# Patient Record
Sex: MALE | Race: BLACK OR AFRICAN AMERICAN | Employment: FULL TIME | URBAN - METROPOLITAN AREA
[De-identification: names, ages, dates, MRNs, and addresses within clinical notes are randomized per-mention and may not be internally consistent; named-entity substitution may affect disease eponyms.]

---

## 2022-01-21 PROBLEM — E66.9 OBESITY (BMI 35.0-39.9 WITHOUT COMORBIDITY): Status: ACTIVE | Noted: 2022-01-21

## 2022-01-24 PROBLEM — E78.5 BORDERLINE HYPERLIPIDEMIA: Status: ACTIVE | Noted: 2022-01-24

## 2022-01-24 PROBLEM — R73.01 ELEVATED FASTING GLUCOSE: Status: ACTIVE | Noted: 2022-01-24

## 2022-03-18 PROBLEM — E66.9 OBESITY (BMI 35.0-39.9 WITHOUT COMORBIDITY): Status: ACTIVE | Noted: 2022-01-21

## 2022-03-19 PROBLEM — R73.01 ELEVATED FASTING GLUCOSE: Status: ACTIVE | Noted: 2022-01-24

## 2022-03-19 PROBLEM — E78.5 BORDERLINE HYPERLIPIDEMIA: Status: ACTIVE | Noted: 2022-01-24

## 2022-04-07 ENCOUNTER — HOSPITAL ENCOUNTER (INPATIENT)
Age: 57
LOS: 3 days | Discharge: HOME | DRG: 638 | End: 2022-04-10
Attending: STUDENT IN AN ORGANIZED HEALTH CARE EDUCATION/TRAINING PROGRAM | Admitting: FAMILY MEDICINE
Payer: COMMERCIAL

## 2022-04-07 DIAGNOSIS — E11.9 NEW ONSET TYPE 2 DIABETES MELLITUS (HCC): Primary | ICD-10-CM

## 2022-04-07 PROBLEM — N17.9 AKI (ACUTE KIDNEY INJURY) (HCC): Status: ACTIVE | Noted: 2022-04-07

## 2022-04-07 PROBLEM — R03.0 ELEVATED BLOOD PRESSURE READING: Status: ACTIVE | Noted: 2022-04-07

## 2022-04-07 LAB
ALBUMIN SERPL-MCNC: 4.1 G/DL (ref 3.5–5)
ALBUMIN/GLOB SERPL: 0.7 (ref 1.2–3.5)
ALP SERPL-CCNC: 99 U/L (ref 50–136)
ALT SERPL-CCNC: 25 U/L (ref 12–65)
ANION GAP SERPL CALC-SCNC: 16 MMOL/L (ref 7–16)
ARTERIAL PATENCY WRIST A: ABNORMAL
AST SERPL-CCNC: 21 U/L (ref 15–37)
BASE DEFICIT BLDV-SCNC: 9.6 MMOL/L
BASOPHILS # BLD: 0.1 K/UL (ref 0–0.2)
BASOPHILS NFR BLD: 0 % (ref 0–2)
BILIRUB SERPL-MCNC: 0.6 MG/DL (ref 0.2–1.1)
BUN SERPL-MCNC: 26 MG/DL (ref 6–23)
CALCIUM SERPL-MCNC: 9.7 MG/DL (ref 8.3–10.4)
CHLORIDE SERPL-SCNC: 102 MMOL/L (ref 98–107)
CO2 SERPL-SCNC: 18 MMOL/L (ref 21–32)
CREAT SERPL-MCNC: 1.7 MG/DL (ref 0.8–1.5)
DIFFERENTIAL METHOD BLD: ABNORMAL
EOSINOPHIL # BLD: 0 K/UL (ref 0–0.8)
EOSINOPHIL NFR BLD: 0 % (ref 0.5–7.8)
ERYTHROCYTE [DISTWIDTH] IN BLOOD BY AUTOMATED COUNT: 14.5 % (ref 11.9–14.6)
EST. AVERAGE GLUCOSE BLD GHB EST-MCNC: 252 MG/DL
GAS FLOW.O2 O2 DELIVERY SYS: ABNORMAL
GLOBULIN SER CALC-MCNC: 5.5 G/DL (ref 2.3–3.5)
GLUCOSE BLD STRIP.AUTO-MCNC: 289 MG/DL (ref 65–100)
GLUCOSE BLD STRIP.AUTO-MCNC: 292 MG/DL (ref 65–100)
GLUCOSE BLD STRIP.AUTO-MCNC: 300 MG/DL (ref 65–100)
GLUCOSE BLD STRIP.AUTO-MCNC: 325 MG/DL (ref 65–100)
GLUCOSE SERPL-MCNC: 375 MG/DL (ref 65–100)
HBA1C MFR BLD: 10.4 % (ref 4.2–6.3)
HCO3 BLDV-SCNC: 16.3 MMOL/L (ref 23–28)
HCT VFR BLD AUTO: 47.3 % (ref 41.1–50.3)
HGB BLD-MCNC: 15 G/DL (ref 13.6–17.2)
IMM GRANULOCYTES # BLD AUTO: 0 K/UL (ref 0–0.5)
IMM GRANULOCYTES NFR BLD AUTO: 0 % (ref 0–5)
LYMPHOCYTES # BLD: 4.1 K/UL (ref 0.5–4.6)
LYMPHOCYTES NFR BLD: 34 % (ref 13–44)
MCH RBC QN AUTO: 25.8 PG (ref 26.1–32.9)
MCHC RBC AUTO-ENTMCNC: 31.7 G/DL (ref 31.4–35)
MCV RBC AUTO: 81.4 FL (ref 79.6–97.8)
MONOCYTES # BLD: 0.7 K/UL (ref 0.1–1.3)
MONOCYTES NFR BLD: 5 % (ref 4–12)
NEUTS SEG # BLD: 7.2 K/UL (ref 1.7–8.2)
NEUTS SEG NFR BLD: 60 % (ref 43–78)
NRBC # BLD: 0 K/UL (ref 0–0.2)
O2/TOTAL GAS SETTING VFR VENT: 21 %
PCO2 BLDV: 35.3 MMHG (ref 41–51)
PH BLDV: 7.27 (ref 7.32–7.42)
PLATELET # BLD AUTO: 279 K/UL (ref 150–450)
PMV BLD AUTO: 11.8 FL (ref 9.4–12.3)
PO2 BLDV: 28 MMHG
POTASSIUM SERPL-SCNC: 5.1 MMOL/L (ref 3.5–5.1)
PROT SERPL-MCNC: 9.6 G/DL (ref 6.3–8.2)
RBC # BLD AUTO: 5.81 M/UL (ref 4.23–5.6)
SAO2 % BLDV: 44 % (ref 65–88)
SERVICE CMNT-IMP: ABNORMAL
SODIUM SERPL-SCNC: 136 MMOL/L (ref 136–145)
SPECIMEN TYPE: ABNORMAL
WBC # BLD AUTO: 12.1 K/UL (ref 4.3–11.1)

## 2022-04-07 PROCEDURE — 80053 COMPREHEN METABOLIC PANEL: CPT

## 2022-04-07 PROCEDURE — 99285 EMERGENCY DEPT VISIT HI MDM: CPT

## 2022-04-07 PROCEDURE — 74011636637 HC RX REV CODE- 636/637: Performed by: FAMILY MEDICINE

## 2022-04-07 PROCEDURE — 85025 COMPLETE CBC W/AUTO DIFF WBC: CPT

## 2022-04-07 PROCEDURE — 82803 BLOOD GASES ANY COMBINATION: CPT

## 2022-04-07 PROCEDURE — 82962 GLUCOSE BLOOD TEST: CPT

## 2022-04-07 PROCEDURE — 83036 HEMOGLOBIN GLYCOSYLATED A1C: CPT

## 2022-04-07 PROCEDURE — 74011000250 HC RX REV CODE- 250: Performed by: STUDENT IN AN ORGANIZED HEALTH CARE EDUCATION/TRAINING PROGRAM

## 2022-04-07 PROCEDURE — 74011250636 HC RX REV CODE- 250/636: Performed by: FAMILY MEDICINE

## 2022-04-07 PROCEDURE — 81003 URINALYSIS AUTO W/O SCOPE: CPT

## 2022-04-07 PROCEDURE — 74011250636 HC RX REV CODE- 250/636: Performed by: PHYSICIAN ASSISTANT

## 2022-04-07 PROCEDURE — 74011250636 HC RX REV CODE- 250/636: Performed by: EMERGENCY MEDICINE

## 2022-04-07 PROCEDURE — 65270000029 HC RM PRIVATE

## 2022-04-07 RX ORDER — ACETAMINOPHEN 325 MG/1
650 TABLET ORAL
Status: DISCONTINUED | OUTPATIENT
Start: 2022-04-07 | End: 2022-04-10 | Stop reason: HOSPADM

## 2022-04-07 RX ORDER — SODIUM CHLORIDE 0.9 % (FLUSH) 0.9 %
5-10 SYRINGE (ML) INJECTION AS NEEDED
Status: DISCONTINUED | OUTPATIENT
Start: 2022-04-07 | End: 2022-04-10 | Stop reason: HOSPADM

## 2022-04-07 RX ORDER — SODIUM CHLORIDE 0.9 % (FLUSH) 0.9 %
5-40 SYRINGE (ML) INJECTION EVERY 8 HOURS
Status: DISCONTINUED | OUTPATIENT
Start: 2022-04-07 | End: 2022-04-10 | Stop reason: HOSPADM

## 2022-04-07 RX ORDER — INSULIN GLARGINE 100 [IU]/ML
7 INJECTION, SOLUTION SUBCUTANEOUS
Status: DISCONTINUED | OUTPATIENT
Start: 2022-04-07 | End: 2022-04-08

## 2022-04-07 RX ORDER — POLYETHYLENE GLYCOL 3350 17 G/17G
17 POWDER, FOR SOLUTION ORAL DAILY PRN
Status: DISCONTINUED | OUTPATIENT
Start: 2022-04-07 | End: 2022-04-10 | Stop reason: HOSPADM

## 2022-04-07 RX ORDER — METFORMIN HYDROCHLORIDE 500 MG/1
500 TABLET ORAL
Status: DISCONTINUED | OUTPATIENT
Start: 2022-04-07 | End: 2022-04-07

## 2022-04-07 RX ORDER — ONDANSETRON 4 MG/1
4 TABLET, ORALLY DISINTEGRATING ORAL
Status: DISCONTINUED | OUTPATIENT
Start: 2022-04-07 | End: 2022-04-10 | Stop reason: HOSPADM

## 2022-04-07 RX ORDER — ONDANSETRON 2 MG/ML
4 INJECTION INTRAMUSCULAR; INTRAVENOUS
Status: DISCONTINUED | OUTPATIENT
Start: 2022-04-07 | End: 2022-04-10 | Stop reason: HOSPADM

## 2022-04-07 RX ORDER — ENOXAPARIN SODIUM 100 MG/ML
40 INJECTION SUBCUTANEOUS EVERY 24 HOURS
Status: DISCONTINUED | OUTPATIENT
Start: 2022-04-07 | End: 2022-04-10 | Stop reason: HOSPADM

## 2022-04-07 RX ORDER — ACETAMINOPHEN 650 MG/1
650 SUPPOSITORY RECTAL
Status: DISCONTINUED | OUTPATIENT
Start: 2022-04-07 | End: 2022-04-10 | Stop reason: HOSPADM

## 2022-04-07 RX ORDER — INSULIN LISPRO 100 [IU]/ML
INJECTION, SOLUTION INTRAVENOUS; SUBCUTANEOUS
Status: DISCONTINUED | OUTPATIENT
Start: 2022-04-07 | End: 2022-04-10 | Stop reason: HOSPADM

## 2022-04-07 RX ORDER — SODIUM CHLORIDE 0.9 % (FLUSH) 0.9 %
5-10 SYRINGE (ML) INJECTION EVERY 8 HOURS
Status: DISCONTINUED | OUTPATIENT
Start: 2022-04-07 | End: 2022-04-10 | Stop reason: HOSPADM

## 2022-04-07 RX ORDER — SODIUM CHLORIDE 0.9 % (FLUSH) 0.9 %
5-40 SYRINGE (ML) INJECTION AS NEEDED
Status: DISCONTINUED | OUTPATIENT
Start: 2022-04-07 | End: 2022-04-10 | Stop reason: HOSPADM

## 2022-04-07 RX ORDER — SODIUM CHLORIDE 9 MG/ML
100 INJECTION, SOLUTION INTRAVENOUS CONTINUOUS
Status: DISCONTINUED | OUTPATIENT
Start: 2022-04-07 | End: 2022-04-09

## 2022-04-07 RX ADMIN — INSULIN LISPRO 6 UNITS: 100 INJECTION, SOLUTION INTRAVENOUS; SUBCUTANEOUS at 22:40

## 2022-04-07 RX ADMIN — SODIUM CHLORIDE 1000 ML: 900 INJECTION, SOLUTION INTRAVENOUS at 15:50

## 2022-04-07 RX ADMIN — ENOXAPARIN SODIUM 40 MG: 40 INJECTION SUBCUTANEOUS at 22:39

## 2022-04-07 RX ADMIN — INSULIN GLARGINE 7 UNITS: 100 INJECTION, SOLUTION SUBCUTANEOUS at 22:40

## 2022-04-07 RX ADMIN — SODIUM CHLORIDE 1000 ML: 900 INJECTION, SOLUTION INTRAVENOUS at 16:59

## 2022-04-07 RX ADMIN — SODIUM CHLORIDE 100 ML/HR: 9 INJECTION, SOLUTION INTRAVENOUS at 22:42

## 2022-04-07 RX ADMIN — SODIUM CHLORIDE, PRESERVATIVE FREE 10 ML: 5 INJECTION INTRAVENOUS at 22:40

## 2022-04-07 RX ADMIN — INSULIN HUMAN 10 UNITS: 100 INJECTION, SOLUTION PARENTERAL at 18:43

## 2022-04-07 NOTE — H&P
Hospitalist Admission History and Physical     NAME:  Garrett Castro   Age:  56 y.o.  :   1965   MRN:   [id number]  PCP: JED Guzman  Consulting MD:  Treatment Team: Attending Provider: Rosmery Ventura DO; Primary Nurse: Elena Tian RN; Physician Assistant: GRACE Parker    No chief complaint on file. HPI:   Patient is a 56 y.o. male who presented to the ED for cc increased urination and fatigue for the past two weeks. Nothing seems to make better or worse. No past medical hx but does have a family hx of DM type II and HTN. Vitals - mild tachycardia, BP elevated    Labs- Glucose 426. WBC 12.1. Createine 1.7 from baseline 1.1. Family History   Problem Relation Age of Onset    Hypertension Mother     Diabetes Mother     Diabetes Father     Hypertension Father     Diabetes Maternal Grandmother     Hypertension Maternal Grandmother     Diabetes Maternal Grandfather     Hypertension Maternal Grandfather     Diabetes Paternal Grandmother     Hypertension Paternal Grandmother     Diabetes Paternal Grandfather     Hypertension Paternal Grandfather      Family history reviewed and negative except as noted above.     Social History     Social History Narrative    Not on file        Social History     Tobacco Use    Smoking status: Never Smoker    Smokeless tobacco: Never Used   Substance Use Topics    Alcohol use: Never        Social History     Substance and Sexual Activity   Drug Use Never         No Known Allergies    Prior to Admission medications    Not on File           Review of Systems    Constitutional:  NAD  Eyes:  no change in visual acuity, no photophobia  Ears, nose, mouth, throat, and face: no  Odynphagia, dysphagia, no thrush or exudate, negative for chronic sinus congestion, recurrent headaches  Respiratory: negative for SOB, hemoptysis or cough  Cardiovascular: negative for CP, palpitations, or PND  Gastrointestinal:inicreased thirst  Genitourinary: increased urinary frequency  Integument/breast: negative for skin rash or skin lesions  Hematologic/lymphatic: negative for known bleeding disorder  Musculoskeletal:negative for joint pain or joint tenderness  Neurological: fatigue  Behavioral/Psych: negative for depression or chronic anxiety,   Endocrine: negative for polydyspia, polyuria or intolerance to heat or cold  Allergic/Immunologic: negative for chronic allergic rhinitis, or known connective tissue disorder      Objective:     Patient Vitals for the past 24 hrs:   Temp Pulse Resp BP SpO2   04/07/22 1534 98.4 °F (36.9 °C) (!) 105 16 (!) 152/98 98 %        04/07 0701 - 04/07 1900  In: 2000 [I.V.:2000]  Out: -   No intake/output data recorded.     Data Review:   Recent Results (from the past 24 hour(s))   AMB POC URINALYSIS DIP STICK AUTO W/ MICRO    Collection Time: 04/07/22 11:31 AM   Result Value Ref Range    Color (UA POC) Yellow     Clarity (UA POC) Clear     Glucose (UA POC) 3+ Negative    Bilirubin (UA POC) 1+ Negative    Ketones (UA POC) 4+ Negative    Specific gravity (UA POC) 1.030 1.001 - 1.035    Blood (UA POC) Trace Negative    pH (UA POC) 5.5 4.6 - 8.0    Protein (UA POC) 1+ Negative    Urobilinogen (UA POC) normal 0.2 - 1    Nitrites (UA POC) Negative Negative    Leukocyte esterase (UA POC) Negative Negative   AMB POC GLUCOSE, SEMI QUANTITATIVE, BLOOD    Collection Time: 04/07/22 11:36 AM   Result Value Ref Range    Glucose  MG/DL   GLUCOSE, POC    Collection Time: 04/07/22  3:46 PM   Result Value Ref Range    Glucose (POC) 325 (H) 65 - 100 mg/dL    Performed by Kaba (Chamber)    CBC WITH AUTOMATED DIFF    Collection Time: 04/07/22  3:47 PM   Result Value Ref Range    WBC 12.1 (H) 4.3 - 11.1 K/uL    RBC 5.81 (H) 4.23 - 5.6 M/uL    HGB 15.0 13.6 - 17.2 g/dL    HCT 47.3 41.1 - 50.3 %    MCV 81.4 79.6 - 97.8 FL    MCH 25.8 (L) 26.1 - 32.9 PG    MCHC 31.7 31.4 - 35.0 g/dL    RDW 14.5 11.9 - 14.6 % PLATELET 509 908 - 669 K/uL    MPV 11.8 9.4 - 12.3 FL    ABSOLUTE NRBC 0.00 0.0 - 0.2 K/uL    DF AUTOMATED      NEUTROPHILS 60 43 - 78 %    LYMPHOCYTES 34 13 - 44 %    MONOCYTES 5 4.0 - 12.0 %    EOSINOPHILS 0 (L) 0.5 - 7.8 %    BASOPHILS 0 0.0 - 2.0 %    IMMATURE GRANULOCYTES 0 0.0 - 5.0 %    ABS. NEUTROPHILS 7.2 1.7 - 8.2 K/UL    ABS. LYMPHOCYTES 4.1 0.5 - 4.6 K/UL    ABS. MONOCYTES 0.7 0.1 - 1.3 K/UL    ABS. EOSINOPHILS 0.0 0.0 - 0.8 K/UL    ABS. BASOPHILS 0.1 0.0 - 0.2 K/UL    ABS. IMM. GRANS. 0.0 0.0 - 0.5 K/UL   METABOLIC PANEL, COMPREHENSIVE    Collection Time: 04/07/22  3:47 PM   Result Value Ref Range    Sodium 136 136 - 145 mmol/L    Potassium 5.1 3.5 - 5.1 mmol/L    Chloride 102 98 - 107 mmol/L    CO2 18 (L) 21 - 32 mmol/L    Anion gap 16 7 - 16 mmol/L    Glucose 375 (H) 65 - 100 mg/dL    BUN 26 (H) 6 - 23 MG/DL    Creatinine 1.70 (H) 0.8 - 1.5 MG/DL    GFR est AA 54 (L) >60 ml/min/1.73m2    GFR est non-AA 45 (L) >60 ml/min/1.73m2    Calcium 9.7 8.3 - 10.4 MG/DL    Bilirubin, total 0.6 0.2 - 1.1 MG/DL    ALT (SGPT) 25 12 - 65 U/L    AST (SGOT) 21 15 - 37 U/L    Alk.  phosphatase 99 50 - 136 U/L    Protein, total 9.6 (H) 6.3 - 8.2 g/dL    Albumin 4.1 3.5 - 5.0 g/dL    Globulin 5.5 (H) 2.3 - 3.5 g/dL    A-G Ratio 0.7 (L) 1.2 - 3.5     POC VENOUS BLOOD GAS    Collection Time: 04/07/22  5:35 PM   Result Value Ref Range    Device: ROOM AIR      FIO2 (POC) 21 %    pH, venous (POC) 7.27 (L) 7.32 - 7.42      pCO2, venous (POC) 35.3 (L) 41 - 51 MMHG    pO2, venous (POC) 28 (LL) mmHg    HCO3, venous (POC) 16.3 (L) 23 - 28 MMOL/L    sO2, venous (POC) 44.0 (L) 65 - 88 %    Base deficit, venous (POC) 9.6 mmol/L    Allens test (POC) NOT APPLICABLE      Specimen type (POC) VENOUS BLOOD      Performed by Krystle     Critical value read back DRSHEORN    GLUCOSE, POC    Collection Time: 04/07/22  6:27 PM   Result Value Ref Range    Glucose (POC) 300 (H) 65 - 100 mg/dL    Performed by Lore Physical Exam:     General:  Alert, cooperative, tired appearing. Eyes:  Conjunctivae/corneas clear. PERRL   Ears:  Normal TMs and external ear canals both ears. Nose: Wearing mask    Mouth/Throat: Wearing mask   Neck:  no JVD. Back:   deferred   Lungs:   Clear to auscultation bilaterally. Heart:  Regular rate and rhythm, S1, S2 normal   Abdomen:   Soft, non-tender. Bowel sounds normal. Umbilical hernia. Obese   Extremities: Extremities normal, atraumatic, no cyanosis or edema. Pulses: 2+ and symmetric all extremities. Skin: Skin color, texture, turgor normal. No rashes or lesions   Lymph nodes:  normal.   Neurologic: CNII-XII intact. Assessment and Plan     Principal Problem:    New onset type 2 diabetes mellitus ([facility] [address] (4/7/2022)    Active Problems:    Obesity (BMI 35.0-39.9 without comorbidity) (1/21/2022)      Elevated blood pressure reading (4/7/2022)      LINDA (acute kidney injury) ([facility] [address] (4/7/2022)    New diagnosis of DM type II - Start lantus 7 units qHS, SS. Give humulin R 10 units now. Glucose starting to trend down. Consult DM education. Check A1C. LINDA - IV fluids. Once kidney function normal, can consider adding metformin and ACE    Elevated BP reading without diagnosis of HTN - Recheck BP reading at home or at PCP office. May need to be on BP medications.      No indication of infection, leukocytosis likely from stress demargination    DVT prophylaxis - Lovenox    Signed By: Mateo Santana DO   April 7, 2022

## 2022-04-07 NOTE — ACP (ADVANCE CARE PLANNING)
[email or web address] Hospitalist Service  At the heart of better care     Advance Care Planning   Admit Date:  2022  4:21 PM   Name:  Devi Pardo   Age:  56 y.o. Sex:  male  :  1965   MRN:  [id number]   Room:  [place] [address]    Devi Pardo is able to make his own decisions: Yes    If pt unable to make decisions, POA/surrogate decision maker:  Wife    Other members present in the meeting:   Wife    Patient / surrogate decision-maker directed:  Code Status: FULL CODE -full aggressive medical and surgical interventions, including intubations, resuscitations, pressors, artificial tube feeding    Patient or surrogate consented to discussion of the current conditions, workup, management plans, prognosis, and understand the risk for further deterioration. Time spent: 17 minutes in direct discussion (face to face and/or over phone).     Signed:  Lena Olivas DO

## 2022-04-07 NOTE — H&P
Hospitalist Admission History and Physical     NAME:  Alexander Glasgow   Age:  56 y.o.  :   1965   MRN:   [id number]  PCP: JED Kay  Consulting MD:  Treatment Team: Attending Provider: Francisco Pardo DO; Primary Nurse: Yenni Morgan, RN; Physician Assistant: GRACE Saravia    No chief complaint on file. HPI:   Patient is a 56 y.o. male who presented to the ED for cc increased urination and fatigue for the past two weeks. Nothing seems to make better or worse. No past medical hx but does have a family hx of DM type II and HTN. Vitals - mild tachycardia, BP elevated    Labs- Glucose 426. WBC 12.1. Createine 1.7 from baseline 1.1. Family History   Problem Relation Age of Onset    Hypertension Mother     Diabetes Mother     Diabetes Father     Hypertension Father     Diabetes Maternal Grandmother     Hypertension Maternal Grandmother     Diabetes Maternal Grandfather     Hypertension Maternal Grandfather     Diabetes Paternal Grandmother     Hypertension Paternal Grandmother     Diabetes Paternal Grandfather     Hypertension Paternal Grandfather      Family history reviewed and negative except as noted above.     Social History     Social History Narrative    Not on file        Social History     Tobacco Use    Smoking status: Never Smoker    Smokeless tobacco: Never Used   Substance Use Topics    Alcohol use: Never        Social History     Substance and Sexual Activity   Drug Use Never         No Known Allergies    Prior to Admission medications    Not on File           Review of Systems    Constitutional:  NAD  Eyes:  no change in visual acuity, no photophobia  Ears, nose, mouth, throat, and face: no  Odynphagia, dysphagia, no thrush or exudate, negative for chronic sinus congestion, recurrent headaches  Respiratory: negative for SOB, hemoptysis or cough  Cardiovascular: negative for CP, palpitations, or PND  Gastrointestinal:inicreased thirst  Genitourinary: increased urinary frequency  Integument/breast: negative for skin rash or skin lesions  Hematologic/lymphatic: negative for known bleeding disorder  Musculoskeletal:negative for joint pain or joint tenderness  Neurological: fatigue  Behavioral/Psych: negative for depression or chronic anxiety,   Endocrine: negative for polydyspia, polyuria or intolerance to heat or cold  Allergic/Immunologic: negative for chronic allergic rhinitis, or known connective tissue disorder      Objective:     Patient Vitals for the past 24 hrs:   Temp Pulse Resp BP SpO2   04/07/22 1534 98.4 °F (36.9 °C) (!) 105 16 (!) 152/98 98 %        04/07 0701 - 04/07 1900  In: 2000 [I.V.:2000]  Out: -   No intake/output data recorded.     Data Review:   Recent Results (from the past 24 hour(s))   AMB POC URINALYSIS DIP STICK AUTO W/ MICRO    Collection Time: 04/07/22 11:31 AM   Result Value Ref Range    Color (UA POC) Yellow     Clarity (UA POC) Clear     Glucose (UA POC) 3+ Negative    Bilirubin (UA POC) 1+ Negative    Ketones (UA POC) 4+ Negative    Specific gravity (UA POC) 1.030 1.001 - 1.035    Blood (UA POC) Trace Negative    pH (UA POC) 5.5 4.6 - 8.0    Protein (UA POC) 1+ Negative    Urobilinogen (UA POC) normal 0.2 - 1    Nitrites (UA POC) Negative Negative    Leukocyte esterase (UA POC) Negative Negative   AMB POC GLUCOSE, SEMI QUANTITATIVE, BLOOD    Collection Time: 04/07/22 11:36 AM   Result Value Ref Range    Glucose  MG/DL   GLUCOSE, POC    Collection Time: 04/07/22  3:46 PM   Result Value Ref Range    Glucose (POC) 325 (H) 65 - 100 mg/dL    Performed by Kaba (Chamber)    CBC WITH AUTOMATED DIFF    Collection Time: 04/07/22  3:47 PM   Result Value Ref Range    WBC 12.1 (H) 4.3 - 11.1 K/uL    RBC 5.81 (H) 4.23 - 5.6 M/uL    HGB 15.0 13.6 - 17.2 g/dL    HCT 47.3 41.1 - 50.3 %    MCV 81.4 79.6 - 97.8 FL    MCH 25.8 (L) 26.1 - 32.9 PG    MCHC 31.7 31.4 - 35.0 g/dL    RDW 14.5 11.9 - 14.6 % PLATELET 075 722 - 938 K/uL    MPV 11.8 9.4 - 12.3 FL    ABSOLUTE NRBC 0.00 0.0 - 0.2 K/uL    DF AUTOMATED      NEUTROPHILS 60 43 - 78 %    LYMPHOCYTES 34 13 - 44 %    MONOCYTES 5 4.0 - 12.0 %    EOSINOPHILS 0 (L) 0.5 - 7.8 %    BASOPHILS 0 0.0 - 2.0 %    IMMATURE GRANULOCYTES 0 0.0 - 5.0 %    ABS. NEUTROPHILS 7.2 1.7 - 8.2 K/UL    ABS. LYMPHOCYTES 4.1 0.5 - 4.6 K/UL    ABS. MONOCYTES 0.7 0.1 - 1.3 K/UL    ABS. EOSINOPHILS 0.0 0.0 - 0.8 K/UL    ABS. BASOPHILS 0.1 0.0 - 0.2 K/UL    ABS. IMM. GRANS. 0.0 0.0 - 0.5 K/UL   METABOLIC PANEL, COMPREHENSIVE    Collection Time: 04/07/22  3:47 PM   Result Value Ref Range    Sodium 136 136 - 145 mmol/L    Potassium 5.1 3.5 - 5.1 mmol/L    Chloride 102 98 - 107 mmol/L    CO2 18 (L) 21 - 32 mmol/L    Anion gap 16 7 - 16 mmol/L    Glucose 375 (H) 65 - 100 mg/dL    BUN 26 (H) 6 - 23 MG/DL    Creatinine 1.70 (H) 0.8 - 1.5 MG/DL    GFR est AA 54 (L) >60 ml/min/1.73m2    GFR est non-AA 45 (L) >60 ml/min/1.73m2    Calcium 9.7 8.3 - 10.4 MG/DL    Bilirubin, total 0.6 0.2 - 1.1 MG/DL    ALT (SGPT) 25 12 - 65 U/L    AST (SGOT) 21 15 - 37 U/L    Alk.  phosphatase 99 50 - 136 U/L    Protein, total 9.6 (H) 6.3 - 8.2 g/dL    Albumin 4.1 3.5 - 5.0 g/dL    Globulin 5.5 (H) 2.3 - 3.5 g/dL    A-G Ratio 0.7 (L) 1.2 - 3.5     POC VENOUS BLOOD GAS    Collection Time: 04/07/22  5:35 PM   Result Value Ref Range    Device: ROOM AIR      FIO2 (POC) 21 %    pH, venous (POC) 7.27 (L) 7.32 - 7.42      pCO2, venous (POC) 35.3 (L) 41 - 51 MMHG    pO2, venous (POC) 28 (LL) mmHg    HCO3, venous (POC) 16.3 (L) 23 - 28 MMOL/L    sO2, venous (POC) 44.0 (L) 65 - 88 %    Base deficit, venous (POC) 9.6 mmol/L    Allens test (POC) NOT APPLICABLE      Specimen type (POC) VENOUS BLOOD      Performed by Krystle     Critical value read back DRSHEORN    GLUCOSE, POC    Collection Time: 04/07/22  6:27 PM   Result Value Ref Range    Glucose (POC) 300 (H) 65 - 100 mg/dL    Performed by Lore Physical Exam:     General:  Alert, cooperative, tired appearing. Eyes:  Conjunctivae/corneas clear. PERRL   Ears:  Normal TMs and external ear canals both ears. Nose: Wearing mask    Mouth/Throat: Wearing mask   Neck:  no JVD. Back:   deferred   Lungs:   Clear to auscultation bilaterally. Heart:  Regular rate and rhythm, S1, S2 normal   Abdomen:   Soft, non-tender. Bowel sounds normal. Umbilical hernia. Obese   Extremities: Extremities normal, atraumatic, no cyanosis or edema. Pulses: 2+ and symmetric all extremities. Skin: Skin color, texture, turgor normal. No rashes or lesions   Lymph nodes:  normal.   Neurologic: CNII-XII intact. Assessment and Plan     Principal Problem:    New onset type 2 diabetes mellitus ([facility] [address] (4/7/2022)    Active Problems:    Obesity (BMI 35.0-39.9 without comorbidity) (1/21/2022)      Elevated blood pressure reading (4/7/2022)      LINDA (acute kidney injury) ([facility] [address] (4/7/2022)    New diagnosis of DM type II - Start lantus 7 units qHS, SS. Give humulin R 10 units now. Glucose starting to trend down. Consult DM education. Check A1C. LINDA - IV fluids. Once kidney function normal, can consider adding metformin and ACE    Elevated BP reading without diagnosis of HTN - Recheck BP reading at home or at PCP office. May need to be on BP medications.      No indication of infection, leukocytosis likely from stress demargination    DVT prophylaxis - Lovenox    Signed By: Fortunato Bourne DO   April 7, 2022

## 2022-04-07 NOTE — ED PROVIDER NOTES
To ER with 2-week history of frequent urination and fatigue. Seen at primary care and sent here for evaluation for hyperglycemia patient has no history of diabetes patient complains of polyuria polydipsia    The history is provided by the patient. Fatigue  This is a new problem. Episode onset: 2 weeks. The problem occurs constantly. The problem has been gradually worsening. Pertinent negatives include no chest pain and no abdominal pain. Nothing aggravates the symptoms. Nothing relieves the symptoms. He has tried nothing for the symptoms. The treatment provided no relief. No past medical history on file. No past surgical history on file.       Family History:   Problem Relation Age of Onset    Hypertension Mother     Diabetes Mother     Diabetes Father     Hypertension Father     Diabetes Maternal Grandmother     Hypertension Maternal Grandmother     Diabetes Maternal Grandfather     Hypertension Maternal Grandfather     Diabetes Paternal Grandmother     Hypertension Paternal Grandmother     Diabetes Paternal Grandfather     Hypertension Paternal Grandfather        Social History     Socioeconomic History    Marital status:      Spouse name: Not on file    Number of children: Not on file    Years of education: Not on file    Highest education level: Not on file   Occupational History    Not on file   Tobacco Use    Smoking status: Never Smoker    Smokeless tobacco: Never Used   Vaping Use    Vaping Use: Never used   Substance and Sexual Activity    Alcohol use: Never    Drug use: Never    Sexual activity: Yes   Other Topics Concern    Not on file   Social History Narrative    Not on file     Social Determinants of Health     Financial Resource Strain:     Difficulty of Paying Living Expenses: Not on file   Food Insecurity:     Worried About [address] in the Last Year: Not on file    Sis of Food in the Last Year: Not on file   Transportation Needs:     Lack of Transportation (Medical): Not on file    Lack of Transportation (Non-Medical): Not on file   Physical Activity:     Days of Exercise per Week: Not on file    Minutes of Exercise per Session: Not on file   Stress:     Feeling of Stress : Not on file   Social Connections:     Frequency of Communication with Friends and Family: Not on file    Frequency of Social Gatherings with Friends and Family: Not on file    Attends Synagogue Services: Not on file    Active Member of [address] [place] [address] or Organizations: Not on file    Attends Club or Organization Meetings: Not on file    Marital Status: Not on file   Intimate Partner Violence:     Fear of Current or Ex-Partner: Not on file    Emotionally Abused: Not on file    Physically Abused: Not on file    Sexually Abused: Not on file   Housing Stability:     Unable to Pay for Housing in the Last Year: Not on file    Number of [place] in the Last Year: Not on file    Unstable Housing in the Last Year: Not on file         ALLERGIES: Patient has no known allergies. Review of Systems   Constitutional: Positive for fatigue. Cardiovascular: Negative for chest pain. Gastrointestinal: Negative for abdominal pain. All other systems reviewed and are negative. Vitals:    04/07/22 1534   BP: (!) 152/98   Pulse: (!) 105   Resp: 16   Temp: 98.4 °F (36.9 °C)   SpO2: 98%   Weight: 117.5 kg (259 lb)   Height: 6' 1\" (1.854 m)            Physical Exam  Vitals and nursing note reviewed. Constitutional:       General: He is not in acute distress. Appearance: Normal appearance. He is well-developed. He is obese. He is not ill-appearing or diaphoretic. HENT:      Head: Normocephalic and atraumatic. Right Ear: External ear normal.      Left Ear: External ear normal.      Nose: Nose normal.      Mouth/Throat:      Mouth: Mucous membranes are moist.   Eyes:      Pupils: Pupils are equal, round, and reactive to light.    Cardiovascular:      Rate and Rhythm: Regular rhythm. Tachycardia present. Pulmonary:      Effort: Pulmonary effort is normal.      Breath sounds: Normal breath sounds. Abdominal:      General: Bowel sounds are normal.      Palpations: Abdomen is soft. Musculoskeletal:         General: Normal range of motion. Cervical back: Normal range of motion and neck supple. Right lower leg: No edema. Left lower leg: No edema. Skin:     General: Skin is warm. Neurological:      General: No focal deficit present. Mental Status: He is alert and oriented to person, place, and time.    Psychiatric:         Mood and Affect: Mood normal.         Behavior: Behavior normal.          MDM  Number of Diagnoses or Management Options  New onset type 2 diabetes mellitus ([facility] [address]  Diagnosis management comments: Labs Reviewed  CBC WITH AUTOMATED DIFF - Abnormal; Notable for the following components:     WBC                           12.1 (*)               RBC                           5.81 (*)               MCH                           25.8 (*)               EOSINOPHILS                   0 (*)               All other components within normal limits  METABOLIC PANEL, COMPREHENSIVE - Abnormal; Notable for the following components:     CO2                           18 (*)                 Glucose                       375 (*)                BUN                           26 (*)                 Creatinine                    1.70 (*)               GFR est AA                    54 (*)                 GFR est non-AA                45 (*)                 Protein, total                9.6 (*)                Globulin                      5.5 (*)                A-G Ratio                     0.7 (*)             All other components within normal limits  GLUCOSE, POC - Abnormal; Notable for the following components:     Glucose (POC)                 325 (*)             All other components within normal limits  POC VENOUS BLOOD GAS - Abnormal; Notable for the following components:     pH, venous (POC)              7.27 (*)               pCO2, venous (POC)            35.3 (*)               pO2, venous (POC)             28 (*)                 HCO3, venous (POC)            16.3 (*)               sO2, venous (POC)             44.0 (*)            All other components within normal limits  VENOUS BLOOD GAS  POC URINE MACROSCOPIC    EKG interpretation normal sinus rhythm at 99 bpm no ectopy no signs of MI no previous for comparison    New onset diabetes, patient seen by Dr. Samantha Angel will admit   Patient given 2 L normal saline in the ER remained stable insulin drip ordered specialist consulted       Amount and/or Complexity of Data Reviewed  Clinical lab tests: ordered and reviewed  Review and summarize past medical records: yes  Discuss the patient with other providers: yes    Risk of Complications, Morbidity, and/or Mortality  Presenting problems: moderate  Diagnostic procedures: moderate  Management options: moderate    Patient Progress  Patient progress: improved         Procedures

## 2022-04-07 NOTE — ED PROVIDER NOTES
To ER with 2-week history of frequent urination and fatigue. Seen at primary care and sent here for evaluation for hyperglycemia patient has no history of diabetes patient complains of polyuria polydipsia    The history is provided by the patient. Fatigue  This is a new problem. Episode onset: 2 weeks. The problem occurs constantly. The problem has been gradually worsening. Pertinent negatives include no chest pain and no abdominal pain. Nothing aggravates the symptoms. Nothing relieves the symptoms. He has tried nothing for the symptoms. The treatment provided no relief. No past medical history on file. No past surgical history on file.       Family History:   Problem Relation Age of Onset    Hypertension Mother     Diabetes Mother     Diabetes Father     Hypertension Father     Diabetes Maternal Grandmother     Hypertension Maternal Grandmother     Diabetes Maternal Grandfather     Hypertension Maternal Grandfather     Diabetes Paternal Grandmother     Hypertension Paternal Grandmother     Diabetes Paternal Grandfather     Hypertension Paternal Grandfather        Social History     Socioeconomic History    Marital status:      Spouse name: Not on file    Number of children: Not on file    Years of education: Not on file    Highest education level: Not on file   Occupational History    Not on file   Tobacco Use    Smoking status: Never Smoker    Smokeless tobacco: Never Used   Vaping Use    Vaping Use: Never used   Substance and Sexual Activity    Alcohol use: Never    Drug use: Never    Sexual activity: Yes   Other Topics Concern    Not on file   Social History Narrative    Not on file     Social Determinants of Health     Financial Resource Strain:     Difficulty of Paying Living Expenses: Not on file   Food Insecurity:     Worried About [address] in the Last Year: Not on file    Sis of Food in the Last Year: Not on file   Transportation Needs:     Lack of Transportation (Medical): Not on file    Lack of Transportation (Non-Medical): Not on file   Physical Activity:     Days of Exercise per Week: Not on file    Minutes of Exercise per Session: Not on file   Stress:     Feeling of Stress : Not on file   Social Connections:     Frequency of Communication with Friends and Family: Not on file    Frequency of Social Gatherings with Friends and Family: Not on file    Attends [employer]oration[employer] Services: Not on file    Active Member of [address] [place] [address] or Organizations: Not on file    Attends Club or Organization Meetings: Not on file    Marital Status: Not on file   Intimate Partner Violence:     Fear of Current or Ex-Partner: Not on file    Emotionally Abused: Not on file    Physically Abused: Not on file    Sexually Abused: Not on file   Housing Stability:     Unable to Pay for Housing in the Last Year: Not on file    Number of [place] in the Last Year: Not on file    Unstable Housing in the Last Year: Not on file         ALLERGIES: Patient has no known allergies. Review of Systems   Constitutional: Positive for fatigue. Cardiovascular: Negative for chest pain. Gastrointestinal: Negative for abdominal pain. All other systems reviewed and are negative. Vitals:    04/07/22 1534   BP: (!) 152/98   Pulse: (!) 105   Resp: 16   Temp: 98.4 °F (36.9 °C)   SpO2: 98%   Weight: 117.5 kg (259 lb)   Height: 6' 1\" (1.854 m)            Physical Exam  Vitals and nursing note reviewed. Constitutional:       General: He is not in acute distress. Appearance: Normal appearance. He is well-developed. He is obese. He is not ill-appearing or diaphoretic. HENT:      Head: Normocephalic and atraumatic. Right Ear: External ear normal.      Left Ear: External ear normal.      Nose: Nose normal.      Mouth/Throat:      Mouth: Mucous membranes are moist.   Eyes:      Pupils: Pupils are equal, round, and reactive to light.    Cardiovascular:      Rate and Rhythm: Regular rhythm. Tachycardia present. Pulmonary:      Effort: Pulmonary effort is normal.      Breath sounds: Normal breath sounds. Abdominal:      General: Bowel sounds are normal.      Palpations: Abdomen is soft. Musculoskeletal:         General: Normal range of motion. Cervical back: Normal range of motion and neck supple. Right lower leg: No edema. Left lower leg: No edema. Skin:     General: Skin is warm. Neurological:      General: No focal deficit present. Mental Status: He is alert and oriented to person, place, and time.    Psychiatric:         Mood and Affect: Mood normal.         Behavior: Behavior normal.          MDM  Number of Diagnoses or Management Options  New onset type 2 diabetes mellitus ([facility] [address]  Diagnosis management comments: Labs Reviewed  CBC WITH AUTOMATED DIFF - Abnormal; Notable for the following components:     WBC                           12.1 (*)               RBC                           5.81 (*)               MCH                           25.8 (*)               EOSINOPHILS                   0 (*)               All other components within normal limits  METABOLIC PANEL, COMPREHENSIVE - Abnormal; Notable for the following components:     CO2                           18 (*)                 Glucose                       375 (*)                BUN                           26 (*)                 Creatinine                    1.70 (*)               GFR est AA                    54 (*)                 GFR est non-AA                45 (*)                 Protein, total                9.6 (*)                Globulin                      5.5 (*)                A-G Ratio                     0.7 (*)             All other components within normal limits  GLUCOSE, POC - Abnormal; Notable for the following components:     Glucose (POC)                 325 (*)             All other components within normal limits  POC VENOUS BLOOD GAS - Abnormal; Notable for the following components:     pH, venous (POC)              7.27 (*)               pCO2, venous (POC)            35.3 (*)               pO2, venous (POC)             28 (*)                 HCO3, venous (POC)            16.3 (*)               sO2, venous (POC)             44.0 (*)            All other components within normal limits  VENOUS BLOOD GAS  POC URINE MACROSCOPIC    EKG interpretation normal sinus rhythm at 99 bpm no ectopy no signs of MI no previous for comparison    New onset diabetes, patient seen by Dr. Je Cox will admit   Patient given 2 L normal saline in the ER remained stable insulin drip ordered specialist consulted       Amount and/or Complexity of Data Reviewed  Clinical lab tests: ordered and reviewed  Review and summarize past medical records: yes  Discuss the patient with other providers: yes    Risk of Complications, Morbidity, and/or Mortality  Presenting problems: moderate  Diagnostic procedures: moderate  Management options: moderate    Patient Progress  Patient progress: improved         Procedures

## 2022-04-07 NOTE — ACP (ADVANCE CARE PLANNING)
[facility] Hospitalist Service  At the heart of better care     Advance Care Planning   Admit Date:  2022  4:21 PM   Name:  Danielle Chamberlain   Age:  56 y.o. Sex:  male  :  1965   MRN:  [id number]   Room:  [place] [address]    Danielle Chamberlain is able to make his own decisions: Yes    If pt unable to make decisions, POA/surrogate decision maker:  Wife    Other members present in the meeting:   Wife    Patient / surrogate decision-maker directed:  Code Status: FULL CODE -full aggressive medical and surgical interventions, including intubations, resuscitations, pressors, artificial tube feeding    Patient or surrogate consented to discussion of the current conditions, workup, management plans, prognosis, and understand the risk for further deterioration. Time spent: 17 minutes in direct discussion (face to face and/or over phone).     Signed:  Abdelrahman Aguilera DO

## 2022-04-08 LAB
ANION GAP SERPL CALC-SCNC: 12 MMOL/L (ref 7–16)
APPEARANCE UR: CLEAR
ATRIAL RATE: 99 BPM
BILIRUB UR QL: NEGATIVE
BUN SERPL-MCNC: 20 MG/DL (ref 6–23)
CALCIUM SERPL-MCNC: 9 MG/DL (ref 8.3–10.4)
CALCULATED P AXIS, ECG09: 77 DEGREES
CALCULATED R AXIS, ECG10: 53 DEGREES
CALCULATED T AXIS, ECG11: 41 DEGREES
CHLORIDE SERPL-SCNC: 109 MMOL/L (ref 98–107)
CO2 SERPL-SCNC: 21 MMOL/L (ref 21–32)
COLOR UR: YELLOW
CREAT SERPL-MCNC: 1.2 MG/DL (ref 0.8–1.5)
DIAGNOSIS, 93000: NORMAL
GLUCOSE BLD STRIP.AUTO-MCNC: 260 MG/DL (ref 65–100)
GLUCOSE BLD STRIP.AUTO-MCNC: 301 MG/DL (ref 65–100)
GLUCOSE BLD STRIP.AUTO-MCNC: 307 MG/DL (ref 65–100)
GLUCOSE BLD STRIP.AUTO-MCNC: 336 MG/DL (ref 65–100)
GLUCOSE SERPL-MCNC: 271 MG/DL (ref 65–100)
GLUCOSE UR QL STRIP.AUTO: 500 MG/DL
KETONES UR-MCNC: >160 MG/DL
LEUKOCYTE ESTERASE UR QL STRIP: NEGATIVE
NITRITE UR QL: NEGATIVE
P-R INTERVAL, ECG05: 150 MS
PH UR: 5.5 (ref 5–9)
POTASSIUM SERPL-SCNC: 4 MMOL/L (ref 3.5–5.1)
PROT UR QL: 30 MG/DL
Q-T INTERVAL, ECG07: 330 MS
QRS DURATION, ECG06: 74 MS
QTC CALCULATION (BEZET), ECG08: 423 MS
RBC # UR STRIP: ABNORMAL
SERVICE CMNT-IMP: ABNORMAL
SODIUM SERPL-SCNC: 142 MMOL/L (ref 136–145)
SP GR UR: >1.03 (ref 1–1.02)
UROBILINOGEN UR QL: 0.2 EU/DL (ref 0.2–1)
VENTRICULAR RATE, ECG03: 99 BPM

## 2022-04-08 PROCEDURE — 82962 GLUCOSE BLOOD TEST: CPT

## 2022-04-08 PROCEDURE — 74011000250 HC RX REV CODE- 250: Performed by: STUDENT IN AN ORGANIZED HEALTH CARE EDUCATION/TRAINING PROGRAM

## 2022-04-08 PROCEDURE — 36415 COLL VENOUS BLD VENIPUNCTURE: CPT

## 2022-04-08 PROCEDURE — 74011250637 HC RX REV CODE- 250/637: Performed by: FAMILY MEDICINE

## 2022-04-08 PROCEDURE — 65270000029 HC RM PRIVATE

## 2022-04-08 PROCEDURE — 74011636637 HC RX REV CODE- 636/637: Performed by: FAMILY MEDICINE

## 2022-04-08 PROCEDURE — 74011000250 HC RX REV CODE- 250: Performed by: FAMILY MEDICINE

## 2022-04-08 PROCEDURE — 74011250636 HC RX REV CODE- 250/636: Performed by: FAMILY MEDICINE

## 2022-04-08 PROCEDURE — 93005 ELECTROCARDIOGRAM TRACING: CPT

## 2022-04-08 PROCEDURE — 80048 BASIC METABOLIC PNL TOTAL CA: CPT

## 2022-04-08 PROCEDURE — 2709999900 HC NON-CHARGEABLE SUPPLY

## 2022-04-08 RX ORDER — LISINOPRIL 5 MG/1
10 TABLET ORAL DAILY
Status: DISCONTINUED | OUTPATIENT
Start: 2022-04-09 | End: 2022-04-10 | Stop reason: HOSPADM

## 2022-04-08 RX ORDER — GLIPIZIDE 5 MG/1
5 TABLET ORAL
Status: DISCONTINUED | OUTPATIENT
Start: 2022-04-08 | End: 2022-04-10 | Stop reason: HOSPADM

## 2022-04-08 RX ORDER — INSULIN GLARGINE 100 [IU]/ML
22 INJECTION, SOLUTION SUBCUTANEOUS
Status: DISCONTINUED | OUTPATIENT
Start: 2022-04-08 | End: 2022-04-08

## 2022-04-08 RX ORDER — METFORMIN HYDROCHLORIDE 500 MG/1
1000 TABLET ORAL 2 TIMES DAILY WITH MEALS
Status: DISCONTINUED | OUTPATIENT
Start: 2022-04-08 | End: 2022-04-10 | Stop reason: HOSPADM

## 2022-04-08 RX ORDER — LISINOPRIL 5 MG/1
5 TABLET ORAL DAILY
Status: DISCONTINUED | OUTPATIENT
Start: 2022-04-08 | End: 2022-04-08

## 2022-04-08 RX ADMIN — SODIUM CHLORIDE, PRESERVATIVE FREE 10 ML: 5 INJECTION INTRAVENOUS at 14:28

## 2022-04-08 RX ADMIN — LISINOPRIL 5 MG: 5 TABLET ORAL at 10:07

## 2022-04-08 RX ADMIN — GLIPIZIDE 5 MG: 5 TABLET ORAL at 16:42

## 2022-04-08 RX ADMIN — INSULIN LISPRO 6 UNITS: 100 INJECTION, SOLUTION INTRAVENOUS; SUBCUTANEOUS at 08:20

## 2022-04-08 RX ADMIN — SODIUM CHLORIDE, PRESERVATIVE FREE 10 ML: 5 INJECTION INTRAVENOUS at 22:50

## 2022-04-08 RX ADMIN — SODIUM CHLORIDE 100 ML/HR: 9 INJECTION, SOLUTION INTRAVENOUS at 22:44

## 2022-04-08 RX ADMIN — METFORMIN HYDROCHLORIDE 1000 MG: 500 TABLET ORAL at 16:42

## 2022-04-08 RX ADMIN — INSULIN LISPRO 8 UNITS: 100 INJECTION, SOLUTION INTRAVENOUS; SUBCUTANEOUS at 16:36

## 2022-04-08 RX ADMIN — INSULIN LISPRO 8 UNITS: 100 INJECTION, SOLUTION INTRAVENOUS; SUBCUTANEOUS at 11:27

## 2022-04-08 RX ADMIN — SODIUM CHLORIDE 100 ML/HR: 9 INJECTION, SOLUTION INTRAVENOUS at 11:40

## 2022-04-08 RX ADMIN — INSULIN LISPRO 8 UNITS: 100 INJECTION, SOLUTION INTRAVENOUS; SUBCUTANEOUS at 22:37

## 2022-04-08 RX ADMIN — ENOXAPARIN SODIUM 40 MG: 40 INJECTION SUBCUTANEOUS at 22:42

## 2022-04-08 NOTE — PROGRESS NOTES
Hourly rounds completed. All needs met. No complaints stated. Gave report to the oncoming day shift nurse.

## 2022-04-08 NOTE — DIABETES MGMT
Patient sitting on side of bed, wife at bedside. Patient denies previous history of diabetes but voices a positive family history of diabetes. Patient states his PCP (Dr. Carlos Eduardo Rey) stated he was borderline in February he was supposed to follow back up but did not. Educated regarding admitting blood glucose and HbA1c. Per provider note patient newly diagnosed with type 2 diabetes. Diabetes educational folder left at bedside for patient and wife to review at this time. Plan to follow up later today for further diabetes education.

## 2022-04-08 NOTE — PROGRESS NOTES
With clean hands,patient participated with giving self insulin injection with no hesitation. He voiced no questtions stating he understood how to perform injection. Shukri Smith

## 2022-04-08 NOTE — PROGRESS NOTES
.. TRANSFER - IN REPORT:    Verbal report received from Naomi Rocha RN on Janee Flores  being received from ED for routine progression of care      Report consisted of patients Situation, Background, Assessment and   Recommendations(SBAR). Information from the following report(s) SBAR, ED Summary, [facility] and Recent Results was reviewed with the receiving nurse. Opportunity for questions and clarification was provided. Assessment completed upon patients arrival to unit and care assumed.

## 2022-04-08 NOTE — PROGRESS NOTES
04/07/22 2113   Family/Physician Communication    Do you want a physician to be notified of this admission?  (Other than treatment team) Yes   Physician Name & Phone Number Bryce Keith, [address] [place] [phone]   Physician Notification Completed No

## 2022-04-08 NOTE — DIABETES MGMT
Patient admitted with new onset type 2 diabetes. Admitting blood glucose 426. Blood glucose ranged 289-426 yesterday with patient receiving Lantus 7 units, Humalog 6 units and Regular 10 units. Blood glucose this morning was 260. Creatinine 1.20. GFR >60. Reviewed patient current regimen: Humalog correctional insulin and Lantus 7 units nightly. Patient would likely benefit from an increase in basal insulin dosing to 0.2 units/kg weight based as fasting blood glucose is not at goal. Provider updated via Teamleader regarding recommendations and patient glycemic control.

## 2022-04-08 NOTE — PROGRESS NOTES
Hospitalist Progress Note   Admit Date:  2022  4:21 PM   Name:  Byron Robin   Age:  56 y.o. Sex:  male  :  1965   MRN:  [id number]   [place]:  [facility] [place]    Presenting Complaint: No chief complaint on file. Reason(s) for Admission: New onset type 2 diabetes mellitus ([facility] [address] [E11.9]  LINDA (acute kidney injury) ([facility] [N17.9]  Elevated blood pressure reading [R03.0]     Hospital Course & Interval History:   Patient is a 56 y.o. male who presented to the ED for cc increased urination and fatigue for the past two weeks. Nothing seems to make better or worse. No past medical hx but does have a family hx of DM type II and HTN.    Vitals - mild tachycardia, BP elevated     Labs- Glucose 426. WBC 12.1. Createine 1.7 from baseline 1.1. Subjective/24hr Events (22): Says feeling good, mild hypoglycemic  Assessment & Plan:     Principal Problem:    New onset type 2 diabetes mellitus ([facility] (2022)  22-increase Lantus to 22, continue sliding scale insulin. Later on today DC'd Lantus, started on metformin 1 g twice daily and glipizide 5 mg twice daily    LINDA  On IV fluids, resolved    Hypertension  Started on lisinopril 5 mg daily        Diet:  ADULT DIET Regular; 4 carb choices (60 gm/meal);  Low Fat/Low Chol/High Fiber/2 gm Na  ADULT ORAL NUTRITION SUPPLEMENT Lunch, Breakfast, Dinner; Standard 4 oz  DVT PPx: Lovenox  Code status: Full Code    Hospital Problems as of 2022 Date Reviewed: 2022          Codes Class Noted - Resolved POA    Elevated blood pressure reading ICD-10-CM: R03.0  ICD-9-CM: 796.2  2022 - Present Unknown        LINDA (acute kidney injury) ([facility] ICD-10-CM: N17.9  ICD-9-CM: 584.9  2022 - Present Unknown        * (Principal) New onset type 2 diabetes mellitus ([facility] ICD-10-CM: E11.9  ICD-9-CM: 250.00  2022 - Present Unknown        Obesity (BMI 35.0-39.9 without comorbidity) ICD-10-CM: E66.9  ICD-9-CM: 278.00  2022 - Present Yes              Objective: Patient Vitals for the past 24 hrs:   Temp Pulse Resp BP SpO2   04/08/22 0742 97.6 °F (36.4 °C) 89 18 137/83 96 %   04/08/22 0352 97.9 °F (36.6 °C) 88 17 129/79 97 %   04/07/22 2240 98.9 °F (37.2 °C) 98 18 (!) 162/103 92 %   04/07/22 2042 98.7 °F (37.1 °C) 98 18 (!) 154/95 97 %   04/07/22 2000 -- -- -- (!) 148/94 97 %   04/07/22 1945 -- -- -- (!) 147/96 97 %   04/07/22 1930 -- -- -- (!) 148/94 97 %   04/07/22 1915 -- -- -- (!) 154/99 98 %   04/07/22 1900 -- -- -- (!) 149/95 97 %   04/07/22 1844 -- -- -- (!) 154/98 97 %   04/07/22 1814 -- -- -- (!) 149/96 99 %   04/07/22 1804 -- -- -- (!) 146/89 99 %   04/07/22 1534 98.4 °F (36.9 °C) (!) 105 16 (!) 152/98 98 %     Oxygen Therapy  O2 Sat (%): 96 % (04/08/22 0742)  Pulse via Oximetry: 92 beats per minute (04/07/22 1844)    Estimated body mass index is 34.17 kg/m² as calculated from the following:    Height as of this encounter: 6' 1\" (1.854 m). Weight as of this encounter: 117.5 kg (259 lb). Intake/Output Summary (Last 24 hours) at 4/8/2022 1011  Last data filed at 4/7/2022 1758  Gross per 24 hour   Intake 2000 ml   Output --   Net 2000 ml         Physical Exam:     Blood pressure 137/83, pulse 89, temperature 97.6 °F (36.4 °C), resp. rate 18, height 6' 1\" (1.854 m), weight 117.5 kg (259 lb), SpO2 96 %. General:    Well nourished. No overt distress  Head:  Normocephalic, atraumatic  Eyes:  Sclerae appear normal.  Pupils equally round. ENT:  Nares appear normal, no drainage. Moist oral mucosa  Neck:  No restricted ROM. Trachea midline   CV:   RRR. No m/r/g. No jugular venous distension. Lungs:   CTAB. No wheezing, rhonchi, or rales. Respirations even, unlabored  Abdomen: Bowel sounds present. Soft, nontender, nondistended. Extremities: No cyanosis or clubbing. No edema  Skin:     No rashes and normal coloration. Warm and dry. Neuro:  CN II-XII grossly intact. Sensation intact. A&Ox3  Psych:  Normal mood and affect.       I have reviewed ordered lab tests and independently visualized imaging below:    Recent Labs:  Recent Results (from the past 48 hour(s))   AMB POC URINALYSIS DIP STICK AUTO W/ MICRO    Collection Time: 04/07/22 11:31 AM   Result Value Ref Range    Color (UA POC) Yellow     Clarity (UA POC) Clear     Glucose (UA POC) 3+ Negative    Bilirubin (UA POC) 1+ Negative    Ketones (UA POC) 4+ Negative    Specific gravity (UA POC) 1.030 1.001 - 1.035    Blood (UA POC) Trace Negative    pH (UA POC) 5.5 4.6 - 8.0    Protein (UA POC) 1+ Negative    Urobilinogen (UA POC) normal 0.2 - 1    Nitrites (UA POC) Negative Negative    Leukocyte esterase (UA POC) Negative Negative   AMB POC GLUCOSE, SEMI QUANTITATIVE, BLOOD    Collection Time: 04/07/22 11:36 AM   Result Value Ref Range    Glucose  MG/DL   GLUCOSE, POC    Collection Time: 04/07/22  3:46 PM   Result Value Ref Range    Glucose (POC) 325 (H) 65 - 100 mg/dL    Performed by Kaba (Chamber)    CBC WITH AUTOMATED DIFF    Collection Time: 04/07/22  3:47 PM   Result Value Ref Range    WBC 12.1 (H) 4.3 - 11.1 K/uL    RBC 5.81 (H) 4.23 - 5.6 M/uL    HGB 15.0 13.6 - 17.2 g/dL    HCT 47.3 41.1 - 50.3 %    MCV 81.4 79.6 - 97.8 FL    MCH 25.8 (L) 26.1 - 32.9 PG    MCHC 31.7 31.4 - 35.0 g/dL    RDW 14.5 11.9 - 14.6 %    PLATELET 012 299 - 155 K/uL    MPV 11.8 9.4 - 12.3 FL    ABSOLUTE NRBC 0.00 0.0 - 0.2 K/uL    DF AUTOMATED      NEUTROPHILS 60 43 - 78 %    LYMPHOCYTES 34 13 - 44 %    MONOCYTES 5 4.0 - 12.0 %    EOSINOPHILS 0 (L) 0.5 - 7.8 %    BASOPHILS 0 0.0 - 2.0 %    IMMATURE GRANULOCYTES 0 0.0 - 5.0 %    ABS. NEUTROPHILS 7.2 1.7 - 8.2 K/UL    ABS. LYMPHOCYTES 4.1 0.5 - 4.6 K/UL    ABS. MONOCYTES 0.7 0.1 - 1.3 K/UL    ABS. EOSINOPHILS 0.0 0.0 - 0.8 K/UL    ABS. BASOPHILS 0.1 0.0 - 0.2 K/UL    ABS. IMM.  GRANS. 0.0 0.0 - 0.5 K/UL   METABOLIC PANEL, COMPREHENSIVE    Collection Time: 04/07/22  3:47 PM   Result Value Ref Range    Sodium 136 136 - 145 mmol/L    Potassium 5.1 3.5 - 5.1 mmol/L    Chloride 102 98 - 107 mmol/L    CO2 18 (L) 21 - 32 mmol/L    Anion gap 16 7 - 16 mmol/L    Glucose 375 (H) 65 - 100 mg/dL    BUN 26 (H) 6 - 23 MG/DL    Creatinine 1.70 (H) 0.8 - 1.5 MG/DL    GFR est AA 54 (L) >60 ml/min/1.73m2    GFR est non-AA 45 (L) >60 ml/min/1.73m2    Calcium 9.7 8.3 - 10.4 MG/DL    Bilirubin, total 0.6 0.2 - 1.1 MG/DL    ALT (SGPT) 25 12 - 65 U/L    AST (SGOT) 21 15 - 37 U/L    Alk.  phosphatase 99 50 - 136 U/L    Protein, total 9.6 (H) 6.3 - 8.2 g/dL    Albumin 4.1 3.5 - 5.0 g/dL    Globulin 5.5 (H) 2.3 - 3.5 g/dL    A-G Ratio 0.7 (L) 1.2 - 3.5     HEMOGLOBIN A1C WITH EAG    Collection Time: 04/07/22  3:47 PM   Result Value Ref Range    Hemoglobin A1c 10.4 (H) 4.20 - 6.30 %    Est. average glucose 252 mg/dL   EKG, 12 LEAD, INITIAL    Collection Time: 04/07/22  3:50 PM   Result Value Ref Range    Ventricular Rate 99 BPM    Atrial Rate 99 BPM    P-R Interval 150 ms    QRS Duration 74 ms    Q-T Interval 330 ms    QTC Calculation (Bezet) 423 ms    Calculated P Axis 77 degrees    Calculated R Axis 53 degrees    Calculated T Axis 41 degrees    Diagnosis       Normal sinus rhythm  Possible Left atrial enlargement  Borderline ECG  No previous ECGs available     POC URINE MACROSCOPIC    Collection Time: 04/07/22  5:05 PM   Result Value Ref Range    Color YELLOW      Appearance CLEAR      Spec. gravity (POC) >1.030 (H) 1.001 - 1.023    pH, urine  (POC) 5.5 5.0 - 9.0      Protein (POC) 30 (A) NEG mg/dL    Glucose, urine (POC) 500 (A) NEG mg/dL    Ketones (POC) >160 (A) NEG mg/dL    Bilirubin (POC) Negative NEG      Blood (POC) Trace Hgb (A) NEG      Urobilinogen (POC) 0.2 0.2 - 1.0 EU/dL    Nitrite (POC) Negative NEG      Leukocyte esterase (POC) Negative NEG     POC VENOUS BLOOD GAS    Collection Time: 04/07/22  5:35 PM   Result Value Ref Range    Device: ROOM AIR      FIO2 (POC) 21 %    pH, venous (POC) 7.27 (L) 7.32 - 7.42      pCO2, venous (POC) 35.3 (L) 41 - 51 MMHG    pO2, venous (POC) 28 (LL) mmHg    HCO3, venous (POC) 16.3 (L) 23 - 28 MMOL/L    sO2, venous (POC) 44.0 (L) 65 - 88 %    Base deficit, venous (POC) 9.6 mmol/L    Allens test (POC) NOT APPLICABLE      Specimen type (POC) VENOUS BLOOD      Performed by Krystle     Critical value read back DRSHEORN    GLUCOSE, POC    Collection Time: 04/07/22  6:27 PM   Result Value Ref Range    Glucose (POC) 300 (H) 65 - 100 mg/dL    Performed by Lore    GLUCOSE, POC    Collection Time: 04/07/22  8:01 PM   Result Value Ref Range    Glucose (POC) 289 (H) 65 - 100 mg/dL    Performed by Ruby    GLUCOSE, POC    Collection Time: 04/07/22  8:38 PM   Result Value Ref Range    Glucose (POC) 292 (H) 65 - 100 mg/dL    Performed by [facility]    METABOLIC PANEL, BASIC    Collection Time: 04/08/22  6:45 AM   Result Value Ref Range    Sodium 142 136 - 145 mmol/L    Potassium 4.0 3.5 - 5.1 mmol/L    Chloride 109 (H) 98 - 107 mmol/L    CO2 21 21 - 32 mmol/L    Anion gap 12 7 - 16 mmol/L    Glucose 271 (H) 65 - 100 mg/dL    BUN 20 6 - 23 MG/DL    Creatinine 1.20 0.8 - 1.5 MG/DL    GFR est AA >60 >60 ml/min/1.73m2    GFR est non-AA >60 >60 ml/min/1.73m2    Calcium 9.0 8.3 - 10.4 MG/DL   GLUCOSE, POC    Collection Time: 04/08/22  7:39 AM   Result Value Ref Range    Glucose (POC) 260 (H) 65 - 100 mg/dL    Performed by Watson        All Micro Results     None          Other Studies:  No results found.     Current Meds:  Current Facility-Administered Medications   Medication Dose Route Frequency    insulin glargine (LANTUS) injection 22 Units  22 Units SubCUTAneous QHS    lisinopriL (PRINIVIL, ZESTRIL) tablet 5 mg  5 mg Oral DAILY    sodium chloride (NS) flush 5-10 mL  5-10 mL IntraVENous Q8H    sodium chloride (NS) flush 5-10 mL  5-10 mL IntraVENous PRN    insulin lispro (HUMALOG) injection   SubCUTAneous AC&HS    0.9% sodium chloride infusion  100 mL/hr IntraVENous CONTINUOUS    sodium chloride (NS) flush 5-40 mL  5-40 mL IntraVENous Q8H    sodium chloride (NS) flush 5-40 mL  5-40 mL IntraVENous PRN    acetaminophen (TYLENOL) tablet 650 mg  650 mg Oral Q6H PRN    Or    acetaminophen (TYLENOL) suppository 650 mg  650 mg Rectal Q6H PRN    polyethylene glycol (MIRALAX) packet 17 g  17 g Oral DAILY PRN    ondansetron (ZOFRAN ODT) tablet 4 mg  4 mg Oral Q8H PRN    Or    ondansetron (ZOFRAN) injection 4 mg  4 mg IntraVENous Q6H PRN    enoxaparin (LOVENOX) injection 40 mg  40 mg SubCUTAneous Q24H       Signed:  Georgie Eric MD

## 2022-04-08 NOTE — ED NOTES
TRANSFER - OUT REPORT:    Verbal report given to [facility] on Derrel Crimes  being transferred to [facility](unit) for routine progression of care       Report consisted of patients Situation, Background, Assessment and   Recommendations(SBAR). Information from the following report(s) SBAR, ED Summary, [facility] and Recent Results was reviewed with the receiving nurse. Lines:   Peripheral IV 04/07/22 Right Antecubital (Active)       Peripheral IV 04/07/22 Left Hand (Active)   Site Assessment Clean, dry, & intact 04/07/22 1551   Phlebitis Assessment 0 04/07/22 1551   Infiltration Assessment 0 04/07/22 1551   Dressing Status Clean, dry, & intact 04/07/22 1551        Opportunity for questions and clarification was provided.       Patient transported with:   [employer]

## 2022-04-08 NOTE — PROGRESS NOTES
CM met with patient at bedside to discuss discharge planning. Patient lying in bed. Demographic information verified by spouse Clay Olivo [phone]. Patient lives with his spouse in a apartment on the 3rd floor. At baseline, the patient is independent with completing ADL's and drives. Patient does not require an assistive device. Patient receives his prescription medications from [facility] in [place] No concerns voiced with obtaining medications in the community. Discharge planning: PT/OT has not been consulted. Patient has no history of [facility] services or REHAB. Patient with anticipated discharge tomorrow 4/9. Diabetes Management consulted. Patient to return home with family at discharged. CM following plan of care. Care Management Interventions  PCP Verified by CM: Yes  Mode of Transport at Discharge: Other (see comment) (Spouse Clay Olivo [phone].)  Transition of Care Consult (CM Consult): Discharge Planning  Discharge Durable Medical Equipment: No  Physical Therapy Consult: No  Occupational Therapy Consult: No  Support Systems: Spouse/Significant Other  Confirm Follow Up Transport: Self  The Plan for Transition of Care is Related to the Following Treatment Goals : Return to Baseline. The Patient and/or Patient Representative was Provided with a Choice of Provider and Agrees with the Discharge Plan?: Yes  Name of the Patient Representative Who was Provided with a Choice of Provider and Agrees with the Discharge Plan: Patient.   [place] Resource Information Provided?: No  Discharge Location  Patient Expects to be Discharged to[de-identified] Home

## 2022-04-08 NOTE — PROGRESS NOTES
04/07/22 2109   Dual Skin Pressure Injury Assessment   Dual Skin Pressure Injury Assessment WDL   Second Care Provider (Based on [address] [place] [address]) Manny Moya RN    Skin Integumentary   Skin Integumentary (WDL) WDL    Pressure  Injury Documentation No Pressure Injury Noted-Pressure Ulcer Prevention Initiated

## 2022-04-08 NOTE — PROGRESS NOTES
Comprehensive Nutrition Assessment    Type and Reason for Visit: Initial,Positive nutrition screen  Best Practice Alert for Malnutrition Screening Tool: Recently Lost Weight Without Trying: Yes, If Yes, How Much Weight Loss: 24 - 33 lbs, Eating Poorly Due to Decreased Appetite: No    Nutrition Recommendations/Plan:   Meals and Snacks:  Continue current diet. Nutrition Supplement Therapy:   Medical food supplement therapy:  Change Glucerna Shake three times per day (this provides 220 kcal and 10 grams protein per bottle)  Supplement if needed with meals. If pt eats well he does not need. Nutrition Education:  Verbally reviewed information with Patient  Educated on basics of consistent cho diet. CHO sources, meal timing, portion sizes. Written educational materials provided. Discharge Nutrition Plan: Outpatient Diabetes Education Counseling recommended to patient- more information at [phone]. Malnutrition Assessment:  Malnutrition Status: At risk for malnutrition (specify) (wt loss associated w uncontrolled glucose)    Nutrition Assessment:   Nutrition History:      Pt reports usual intake one meal daily in the evening. He drinks sweet tea with meals. Generally drink water during the day. Nutrition Background:  Presented for increased urination and fatigue. Admitted with new onset diabetes. Nutrition Interval:  Visit with pt at bedside. CDE following. Pt with appropriate questions about usual food intake, meal patterns at RD visit. Nutrition Related Findings:   No muscle or fat loss noted. Current Nutrition Therapies:  ADULT DIET Regular; 4 carb choices (60 gm/meal);  Low Fat/Low Chol/High Fiber/2 gm Na  ADULT ORAL NUTRITION SUPPLEMENT Lunch, Breakfast, Dinner; Standard 4 oz    Current Intake:   Average Meal Intake: % Average Supplement Intake:  (not using )      Anthropometric Measures:  Height: 6' 1\" (185.4 cm)  Current Body Wt: 117.5 kg (259 lb 0.7 oz), Weight source: Not specified  BMI: 34.2, Obese class 1 (BMI 30.0-34. 9)  Admission Body Weight: 259 lb 0.7 oz (no wt source)  Ideal Body Weight (lbs) (Calculated): 184 lbs (84 kg), 140.8 %  Usual Body Wt: 125.2 kg (276 lb) (January EMR reivew), Percent weight change: -6.1        Edema: No data recorded  Estimated Daily Nutrient Needs:  Energy (kcal/day): 0855-6746 (Kcal/kg (15-20), Weight Used: Admission (117.5 kg))  Protein (g/day):  (20% calories) Weight Used: (Admission)  Fluid (ml/day):   (1 ml/kcal)    Nutrition Diagnosis:   · Food & nutrition-related knowledge deficit related to endocrine dysfunction as evidenced by  (new DM, limited exposure to information)    Nutrition Interventions:   Food and/or Nutrient Delivery: Continue current diet  Nutrition Education/Counseling: Education completed  Coordination of Nutrition Care: Continue to monitor while inpatient,Interdisciplinary rounds    Goals:       Active Goal: Verbalize 3 sources of cho    Nutrition Monitoring and Evaluation:   Behavioral-Environmental Outcomes: Knowledge or skill  Food/Nutrient Intake Outcomes: Food and nutrient intake  Physical Signs/Symptoms Outcomes: Biochemical data,Meal time behavior    Discharge Planning:    Recommend pursue outpatient diabetes education    [place], [place], [place], [place]   Contact: [phone]

## 2022-04-08 NOTE — DIABETES MGMT
Patient admitted with new onset type 2 diabetes. Admitting blood glucose 426. Blood glucose ranged 289-426 yesterday with patient receiving Lantus 7 units, Humalog 6 units and Regular 10 units. Blood glucose this morning was 260. Creatinine 1.20. GFR >60. Reviewed patient current regimen: Humalog correctional insulin and Lantus 7 units nightly. Patient would likely benefit from an increase in basal insulin dosing to 0.2 units/kg weight based as fasting blood glucose is not at goal. Provider updated via [employer] regarding recommendations and patient glycemic control.

## 2022-04-08 NOTE — ED NOTES
TRANSFER - OUT REPORT:    Verbal report given to [facility] on Olga Vogel  being transferred to [facility](unit) for routine progression of care       Report consisted of patients Situation, Background, Assessment and   Recommendations(SBAR). Information from the following report(s) SBAR, ED Summary, [facility] and Recent Results was reviewed with the receiving nurse. Lines:   Peripheral IV 04/07/22 Right Antecubital (Active)       Peripheral IV 04/07/22 Left Hand (Active)   Site Assessment Clean, dry, & intact 04/07/22 1551   Phlebitis Assessment 0 04/07/22 1551   Infiltration Assessment 0 04/07/22 1551   Dressing Status Clean, dry, & intact 04/07/22 1551        Opportunity for questions and clarification was provided.       Patient transported with:   [employer]

## 2022-04-08 NOTE — PROGRESS NOTES
CM met with patient at bedside to discuss discharge planning. Patient lying in bed. Demographic information verified by spouse Rafiq Franco [phone]. Patient lives with his spouse in a apartment on the 3rd floor. At baseline, the patient is independent with completing ADL's and drives. Patient does not require an assistive device. Patient receives his prescription medications from [facility] in [place] No concerns voiced with obtaining medications in the community. Discharge planning: PT/OT has not been consulted. Patient has no history of [facility] services or REHAB. Patient with anticipated discharge tomorrow 4/9. Diabetes Management consulted. Patient to return home with family at discharged. CM following plan of care. Care Management Interventions  PCP Verified by CM: Yes  Mode of Transport at Discharge: Other (see comment) (Spouse Rafiq Franco [phone].)  Transition of Care Consult (CM Consult): Discharge Planning  Discharge Durable Medical Equipment: No  Physical Therapy Consult: No  Occupational Therapy Consult: No  Support Systems: Spouse/Significant Other  Confirm Follow Up Transport: Self  The Plan for Transition of Care is Related to the Following Treatment Goals : Return to Baseline. The Patient and/or Patient Representative was Provided with a Choice of Provider and Agrees with the Discharge Plan?: Yes  Name of the Patient Representative Who was Provided with a Choice of Provider and Agrees with the Discharge Plan: Patient.   [place] Resource Information Provided?: No  Discharge Location  Patient Expects to be Discharged to[de-identified] Home

## 2022-04-08 NOTE — DISCHARGE INSTR - DIET
Discharge Nutrition Plan: Outpatient Diabetes Education Counseling recommended to patient- more information at [phone].

## 2022-04-08 NOTE — DIABETES MGMT
Patient given educational material, \"Diabetes Self-Management: A Patient Teaching Guide\", which was reviewed with patient. Explained basic physiology of diabetes, as well as causes, signs and symptoms, and treatments for hypoglycemia and hyperglycemia. Described the effects of poor glycemic control and the development of long-term complications such as renal, eye, nerve, and cardiovascular disease. Patient denies smoking. Patient denies numbness and tingling in feet. Described proper diabetic foot care and the importance of checking feet daily. Per patient they typically drink sweet tea, artificial sweeteners except for stevia cause headaches, crystal lite. Reviewed effects of sweetened beverages on glycemic control and discussed alternative beverages to help improve glycemic control. Patient voices that they do not drink alcoholic beverages. Per patient they typically eat 1 main meal a day goes until 5pm without eating. Educated re: effects of carbohydrates on blood glucose, the \"plate method\" of healthy meal planning, basics of healthy meal plan, Consistent Carbohydrate Diet, discussed the basics of carb counting and how to read a nutrition label. Educated patient regarding the benefits of physical activity (as cleared by provider) on glycemic control. Also explained the relationship between hyperglycemia and infection and delayed healing. Discussed target goals for blood glucose and A1C. Educated patient regarding diabetic medications including mechanism of action, timing, and possible side effects. Patient verbalizes understanding of teaching. Explained the importance of blood glucose monitoring. Recommended frequency of TID blood glucose checks and to record in log book to take to PCP appointment. Demonstrated how to use a blood glucose meter, care of strips, and sharps disposal. Educated patient that all glucometers are similar but differ in small ways.  Educated patient that they have to get the glucometer covered by their insurance formulary to keep their costs down. Educated patient to seek out  affordable glucometer options that exist at some stores or drug stores if they are ever uninsured. Pt was able to return demonstrate correct use of meter. Pt will need prescription for glucometer and glucometer supplies at discharge so that the patient may obtain the meter covered by their insurance. Patient instructed on the preparation and injection of insulin dose via vial and syringe method. Patient returned demonstrated proper insulin dose preparation and injection technique using injection model, syringe, and vial of saline. Nursing will continue to have patient practice insulin self-injection when insulin dose is due and document progress or refusals of insulin practice in progress notes. Patient educated regarding insulin administration sites. Patient also educated regarding the importance of site rotation, proper storage of insulin, and proper sharps disposal. Patient was also instructed in proper use of insulin pens. Patient was able to return demonstrate proper use of insulin pen using injection model and saline demo pen. 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 Patient also to contact company he works for to determine policy for if he can take insulin. Educated patient regarding current basal/bolus regimen of Lantus and Humalog including type of insulin, timing with meals, onset, duration of effect, and peak of insulin dose. Discussed with provider patient is a  new orders received to start Glipizide 5mg BID and Metformin 1000mg BID. Patient to follow up with PCP within a week as he still may need insulin for glycemic control but is also going to discuss GLP-1 therapy with PCP.  Patient provided educational material from [employer] regarding insulin and CDLs. Patient would likely benefit from continued diabetes outpatient education. Patient provided with contact information to [facility] program to pursue at their convenience after discharge with their primary care provider. Patient did want referral to outpatient program. Referral sent. Encouraged compliance with discharge regimen. Encouraged patient to continue to work on lifestyle modifications and to follow up with PCP for further titration of regimen. Patient and wife verbalized understanding and voice no further questions regarding diabetes management at this time.

## 2022-04-08 NOTE — PROGRESS NOTES
With clean hands,patient participated with giving self insulin injection with no hesitation. He voiced no questtions stating he understood how to perform injection. Olegario Bence

## 2022-04-08 NOTE — PROGRESS NOTES
.. TRANSFER - IN REPORT:    Verbal report received from Stef Tolentino RN on Ana Bullard  being received from ED for routine progression of care      Report consisted of patients Situation, Background, Assessment and   Recommendations(SBAR). Information from the following report(s) SBAR, ED Summary, [facility] and Recent Results was reviewed with the receiving nurse. Opportunity for questions and clarification was provided. Assessment completed upon patients arrival to unit and care assumed.

## 2022-04-08 NOTE — PROGRESS NOTES
04/07/22 2109   Dual Skin Pressure Injury Assessment   Dual Skin Pressure Injury Assessment WDL   Second Care Provider (Based on [address] [place] [address]) Ashley Varghese RN    Skin Integumentary   Skin Integumentary (WDL) WDL    Pressure  Injury Documentation No Pressure Injury Noted-Pressure Ulcer Prevention Initiated

## 2022-04-08 NOTE — PROGRESS NOTES
Hospitalist Progress Note   Admit Date:  2022  4:21 PM   Name:  Alvin Hagen   Age:  56 y.o. Sex:  male  :  1965   MRN:  [id number]   Room:  [facility]    Presenting Complaint: No chief complaint on file. Reason(s) for Admission: New onset type 2 diabetes mellitus ([facility] [E11.9]  LINDA (acute kidney injury) ([facility] [N17.9]  Elevated blood pressure reading [R03.0]     Hospital Course & Interval History:   Patient is a 56 y.o. male who presented to the ED for cc increased urination and fatigue for the past two weeks. Nothing seems to make better or worse. No past medical hx but does have a family hx of DM type II and HTN.    Vitals - mild tachycardia, BP elevated     Labs- Glucose 426. WBC 12.1. Createine 1.7 from baseline 1.1. Subjective/24hr Events (22): Says feeling good, mild hypoglycemic  Assessment & Plan:     Principal Problem:    New onset type 2 diabetes mellitus ([facility] (2022)  22-increase Lantus to 22, continue sliding scale insulin. Later on today DC'd Lantus, started on metformin 1 g twice daily and glipizide 5 mg twice daily    LINDA  On IV fluids, resolved    Hypertension  Started on lisinopril 5 mg daily        Diet:  ADULT DIET Regular; 4 carb choices (60 gm/meal);  Low Fat/Low Chol/High Fiber/2 gm Na  ADULT ORAL NUTRITION SUPPLEMENT Lunch, Breakfast, Dinner; Standard 4 oz  DVT PPx: Lovenox  Code status: Full Code    Hospital Problems as of 2022 Date Reviewed: 2022          Codes Class Noted - Resolved POA    Elevated blood pressure reading ICD-10-CM: R03.0  ICD-9-CM: 796.2  2022 - Present Unknown        LINDA (acute kidney injury) ([facility] ICD-10-CM: N17.9  ICD-9-CM: 584.9  2022 - Present Unknown        * (Principal) New onset type 2 diabetes mellitus ([facility] ICD-10-CM: E11.9  ICD-9-CM: 250.00  2022 - Present Unknown        Obesity (BMI 35.0-39.9 without comorbidity) ICD-10-CM: E66.9  ICD-9-CM: 278.00  2022 - Present Yes              Objective: Patient Vitals for the past 24 hrs:   Temp Pulse Resp BP SpO2   04/08/22 0742 97.6 °F (36.4 °C) 89 18 137/83 96 %   04/08/22 0352 97.9 °F (36.6 °C) 88 17 129/79 97 %   04/07/22 2240 98.9 °F (37.2 °C) 98 18 (!) 162/103 92 %   04/07/22 2042 98.7 °F (37.1 °C) 98 18 (!) 154/95 97 %   04/07/22 2000 -- -- -- (!) 148/94 97 %   04/07/22 1945 -- -- -- (!) 147/96 97 %   04/07/22 1930 -- -- -- (!) 148/94 97 %   04/07/22 1915 -- -- -- (!) 154/99 98 %   04/07/22 1900 -- -- -- (!) 149/95 97 %   04/07/22 1844 -- -- -- (!) 154/98 97 %   04/07/22 1814 -- -- -- (!) 149/96 99 %   04/07/22 1804 -- -- -- (!) 146/89 99 %   04/07/22 1534 98.4 °F (36.9 °C) (!) 105 16 (!) 152/98 98 %     Oxygen Therapy  O2 Sat (%): 96 % (04/08/22 0742)  Pulse via Oximetry: 92 beats per minute (04/07/22 1844)    Estimated body mass index is 34.17 kg/m² as calculated from the following:    Height as of this encounter: 6' 1\" (1.854 m). Weight as of this encounter: 117.5 kg (259 lb). Intake/Output Summary (Last 24 hours) at 4/8/2022 1011  Last data filed at 4/7/2022 1758  Gross per 24 hour   Intake 2000 ml   Output --   Net 2000 ml         Physical Exam:     Blood pressure 137/83, pulse 89, temperature 97.6 °F (36.4 °C), resp. rate 18, height 6' 1\" (1.854 m), weight 117.5 kg (259 lb), SpO2 96 %. General:    Well nourished. No overt distress  Head:  Normocephalic, atraumatic  Eyes:  Sclerae appear normal.  Pupils equally round. ENT:  Nares appear normal, no drainage. Moist oral mucosa  Neck:  No restricted ROM. Trachea midline   CV:   RRR. No m/r/g. No jugular venous distension. Lungs:   CTAB. No wheezing, rhonchi, or rales. Respirations even, unlabored  Abdomen: Bowel sounds present. Soft, nontender, nondistended. Extremities: No cyanosis or clubbing. No edema  Skin:     No rashes and normal coloration. Warm and dry. Neuro:  CN II-XII grossly intact. Sensation intact. A&Ox3  Psych:  Normal mood and affect.       I have reviewed ordered lab tests and independently visualized imaging below:    Recent Labs:  Recent Results (from the past 48 hour(s))   AMB POC URINALYSIS DIP STICK AUTO W/ MICRO    Collection Time: 04/07/22 11:31 AM   Result Value Ref Range    Color (UA POC) Yellow     Clarity (UA POC) Clear     Glucose (UA POC) 3+ Negative    Bilirubin (UA POC) 1+ Negative    Ketones (UA POC) 4+ Negative    Specific gravity (UA POC) 1.030 1.001 - 1.035    Blood (UA POC) Trace Negative    pH (UA POC) 5.5 4.6 - 8.0    Protein (UA POC) 1+ Negative    Urobilinogen (UA POC) normal 0.2 - 1    Nitrites (UA POC) Negative Negative    Leukocyte esterase (UA POC) Negative Negative   AMB POC GLUCOSE, SEMI QUANTITATIVE, BLOOD    Collection Time: 04/07/22 11:36 AM   Result Value Ref Range    Glucose  MG/DL   GLUCOSE, POC    Collection Time: 04/07/22  3:46 PM   Result Value Ref Range    Glucose (POC) 325 (H) 65 - 100 mg/dL    Performed by Kaba (Chamber)    CBC WITH AUTOMATED DIFF    Collection Time: 04/07/22  3:47 PM   Result Value Ref Range    WBC 12.1 (H) 4.3 - 11.1 K/uL    RBC 5.81 (H) 4.23 - 5.6 M/uL    HGB 15.0 13.6 - 17.2 g/dL    HCT 47.3 41.1 - 50.3 %    MCV 81.4 79.6 - 97.8 FL    MCH 25.8 (L) 26.1 - 32.9 PG    MCHC 31.7 31.4 - 35.0 g/dL    RDW 14.5 11.9 - 14.6 %    PLATELET 051 890 - 466 K/uL    MPV 11.8 9.4 - 12.3 FL    ABSOLUTE NRBC 0.00 0.0 - 0.2 K/uL    DF AUTOMATED      NEUTROPHILS 60 43 - 78 %    LYMPHOCYTES 34 13 - 44 %    MONOCYTES 5 4.0 - 12.0 %    EOSINOPHILS 0 (L) 0.5 - 7.8 %    BASOPHILS 0 0.0 - 2.0 %    IMMATURE GRANULOCYTES 0 0.0 - 5.0 %    ABS. NEUTROPHILS 7.2 1.7 - 8.2 K/UL    ABS. LYMPHOCYTES 4.1 0.5 - 4.6 K/UL    ABS. MONOCYTES 0.7 0.1 - 1.3 K/UL    ABS. EOSINOPHILS 0.0 0.0 - 0.8 K/UL    ABS. BASOPHILS 0.1 0.0 - 0.2 K/UL    ABS. IMM.  GRANS. 0.0 0.0 - 0.5 K/UL   METABOLIC PANEL, COMPREHENSIVE    Collection Time: 04/07/22  3:47 PM   Result Value Ref Range    Sodium 136 136 - 145 mmol/L    Potassium 5.1 3.5 - 5.1 mmol/L    Chloride 102 98 - 107 mmol/L    CO2 18 (L) 21 - 32 mmol/L    Anion gap 16 7 - 16 mmol/L    Glucose 375 (H) 65 - 100 mg/dL    BUN 26 (H) 6 - 23 MG/DL    Creatinine 1.70 (H) 0.8 - 1.5 MG/DL    GFR est AA 54 (L) >60 ml/min/1.73m2    GFR est non-AA 45 (L) >60 ml/min/1.73m2    Calcium 9.7 8.3 - 10.4 MG/DL    Bilirubin, total 0.6 0.2 - 1.1 MG/DL    ALT (SGPT) 25 12 - 65 U/L    AST (SGOT) 21 15 - 37 U/L    Alk.  phosphatase 99 50 - 136 U/L    Protein, total 9.6 (H) 6.3 - 8.2 g/dL    Albumin 4.1 3.5 - 5.0 g/dL    Globulin 5.5 (H) 2.3 - 3.5 g/dL    A-G Ratio 0.7 (L) 1.2 - 3.5     HEMOGLOBIN A1C WITH EAG    Collection Time: 04/07/22  3:47 PM   Result Value Ref Range    Hemoglobin A1c 10.4 (H) 4.20 - 6.30 %    Est. average glucose 252 mg/dL   EKG, 12 LEAD, INITIAL    Collection Time: 04/07/22  3:50 PM   Result Value Ref Range    Ventricular Rate 99 BPM    Atrial Rate 99 BPM    P-R Interval 150 ms    QRS Duration 74 ms    Q-T Interval 330 ms    QTC Calculation (Bezet) 423 ms    Calculated P Axis 77 degrees    Calculated R Axis 53 degrees    Calculated T Axis 41 degrees    Diagnosis       Normal sinus rhythm  Possible Left atrial enlargement  Borderline ECG  No previous ECGs available     POC URINE MACROSCOPIC    Collection Time: 04/07/22  5:05 PM   Result Value Ref Range    Color YELLOW      Appearance CLEAR      Spec. gravity (POC) >1.030 (H) 1.001 - 1.023    pH, urine  (POC) 5.5 5.0 - 9.0      Protein (POC) 30 (A) NEG mg/dL    Glucose, urine (POC) 500 (A) NEG mg/dL    Ketones (POC) >160 (A) NEG mg/dL    Bilirubin (POC) Negative NEG      Blood (POC) Trace Hgb (A) NEG      Urobilinogen (POC) 0.2 0.2 - 1.0 EU/dL    Nitrite (POC) Negative NEG      Leukocyte esterase (POC) Negative NEG     POC VENOUS BLOOD GAS    Collection Time: 04/07/22  5:35 PM   Result Value Ref Range    Device: ROOM AIR      FIO2 (POC) 21 %    pH, venous (POC) 7.27 (L) 7.32 - 7.42      pCO2, venous (POC) 35.3 (L) 41 - 51 MMHG    pO2, venous (POC) 28 (LL) mmHg    HCO3, venous (POC) 16.3 (L) 23 - 28 MMOL/L    sO2, venous (POC) 44.0 (L) 65 - 88 %    Base deficit, venous (POC) 9.6 mmol/L    Allens test (POC) NOT APPLICABLE      Specimen type (POC) VENOUS BLOOD      Performed by Krystle     Critical value read back DRSHEORN    GLUCOSE, POC    Collection Time: 04/07/22  6:27 PM   Result Value Ref Range    Glucose (POC) 300 (H) 65 - 100 mg/dL    Performed by Lore    GLUCOSE, POC    Collection Time: 04/07/22  8:01 PM   Result Value Ref Range    Glucose (POC) 289 (H) 65 - 100 mg/dL    Performed by Ruby    GLUCOSE, POC    Collection Time: 04/07/22  8:38 PM   Result Value Ref Range    Glucose (POC) 292 (H) 65 - 100 mg/dL    Performed by [facility]    METABOLIC PANEL, BASIC    Collection Time: 04/08/22  6:45 AM   Result Value Ref Range    Sodium 142 136 - 145 mmol/L    Potassium 4.0 3.5 - 5.1 mmol/L    Chloride 109 (H) 98 - 107 mmol/L    CO2 21 21 - 32 mmol/L    Anion gap 12 7 - 16 mmol/L    Glucose 271 (H) 65 - 100 mg/dL    BUN 20 6 - 23 MG/DL    Creatinine 1.20 0.8 - 1.5 MG/DL    GFR est AA >60 >60 ml/min/1.73m2    GFR est non-AA >60 >60 ml/min/1.73m2    Calcium 9.0 8.3 - 10.4 MG/DL   GLUCOSE, POC    Collection Time: 04/08/22  7:39 AM   Result Value Ref Range    Glucose (POC) 260 (H) 65 - 100 mg/dL    Performed by Watson        All Micro Results     None          Other Studies:  No results found.     Current Meds:  Current Facility-Administered Medications   Medication Dose Route Frequency    insulin glargine (LANTUS) injection 22 Units  22 Units SubCUTAneous QHS    lisinopriL (PRINIVIL, ZESTRIL) tablet 5 mg  5 mg Oral DAILY    sodium chloride (NS) flush 5-10 mL  5-10 mL IntraVENous Q8H    sodium chloride (NS) flush 5-10 mL  5-10 mL IntraVENous PRN    insulin lispro (HUMALOG) injection   SubCUTAneous AC&HS    0.9% sodium chloride infusion  100 mL/hr IntraVENous CONTINUOUS    sodium chloride (NS) flush 5-40 mL  5-40 mL IntraVENous Q8H    sodium chloride (NS) flush 5-40 mL  5-40 mL IntraVENous PRN    acetaminophen (TYLENOL) tablet 650 mg  650 mg Oral Q6H PRN    Or    acetaminophen (TYLENOL) suppository 650 mg  650 mg Rectal Q6H PRN    polyethylene glycol (MIRALAX) packet 17 g  17 g Oral DAILY PRN    ondansetron (ZOFRAN ODT) tablet 4 mg  4 mg Oral Q8H PRN    Or    ondansetron (ZOFRAN) injection 4 mg  4 mg IntraVENous Q6H PRN    enoxaparin (LOVENOX) injection 40 mg  40 mg SubCUTAneous Q24H       Signed:  Geronimo Webb MD

## 2022-04-08 NOTE — PROGRESS NOTES
Comprehensive Nutrition Assessment    Type and Reason for Visit: Initial,Positive nutrition screen  Best Practice Alert for Malnutrition Screening Tool: Recently Lost Weight Without Trying: Yes, If Yes, How Much Weight Loss: 24 - 33 lbs, Eating Poorly Due to Decreased Appetite: No    Nutrition Recommendations/Plan:   Meals and Snacks:  Continue current diet. Nutrition Supplement Therapy:   Medical food supplement therapy:  Change Glucerna Shake three times per day (this provides 220 kcal and 10 grams protein per bottle)  Supplement if needed with meals. If pt eats well he does not need. Nutrition Education:  Verbally reviewed information with Patient  Educated on basics of consistent cho diet. CHO sources, meal timing, portion sizes. Written educational materials provided. Discharge Nutrition Plan: Outpatient Diabetes Education Counseling recommended to patient- more information at [phone]. Malnutrition Assessment:  Malnutrition Status: At risk for malnutrition (specify) (wt loss associated w uncontrolled glucose)    Nutrition Assessment:   Nutrition History:      Pt reports usual intake one meal daily in the evening. He drinks sweet tea with meals. Generally drink water during the day. Nutrition Background:  Presented for increased urination and fatigue. Admitted with new onset diabetes. Nutrition Interval:  Visit with pt at bedside. CDE following. Pt with appropriate questions about usual food intake, meal patterns at RD visit. Nutrition Related Findings:   No muscle or fat loss noted. Current Nutrition Therapies:  ADULT DIET Regular; 4 carb choices (60 gm/meal);  Low Fat/Low Chol/High Fiber/2 gm Na  ADULT ORAL NUTRITION SUPPLEMENT Lunch, Breakfast, Dinner; Standard 4 oz    Current Intake:   Average Meal Intake: % Average Supplement Intake:  (not using )      Anthropometric Measures:  Height: 6' 1\" (185.4 cm)  Current Body Wt: 117.5 kg (259 lb 0.7 oz), Weight source: Not specified  BMI: 34.2, Obese class 1 (BMI 30.0-34. 9)  Admission Body Weight: 259 lb 0.7 oz (no wt source)  Ideal Body Weight (lbs) (Calculated): 184 lbs (84 kg), 140.8 %  Usual Body Wt: 125.2 kg (276 lb) (January EMR reivew), Percent weight change: -6.1        Edema: No data recorded  Estimated Daily Nutrient Needs:  Energy (kcal/day): 2070-9639 (Kcal/kg (15-20), Weight Used: Admission (117.5 kg))  Protein (g/day):  (20% calories) Weight Used: (Admission)  Fluid (ml/day):   (1 ml/kcal)    Nutrition Diagnosis:   · Food & nutrition-related knowledge deficit related to endocrine dysfunction as evidenced by  (new DM, limited exposure to information)    Nutrition Interventions:   Food and/or Nutrient Delivery: Continue current diet  Nutrition Education/Counseling: Education completed  Coordination of Nutrition Care: Continue to monitor while inpatient,Interdisciplinary rounds    Goals:       Active Goal: Verbalize 3 sources of cho    Nutrition Monitoring and Evaluation:   Behavioral-Environmental Outcomes: Knowledge or skill  Food/Nutrient Intake Outcomes: Food and nutrient intake  Physical Signs/Symptoms Outcomes: Biochemical data,Meal time behavior    Discharge Planning:    Recommend pursue outpatient diabetes education    [place], [place], [place], [place]   Contact: [phone]

## 2022-04-08 NOTE — DIABETES MGMT
Patient sitting on side of bed, wife at bedside. Patient denies previous history of diabetes but voices a positive family history of diabetes. Patient states his PCP (Dr. Evelina Pena) stated he was borderline in February he was supposed to follow back up but did not. Educated regarding admitting blood glucose and HbA1c. Per provider note patient newly diagnosed with type 2 diabetes. Diabetes educational folder left at bedside for patient and wife to review at this time. Plan to follow up later today for further diabetes education.

## 2022-04-08 NOTE — DISCHARGE INSTR - DIET
Discharge Nutrition Plan: Outpatient Diabetes Education Counseling recommended to patient- more information at [phone].

## 2022-04-08 NOTE — PROGRESS NOTES
04/07/22 2113   Family/Physician Communication    Do you want a physician to be notified of this admission?  (Other than treatment team) Yes   Physician Name & Phone Number Alistair Mar, [address] [phone]   Physician Notification Completed No

## 2022-04-09 LAB
ANION GAP SERPL CALC-SCNC: 9 MMOL/L (ref 7–16)
BUN SERPL-MCNC: 19 MG/DL (ref 6–23)
CALCIUM SERPL-MCNC: 9.1 MG/DL (ref 8.3–10.4)
CHLORIDE SERPL-SCNC: 109 MMOL/L (ref 98–107)
CO2 SERPL-SCNC: 23 MMOL/L (ref 21–32)
CREAT SERPL-MCNC: 1.1 MG/DL (ref 0.8–1.5)
GLUCOSE BLD STRIP.AUTO-MCNC: 212 MG/DL (ref 65–100)
GLUCOSE BLD STRIP.AUTO-MCNC: 229 MG/DL (ref 65–100)
GLUCOSE BLD STRIP.AUTO-MCNC: 243 MG/DL (ref 65–100)
GLUCOSE BLD STRIP.AUTO-MCNC: 290 MG/DL (ref 65–100)
GLUCOSE SERPL-MCNC: 212 MG/DL (ref 65–100)
POTASSIUM SERPL-SCNC: 3.6 MMOL/L (ref 3.5–5.1)
SERVICE CMNT-IMP: ABNORMAL
SODIUM SERPL-SCNC: 141 MMOL/L (ref 138–145)

## 2022-04-09 PROCEDURE — 82962 GLUCOSE BLOOD TEST: CPT

## 2022-04-09 PROCEDURE — 65270000029 HC RM PRIVATE

## 2022-04-09 PROCEDURE — 74011250637 HC RX REV CODE- 250/637: Performed by: FAMILY MEDICINE

## 2022-04-09 PROCEDURE — 74011000250 HC RX REV CODE- 250: Performed by: FAMILY MEDICINE

## 2022-04-09 PROCEDURE — 80048 BASIC METABOLIC PNL TOTAL CA: CPT

## 2022-04-09 PROCEDURE — 74011636637 HC RX REV CODE- 636/637: Performed by: FAMILY MEDICINE

## 2022-04-09 PROCEDURE — 2709999900 HC NON-CHARGEABLE SUPPLY

## 2022-04-09 PROCEDURE — 74011250636 HC RX REV CODE- 250/636: Performed by: FAMILY MEDICINE

## 2022-04-09 PROCEDURE — 74011000250 HC RX REV CODE- 250: Performed by: STUDENT IN AN ORGANIZED HEALTH CARE EDUCATION/TRAINING PROGRAM

## 2022-04-09 PROCEDURE — 36415 COLL VENOUS BLD VENIPUNCTURE: CPT

## 2022-04-09 RX ORDER — POTASSIUM CHLORIDE 20 MEQ/1
40 TABLET, EXTENDED RELEASE ORAL ONCE
Status: COMPLETED | OUTPATIENT
Start: 2022-04-09 | End: 2022-04-09

## 2022-04-09 RX ADMIN — INSULIN LISPRO 4 UNITS: 100 INJECTION, SOLUTION INTRAVENOUS; SUBCUTANEOUS at 16:01

## 2022-04-09 RX ADMIN — METFORMIN HYDROCHLORIDE 1000 MG: 500 TABLET ORAL at 09:11

## 2022-04-09 RX ADMIN — LISINOPRIL 10 MG: 5 TABLET ORAL at 09:13

## 2022-04-09 RX ADMIN — SODIUM CHLORIDE, PRESERVATIVE FREE 10 ML: 5 INJECTION INTRAVENOUS at 16:01

## 2022-04-09 RX ADMIN — ENOXAPARIN SODIUM 40 MG: 40 INJECTION SUBCUTANEOUS at 22:00

## 2022-04-09 RX ADMIN — SODIUM CHLORIDE, PRESERVATIVE FREE 10 ML: 5 INJECTION INTRAVENOUS at 06:24

## 2022-04-09 RX ADMIN — SODIUM CHLORIDE, PRESERVATIVE FREE 10 ML: 5 INJECTION INTRAVENOUS at 16:00

## 2022-04-09 RX ADMIN — GLIPIZIDE 5 MG: 5 TABLET ORAL at 09:14

## 2022-04-09 RX ADMIN — INSULIN LISPRO 4 UNITS: 100 INJECTION, SOLUTION INTRAVENOUS; SUBCUTANEOUS at 09:14

## 2022-04-09 RX ADMIN — GLIPIZIDE 5 MG: 5 TABLET ORAL at 16:14

## 2022-04-09 RX ADMIN — INSULIN LISPRO 6 UNITS: 100 INJECTION, SOLUTION INTRAVENOUS; SUBCUTANEOUS at 12:40

## 2022-04-09 RX ADMIN — POTASSIUM CHLORIDE 40 MEQ: 20 TABLET, EXTENDED RELEASE ORAL at 09:14

## 2022-04-09 RX ADMIN — METFORMIN HYDROCHLORIDE 1000 MG: 500 TABLET ORAL at 16:13

## 2022-04-09 RX ADMIN — INSULIN LISPRO 4 UNITS: 100 INJECTION, SOLUTION INTRAVENOUS; SUBCUTANEOUS at 21:55

## 2022-04-09 NOTE — PROGRESS NOTES
Hourly round completed. Pt kenyetta up and self administered all insulin dosage. He demonstrates understanding. Pt educated on Lisinopril SE and what to report. Some diabetic education perform. Pt has ambulated halls multiple times, no immediate issues noted.

## 2022-04-09 NOTE — PROGRESS NOTES
Hospitalist Progress Note   Admit Date:  2022  4:21 PM   Name:  Jasmyn Mooney   Age:  56 y.o. Sex:  male  :  1965   MRN:  [id number]   Room:  [facility]    Presenting Complaint: No chief complaint on file. Reason(s) for Admission: New onset type 2 diabetes mellitus ([facility] [E11.9]  LINDA (acute kidney injury) ([facility] [N17.9]  Elevated blood pressure reading [R03.0]     Hospital Course & Interval History:   Patient is a 56 y.o. male who presented to the ED for cc increased urination and fatigue for the past two weeks. Nothing seems to make better or worse. No past medical hx but does have a family hx of DM type II and HTN.    Vitals - mild tachycardia, BP elevated     Labs- Glucose 426. WBC 12.1. Createine 1.7 from baseline 1.1. Subjective/24hr Events (22):  22  Says feeling good, mild hyperglycemic    22  Mild hyperglycemia  Started on Metformin and glipizide yesterday  On sliding scale insulin  Assessment & Plan:     Principal Problem:    New onset type 2 diabetes mellitus ([facility] (2022)  22-increase Lantus to 22, continue sliding scale insulin. Later on today DC'd Lantus, started on metformin 1 g twice daily and glipizide 5 mg twice daily  2022-on Metformin and glipizide and sliding scale    LINDA  On IV fluids, resolved  2022-resolved, DC IV fluids    Hypertension  Started on lisinopril 5 mg daily  22-increase lisinopril to 10 mg daily        Diet:  ADULT DIET Regular; 4 carb choices (60 gm/meal);  Low Fat/Low Chol/High Fiber/2 gm Na  DVT PPx: Lovenox  Code status: Full Code    Hospital Problems as of 2022 Date Reviewed: 2022          Codes Class Noted - Resolved POA    Elevated blood pressure reading ICD-10-CM: R03.0  ICD-9-CM: 796.2  2022 - Present Unknown        LINDA (acute kidney injury) ([facility] ICD-10-CM: N17.9  ICD-9-CM: 584.9  2022 - Present Unknown        * (Principal) New onset type 2 diabetes mellitus ([facility] ICD-10-CM: E11.9  ICD-9-CM: 250.00 4/7/2022 - Present Unknown        Obesity (BMI 35.0-39.9 without comorbidity) ICD-10-CM: E66.9  ICD-9-CM: 278.00  1/21/2022 - Present Yes              Objective:     Patient Vitals for the past 24 hrs:   Temp Pulse Resp BP SpO2   04/09/22 1051 97.7 °F (36.5 °C) 100 20 (!) 143/91 97 %   04/09/22 0710 97.5 °F (36.4 °C) 86 16 126/75 96 %   04/09/22 0419 98.2 °F (36.8 °C) 85 18 126/83 99 %   04/08/22 2256 99 °F (37.2 °C) 97 17 (!) 148/86 97 %   04/08/22 1910 98.9 °F (37.2 °C) 87 16 (!) 119/47 96 %     Oxygen Therapy  O2 Sat (%): 97 % (04/09/22 1051)  Pulse via Oximetry: 92 beats per minute (04/07/22 1844)    Estimated body mass index is 34.17 kg/m² as calculated from the following:    Height as of this encounter: 6' 1\" (1.854 m). Weight as of this encounter: 117.5 kg (259 lb). No intake or output data in the 24 hours ending 04/09/22 1248      Physical Exam:     Blood pressure (!) 143/91, pulse 100, temperature 97.7 °F (36.5 °C), resp. rate 20, height 6' 1\" (1.854 m), weight 117.5 kg (259 lb), SpO2 97 %. General:    Well nourished. No overt distress  Head:  Normocephalic, atraumatic  Eyes:  Sclerae appear normal.  Pupils equally round. ENT:  Nares appear normal, no drainage. Moist oral mucosa  Neck:  No restricted ROM. Trachea midline   CV:   RRR. No m/r/g. No jugular venous distension. Lungs:   CTAB. No wheezing, rhonchi, or rales. Respirations even, unlabored  Abdomen: Bowel sounds present. Soft, nontender, nondistended. Extremities: No cyanosis or clubbing. No edema  Skin:     No rashes and normal coloration. Warm and dry. Neuro:  CN II-XII grossly intact. Sensation intact. A&Ox3  Psych:  Normal mood and affect.       I have reviewed ordered lab tests and independently visualized imaging below:    Recent Labs:  Recent Results (from the past 48 hour(s))   GLUCOSE, POC    Collection Time: 04/07/22  3:46 PM   Result Value Ref Range    Glucose (POC) 325 (H) 65 - 100 mg/dL    Performed by Yeh(Chamber)[email or web address]    CBC WITH AUTOMATED DIFF    Collection Time: 04/07/22  3:47 PM   Result Value Ref Range    WBC 12.1 (H) 4.3 - 11.1 K/uL    RBC 5.81 (H) 4.23 - 5.6 M/uL    HGB 15.0 13.6 - 17.2 g/dL    HCT 47.3 41.1 - 50.3 %    MCV 81.4 79.6 - 97.8 FL    MCH 25.8 (L) 26.1 - 32.9 PG    MCHC 31.7 31.4 - 35.0 g/dL    RDW 14.5 11.9 - 14.6 %    PLATELET 661 522 - 861 K/uL    MPV 11.8 9.4 - 12.3 FL    ABSOLUTE NRBC 0.00 0.0 - 0.2 K/uL    DF AUTOMATED      NEUTROPHILS 60 43 - 78 %    LYMPHOCYTES 34 13 - 44 %    MONOCYTES 5 4.0 - 12.0 %    EOSINOPHILS 0 (L) 0.5 - 7.8 %    BASOPHILS 0 0.0 - 2.0 %    IMMATURE GRANULOCYTES 0 0.0 - 5.0 %    ABS. NEUTROPHILS 7.2 1.7 - 8.2 K/UL    ABS. LYMPHOCYTES 4.1 0.5 - 4.6 K/UL    ABS. MONOCYTES 0.7 0.1 - 1.3 K/UL    ABS. EOSINOPHILS 0.0 0.0 - 0.8 K/UL    ABS. BASOPHILS 0.1 0.0 - 0.2 K/UL    ABS. IMM. GRANS. 0.0 0.0 - 0.5 K/UL   METABOLIC PANEL, COMPREHENSIVE    Collection Time: 04/07/22  3:47 PM   Result Value Ref Range    Sodium 136 136 - 145 mmol/L    Potassium 5.1 3.5 - 5.1 mmol/L    Chloride 102 98 - 107 mmol/L    CO2 18 (L) 21 - 32 mmol/L    Anion gap 16 7 - 16 mmol/L    Glucose 375 (H) 65 - 100 mg/dL    BUN 26 (H) 6 - 23 MG/DL    Creatinine 1.70 (H) 0.8 - 1.5 MG/DL    GFR est AA 54 (L) >60 ml/min/1.73m2    GFR est non-AA 45 (L) >60 ml/min/1.73m2    Calcium 9.7 8.3 - 10.4 MG/DL    Bilirubin, total 0.6 0.2 - 1.1 MG/DL    ALT (SGPT) 25 12 - 65 U/L    AST (SGOT) 21 15 - 37 U/L    Alk.  phosphatase 99 50 - 136 U/L    Protein, total 9.6 (H) 6.3 - 8.2 g/dL    Albumin 4.1 3.5 - 5.0 g/dL    Globulin 5.5 (H) 2.3 - 3.5 g/dL    A-G Ratio 0.7 (L) 1.2 - 3.5     HEMOGLOBIN A1C WITH EAG    Collection Time: 04/07/22  3:47 PM   Result Value Ref Range    Hemoglobin A1c 10.4 (H) 4.20 - 6.30 %    Est. average glucose 252 mg/dL   EKG, 12 LEAD, INITIAL    Collection Time: 04/07/22  3:50 PM   Result Value Ref Range    Ventricular Rate 99 BPM    Atrial Rate 99 BPM    P-R Interval 150 ms    QRS Duration 74 ms    Q-T Interval 330 ms    QTC Calculation (Bezet) 423 ms    Calculated P Axis 77 degrees    Calculated R Axis 53 degrees    Calculated T Axis 41 degrees    Diagnosis       Normal sinus rhythm  Left atrial enlargement  Borderline ECG  No previous ECGs available  Confirmed by Seferino Sears MD (), OTIS REYNOLDS ([id number]) on 4/8/2022 2:04:23 PM     POC URINE MACROSCOPIC    Collection Time: 04/07/22  5:05 PM   Result Value Ref Range    Color YELLOW      Appearance CLEAR      Spec. gravity (POC) >1.030 (H) 1.001 - 1.023    pH, urine  (POC) 5.5 5.0 - 9.0      Protein (POC) 30 (A) NEG mg/dL    Glucose, urine (POC) 500 (A) NEG mg/dL    Ketones (POC) >160 (A) NEG mg/dL    Bilirubin (POC) Negative NEG      Blood (POC) Trace Hgb (A) NEG      Urobilinogen (POC) 0.2 0.2 - 1.0 EU/dL    Nitrite (POC) Negative NEG      Leukocyte esterase (POC) Negative NEG     POC VENOUS BLOOD GAS    Collection Time: 04/07/22  5:35 PM   Result Value Ref Range    Device: ROOM AIR      FIO2 (POC) 21 %    pH, venous (POC) 7.27 (L) 7.32 - 7.42      pCO2, venous (POC) 35.3 (L) 41 - 51 MMHG    pO2, venous (POC) 28 (LL) mmHg    HCO3, venous (POC) 16.3 (L) 23 - 28 MMOL/L    sO2, venous (POC) 44.0 (L) 65 - 88 %    Base deficit, venous (POC) 9.6 mmol/L    Allens test (POC) NOT APPLICABLE      Specimen type (POC) VENOUS BLOOD      Performed by Krystle     Critical value read back DRSHEORN    GLUCOSE, POC    Collection Time: 04/07/22  6:27 PM   Result Value Ref Range    Glucose (POC) 300 (H) 65 - 100 mg/dL    Performed by Lore    GLUCOSE, POC    Collection Time: 04/07/22  8:01 PM   Result Value Ref Range    Glucose (POC) 289 (H) 65 - 100 mg/dL    Performed by Ruby    GLUCOSE, POC    Collection Time: 04/07/22  8:38 PM   Result Value Ref Range    Glucose (POC) 292 (H) 65 - 100 mg/dL    Performed by Ana María    METABOLIC PANEL, BASIC    Collection Time: 04/08/22  6:45 AM   Result Value Ref Range    Sodium 142 136 - 145 mmol/L    Potassium 4.0 3.5 - 5.1 mmol/L    Chloride 109 (H) 98 - 107 mmol/L    CO2 21 21 - 32 mmol/L    Anion gap 12 7 - 16 mmol/L    Glucose 271 (H) 65 - 100 mg/dL    BUN 20 6 - 23 MG/DL    Creatinine 1.20 0.8 - 1.5 MG/DL    GFR est AA >60 >60 ml/min/1.73m2    GFR est non-AA >60 >60 ml/min/1.73m2    Calcium 9.0 8.3 - 10.4 MG/DL   GLUCOSE, POC    Collection Time: 04/08/22  7:39 AM   Result Value Ref Range    Glucose (POC) 260 (H) 65 - 100 mg/dL    [facility]    GLUCOSE, POC    Collection Time: 04/08/22 11:09 AM   Result Value Ref Range    Glucose (POC) 336 (H) 65 - 100 mg/dL    Performed by [facility]    GLUCOSE, POC    Collection Time: 04/08/22  4:26 PM   Result Value Ref Range    Glucose (POC) 307 (H) 65 - 100 mg/dL    Performed by [facility]    GLUCOSE, POC    Collection Time: 04/08/22  8:17 PM   Result Value Ref Range    Glucose (POC) 301 (H) 65 - 100 mg/dL    Performed by [facility]    METABOLIC PANEL, BASIC    Collection Time: 04/09/22  5:40 AM   Result Value Ref Range    Sodium 141 138 - 145 mmol/L    Potassium 3.6 3.5 - 5.1 mmol/L    Chloride 109 (H) 98 - 107 mmol/L    CO2 23 21 - 32 mmol/L    Anion gap 9 7 - 16 mmol/L    Glucose 212 (H) 65 - 100 mg/dL    BUN 19 6 - 23 MG/DL    Creatinine 1.10 0.8 - 1.5 MG/DL    GFR est AA >60 >60 ml/min/1.73m2    GFR est non-AA >60 >60 ml/min/1.73m2    Calcium 9.1 8.3 - 10.4 MG/DL   GLUCOSE, POC    Collection Time: 04/09/22  7:07 AM   Result Value Ref Range    Glucose (POC) 212 (H) 65 - 100 mg/dL    Performed by Zeke    GLUCOSE, POC    Collection Time: 04/09/22 10:48 AM   Result Value Ref Range    Glucose (POC) 290 (H) 65 - 100 mg/dL    Performed by Zeke        All Micro Results     None          Other Studies:  No results found.     Current Meds:  Current Facility-Administered Medications   Medication Dose Route Frequency    glipiZIDE (GLUCOTROL) tablet 5 mg  5 mg Oral ACB&D    metFORMIN (GLUCOPHAGE) tablet 1,000 mg  1,000 mg Oral BID WITH MEALS    lisinopriL (PRINIVIL, ZESTRIL) tablet 10 mg  10 mg Oral DAILY    sodium chloride (NS) flush 5-10 mL  5-10 mL IntraVENous Q8H    sodium chloride (NS) flush 5-10 mL  5-10 mL IntraVENous PRN    insulin lispro (HUMALOG) injection   SubCUTAneous AC&HS    sodium chloride (NS) flush 5-40 mL  5-40 mL IntraVENous Q8H    sodium chloride (NS) flush 5-40 mL  5-40 mL IntraVENous PRN    acetaminophen (TYLENOL) tablet 650 mg  650 mg Oral Q6H PRN    Or    acetaminophen (TYLENOL) suppository 650 mg  650 mg Rectal Q6H PRN    polyethylene glycol (MIRALAX) packet 17 g  17 g Oral DAILY PRN    ondansetron (ZOFRAN ODT) tablet 4 mg  4 mg Oral Q8H PRN    Or    ondansetron (ZOFRAN) injection 4 mg  4 mg IntraVENous Q6H PRN    enoxaparin (LOVENOX) injection 40 mg  40 mg SubCUTAneous Q24H       Signed:  Naima Mccann MD

## 2022-04-09 NOTE — PROGRESS NOTES
Hourly rounds completed. All needs met. Pt successfully administered subq insulin injections with Primary RN present. Gave report to the oncoming day shift nurse.

## 2022-04-09 NOTE — PROGRESS NOTES
Hospitalist Progress Note   Admit Date:  2022  4:21 PM   Name:  Lauren Hayden   Age:  56 y.o. Sex:  male  :  1965   MRN:  [id number]   Room:  [facility]/    Presenting Complaint: No chief complaint on file. Reason(s) for Admission: New onset type 2 diabetes mellitus ([facility] [E11.9]  LINDA (acute kidney injury) ([facility] [N17.9]  Elevated blood pressure reading [R03.0]     Hospital Course & Interval History:   Patient is a 56 y.o. male who presented to the ED for cc increased urination and fatigue for the past two weeks. Nothing seems to make better or worse. No past medical hx but does have a family hx of DM type II and HTN.    Vitals - mild tachycardia, BP elevated     Labs- Glucose 426. WBC 12.1. Createine 1.7 from baseline 1.1. Subjective/24hr Events (22):  22  Says feeling good, mild hyperglycemic    22  Mild hyperglycemia  Started on Metformin and glipizide yesterday  On sliding scale insulin  Assessment & Plan:     Principal Problem:    New onset type 2 diabetes mellitus ([facility] (2022)  22-increase Lantus to 22, continue sliding scale insulin. Later on today DC'd Lantus, started on metformin 1 g twice daily and glipizide 5 mg twice daily  2022-on Metformin and glipizide and sliding scale    LINDA  On IV fluids, resolved  2022-resolved, DC IV fluids    Hypertension  Started on lisinopril 5 mg daily  22-increase lisinopril to 10 mg daily        Diet:  ADULT DIET Regular; 4 carb choices (60 gm/meal);  Low Fat/Low Chol/High Fiber/2 gm Na  DVT PPx: Lovenox  Code status: Full Code    Hospital Problems as of 2022 Date Reviewed: 2022          Codes Class Noted - Resolved POA    Elevated blood pressure reading ICD-10-CM: R03.0  ICD-9-CM: 796.2  2022 - Present Unknown        LINDA (acute kidney injury) ([facility] ICD-10-CM: N17.9  ICD-9-CM: 584.9  2022 - Present Unknown        * (Principal) New onset type 2 diabetes mellitus ([facility] ICD-10-CM: E11.9  ICD-9-CM: 250.00 4/7/2022 - Present Unknown        Obesity (BMI 35.0-39.9 without comorbidity) ICD-10-CM: E66.9  ICD-9-CM: 278.00  1/21/2022 - Present Yes              Objective:     Patient Vitals for the past 24 hrs:   Temp Pulse Resp BP SpO2   04/09/22 1051 97.7 °F (36.5 °C) 100 20 (!) 143/91 97 %   04/09/22 0710 97.5 °F (36.4 °C) 86 16 126/75 96 %   04/09/22 0419 98.2 °F (36.8 °C) 85 18 126/83 99 %   04/08/22 2256 99 °F (37.2 °C) 97 17 (!) 148/86 97 %   04/08/22 1910 98.9 °F (37.2 °C) 87 16 (!) 119/47 96 %     Oxygen Therapy  O2 Sat (%): 97 % (04/09/22 1051)  Pulse via Oximetry: 92 beats per minute (04/07/22 1844)    Estimated body mass index is 34.17 kg/m² as calculated from the following:    Height as of this encounter: 6' 1\" (1.854 m). Weight as of this encounter: 117.5 kg (259 lb). No intake or output data in the 24 hours ending 04/09/22 1248      Physical Exam:     Blood pressure (!) 143/91, pulse 100, temperature 97.7 °F (36.5 °C), resp. rate 20, height 6' 1\" (1.854 m), weight 117.5 kg (259 lb), SpO2 97 %. General:    Well nourished. No overt distress  Head:  Normocephalic, atraumatic  Eyes:  Sclerae appear normal.  Pupils equally round. ENT:  Nares appear normal, no drainage. Moist oral mucosa  Neck:  No restricted ROM. Trachea midline   CV:   RRR. No m/r/g. No jugular venous distension. Lungs:   CTAB. No wheezing, rhonchi, or rales. Respirations even, unlabored  Abdomen: Bowel sounds present. Soft, nontender, nondistended. Extremities: No cyanosis or clubbing. No edema  Skin:     No rashes and normal coloration. Warm and dry. Neuro:  CN II-XII grossly intact. Sensation intact. A&Ox3  Psych:  Normal mood and affect.       I have reviewed ordered lab tests and independently visualized imaging below:    Recent Labs:  Recent Results (from the past 48 hour(s))   GLUCOSE, POC    Collection Time: 04/07/22  3:46 PM   Result Value Ref Range    Glucose (POC) 325 (H) 65 - 100 mg/dL    Performed by Yeh(Chamber)Manuelito    CBC WITH AUTOMATED DIFF    Collection Time: 04/07/22  3:47 PM   Result Value Ref Range    WBC 12.1 (H) 4.3 - 11.1 K/uL    RBC 5.81 (H) 4.23 - 5.6 M/uL    HGB 15.0 13.6 - 17.2 g/dL    HCT 47.3 41.1 - 50.3 %    MCV 81.4 79.6 - 97.8 FL    MCH 25.8 (L) 26.1 - 32.9 PG    MCHC 31.7 31.4 - 35.0 g/dL    RDW 14.5 11.9 - 14.6 %    PLATELET 697 473 - 532 K/uL    MPV 11.8 9.4 - 12.3 FL    ABSOLUTE NRBC 0.00 0.0 - 0.2 K/uL    DF AUTOMATED      NEUTROPHILS 60 43 - 78 %    LYMPHOCYTES 34 13 - 44 %    MONOCYTES 5 4.0 - 12.0 %    EOSINOPHILS 0 (L) 0.5 - 7.8 %    BASOPHILS 0 0.0 - 2.0 %    IMMATURE GRANULOCYTES 0 0.0 - 5.0 %    ABS. NEUTROPHILS 7.2 1.7 - 8.2 K/UL    ABS. LYMPHOCYTES 4.1 0.5 - 4.6 K/UL    ABS. MONOCYTES 0.7 0.1 - 1.3 K/UL    ABS. EOSINOPHILS 0.0 0.0 - 0.8 K/UL    ABS. BASOPHILS 0.1 0.0 - 0.2 K/UL    ABS. IMM. GRANS. 0.0 0.0 - 0.5 K/UL   METABOLIC PANEL, COMPREHENSIVE    Collection Time: 04/07/22  3:47 PM   Result Value Ref Range    Sodium 136 136 - 145 mmol/L    Potassium 5.1 3.5 - 5.1 mmol/L    Chloride 102 98 - 107 mmol/L    CO2 18 (L) 21 - 32 mmol/L    Anion gap 16 7 - 16 mmol/L    Glucose 375 (H) 65 - 100 mg/dL    BUN 26 (H) 6 - 23 MG/DL    Creatinine 1.70 (H) 0.8 - 1.5 MG/DL    GFR est AA 54 (L) >60 ml/min/1.73m2    GFR est non-AA 45 (L) >60 ml/min/1.73m2    Calcium 9.7 8.3 - 10.4 MG/DL    Bilirubin, total 0.6 0.2 - 1.1 MG/DL    ALT (SGPT) 25 12 - 65 U/L    AST (SGOT) 21 15 - 37 U/L    Alk.  phosphatase 99 50 - 136 U/L    Protein, total 9.6 (H) 6.3 - 8.2 g/dL    Albumin 4.1 3.5 - 5.0 g/dL    Globulin 5.5 (H) 2.3 - 3.5 g/dL    A-G Ratio 0.7 (L) 1.2 - 3.5     HEMOGLOBIN A1C WITH EAG    Collection Time: 04/07/22  3:47 PM   Result Value Ref Range    Hemoglobin A1c 10.4 (H) 4.20 - 6.30 %    Est. average glucose 252 mg/dL   EKG, 12 LEAD, INITIAL    Collection Time: 04/07/22  3:50 PM   Result Value Ref Range    Ventricular Rate 99 BPM    Atrial Rate 99 BPM    P-R Interval 150 ms    QRS Duration 74 ms    Q-T Interval 330 ms    QTC Calculation (Bezet) 423 ms    Calculated P Axis 77 degrees    Calculated R Axis 53 degrees    Calculated T Axis 41 degrees    Diagnosis       Normal sinus rhythm  Left atrial enlargement  Borderline ECG  No previous ECGs available  Confirmed by Daisy Meléndez MD (), OTIS REYNOLDS ([id number]) on 4/8/2022 2:04:23 PM     POC URINE MACROSCOPIC    Collection Time: 04/07/22  5:05 PM   Result Value Ref Range    Color YELLOW      Appearance CLEAR      Spec. gravity (POC) >1.030 (H) 1.001 - 1.023    pH, urine  (POC) 5.5 5.0 - 9.0      Protein (POC) 30 (A) NEG mg/dL    Glucose, urine (POC) 500 (A) NEG mg/dL    Ketones (POC) >160 (A) NEG mg/dL    Bilirubin (POC) Negative NEG      Blood (POC) Trace Hgb (A) NEG      Urobilinogen (POC) 0.2 0.2 - 1.0 EU/dL    Nitrite (POC) Negative NEG      Leukocyte esterase (POC) Negative NEG     POC VENOUS BLOOD GAS    Collection Time: 04/07/22  5:35 PM   Result Value Ref Range    Device: ROOM AIR      FIO2 (POC) 21 %    pH, venous (POC) 7.27 (L) 7.32 - 7.42      pCO2, venous (POC) 35.3 (L) 41 - 51 MMHG    pO2, venous (POC) 28 (LL) mmHg    HCO3, venous (POC) 16.3 (L) 23 - 28 MMOL/L    sO2, venous (POC) 44.0 (L) 65 - 88 %    Base deficit, venous (POC) 9.6 mmol/L    Allens test (POC) NOT APPLICABLE      Specimen type (POC) VENOUS BLOOD      Performed by Krystle     Critical value read back DRSHEORN    GLUCOSE, POC    Collection Time: 04/07/22  6:27 PM   Result Value Ref Range    Glucose (POC) 300 (H) 65 - 100 mg/dL    Performed by Lore    GLUCOSE, POC    Collection Time: 04/07/22  8:01 PM   Result Value Ref Range    Glucose (POC) 289 (H) 65 - 100 mg/dL    Performed by Ruby    GLUCOSE, POC    Collection Time: 04/07/22  8:38 PM   Result Value Ref Range    Glucose (POC) 292 (H) 65 - 100 mg/dL    Performed by [facility]    METABOLIC PANEL, BASIC    Collection Time: 04/08/22  6:45 AM   Result Value Ref Range    Sodium 142 136 - 145 mmol/L    Potassium 4.0 3.5 - 5.1 mmol/L    Chloride 109 (H) 98 - 107 mmol/L    CO2 21 21 - 32 mmol/L    Anion gap 12 7 - 16 mmol/L    Glucose 271 (H) 65 - 100 mg/dL    BUN 20 6 - 23 MG/DL    Creatinine 1.20 0.8 - 1.5 MG/DL    GFR est AA >60 >60 ml/min/1.73m2    GFR est non-AA >60 >60 ml/min/1.73m2    Calcium 9.0 8.3 - 10.4 MG/DL   GLUCOSE, POC    Collection Time: 04/08/22  7:39 AM   Result Value Ref Range    Glucose (POC) 260 (H) 65 - 100 mg/dL    [facility]    GLUCOSE, POC    Collection Time: 04/08/22 11:09 AM   Result Value Ref Range    Glucose (POC) 336 (H) 65 - 100 mg/dL    Performed by [facility]    GLUCOSE, POC    Collection Time: 04/08/22  4:26 PM   Result Value Ref Range    Glucose (POC) 307 (H) 65 - 100 mg/dL    [facility]    GLUCOSE, POC    Collection Time: 04/08/22  8:17 PM   Result Value Ref Range    Glucose (POC) 301 (H) 65 - 100 mg/dL    Performed by [facility]    METABOLIC PANEL, BASIC    Collection Time: 04/09/22  5:40 AM   Result Value Ref Range    Sodium 141 138 - 145 mmol/L    Potassium 3.6 3.5 - 5.1 mmol/L    Chloride 109 (H) 98 - 107 mmol/L    CO2 23 21 - 32 mmol/L    Anion gap 9 7 - 16 mmol/L    Glucose 212 (H) 65 - 100 mg/dL    BUN 19 6 - 23 MG/DL    Creatinine 1.10 0.8 - 1.5 MG/DL    GFR est AA >60 >60 ml/min/1.73m2    GFR est non-AA >60 >60 ml/min/1.73m2    Calcium 9.1 8.3 - 10.4 MG/DL   GLUCOSE, POC    Collection Time: 04/09/22  7:07 AM   Result Value Ref Range    Glucose (POC) 212 (H) 65 - 100 mg/dL    Performed by Zeke    GLUCOSE, POC    Collection Time: 04/09/22 10:48 AM   Result Value Ref Range    Glucose (POC) 290 (H) 65 - 100 mg/dL    Performed by Zeke        All Micro Results     None          Other Studies:  No results found.     Current Meds:  Current Facility-Administered Medications   Medication Dose Route Frequency    glipiZIDE (GLUCOTROL) tablet 5 mg  5 mg Oral ACB&D    metFORMIN (GLUCOPHAGE) tablet 1,000 mg  1,000 mg Oral BID WITH MEALS    lisinopriL (PRINIVIL, ZESTRIL) tablet 10 mg  10 mg Oral DAILY    sodium chloride (NS) flush 5-10 mL  5-10 mL IntraVENous Q8H    sodium chloride (NS) flush 5-10 mL  5-10 mL IntraVENous PRN    insulin lispro (HUMALOG) injection   SubCUTAneous AC&HS    sodium chloride (NS) flush 5-40 mL  5-40 mL IntraVENous Q8H    sodium chloride (NS) flush 5-40 mL  5-40 mL IntraVENous PRN    acetaminophen (TYLENOL) tablet 650 mg  650 mg Oral Q6H PRN    Or    acetaminophen (TYLENOL) suppository 650 mg  650 mg Rectal Q6H PRN    polyethylene glycol (MIRALAX) packet 17 g  17 g Oral DAILY PRN    ondansetron (ZOFRAN ODT) tablet 4 mg  4 mg Oral Q8H PRN    Or    ondansetron (ZOFRAN) injection 4 mg  4 mg IntraVENous Q6H PRN    enoxaparin (LOVENOX) injection 40 mg  40 mg SubCUTAneous Q24H       Signed:  Nellie Koyanagi, MD

## 2022-04-10 VITALS
TEMPERATURE: 98.4 F | HEART RATE: 74 BPM | RESPIRATION RATE: 14 BRPM | HEIGHT: 73 IN | DIASTOLIC BLOOD PRESSURE: 84 MMHG | WEIGHT: 259 LBS | OXYGEN SATURATION: 100 % | SYSTOLIC BLOOD PRESSURE: 136 MMHG | BODY MASS INDEX: 34.33 KG/M2

## 2022-04-10 PROBLEM — I10 HTN (HYPERTENSION): Status: ACTIVE | Noted: 2022-04-10

## 2022-04-10 PROBLEM — E87.6 HYPOKALEMIA: Status: ACTIVE | Noted: 2022-04-10

## 2022-04-10 LAB
ANION GAP SERPL CALC-SCNC: 9 MMOL/L (ref 7–16)
BUN SERPL-MCNC: 15 MG/DL (ref 6–23)
CALCIUM SERPL-MCNC: 8.9 MG/DL (ref 8.3–10.4)
CHLORIDE SERPL-SCNC: 106 MMOL/L (ref 98–107)
CO2 SERPL-SCNC: 25 MMOL/L (ref 21–32)
CREAT SERPL-MCNC: 0.9 MG/DL (ref 0.8–1.5)
GLUCOSE BLD STRIP.AUTO-MCNC: 230 MG/DL (ref 65–100)
GLUCOSE BLD STRIP.AUTO-MCNC: 264 MG/DL (ref 65–100)
GLUCOSE SERPL-MCNC: 243 MG/DL (ref 65–100)
POTASSIUM SERPL-SCNC: 3.4 MMOL/L (ref 3.5–5.1)
SERVICE CMNT-IMP: ABNORMAL
SERVICE CMNT-IMP: ABNORMAL
SODIUM SERPL-SCNC: 140 MMOL/L (ref 136–145)

## 2022-04-10 PROCEDURE — 36415 COLL VENOUS BLD VENIPUNCTURE: CPT

## 2022-04-10 PROCEDURE — 74011636637 HC RX REV CODE- 636/637: Performed by: FAMILY MEDICINE

## 2022-04-10 PROCEDURE — 74011250637 HC RX REV CODE- 250/637: Performed by: FAMILY MEDICINE

## 2022-04-10 PROCEDURE — 80048 BASIC METABOLIC PNL TOTAL CA: CPT

## 2022-04-10 PROCEDURE — 82962 GLUCOSE BLOOD TEST: CPT

## 2022-04-10 RX ORDER — LANOLIN ALCOHOL/MO/W.PET/CERES
400 CREAM (GRAM) TOPICAL DAILY
Qty: 4 TABLET | Refills: 0 | Status: SHIPPED | OUTPATIENT
Start: 2022-04-10 | End: 2022-04-18 | Stop reason: ALTCHOICE

## 2022-04-10 RX ORDER — INSULIN GLARGINE 100 [IU]/ML
INJECTION, SOLUTION SUBCUTANEOUS
Qty: 2 ADJUSTABLE DOSE PRE-FILLED PEN SYRINGE | Refills: 1 | Status: SHIPPED | OUTPATIENT
Start: 2022-04-10 | End: 2022-04-18 | Stop reason: SDUPTHER

## 2022-04-10 RX ORDER — PEN NEEDLE, DIABETIC 31 GX3/16"
NEEDLE, DISPOSABLE MISCELLANEOUS
Qty: 100 PEN NEEDLE | Refills: 1 | Status: SHIPPED | OUTPATIENT
Start: 2022-04-10 | End: 2022-04-18 | Stop reason: SDUPTHER

## 2022-04-10 RX ORDER — LISINOPRIL 10 MG/1
10 TABLET ORAL DAILY
Qty: 30 TABLET | Refills: 0 | Status: SHIPPED | OUTPATIENT
Start: 2022-04-11 | End: 2022-04-18 | Stop reason: SDUPTHER

## 2022-04-10 RX ORDER — POTASSIUM CHLORIDE 750 MG/1
10 TABLET, EXTENDED RELEASE ORAL DAILY
Qty: 4 TABLET | Refills: 0 | Status: SHIPPED | OUTPATIENT
Start: 2022-04-10 | End: 2022-04-18 | Stop reason: ALTCHOICE

## 2022-04-10 RX ORDER — METFORMIN HYDROCHLORIDE 1000 MG/1
1000 TABLET ORAL 2 TIMES DAILY WITH MEALS
Qty: 60 TABLET | Refills: 0 | Status: SHIPPED | OUTPATIENT
Start: 2022-04-10 | End: 2022-04-18 | Stop reason: SDUPTHER

## 2022-04-10 RX ORDER — INSULIN PUMP SYRINGE, 3 ML
EACH MISCELLANEOUS
Qty: 1 KIT | Refills: 0 | Status: SHIPPED | OUTPATIENT
Start: 2022-04-10

## 2022-04-10 RX ORDER — LANCETS
EACH MISCELLANEOUS
Qty: 1 EACH | Refills: 11 | Status: SHIPPED | OUTPATIENT
Start: 2022-04-10 | End: 2022-04-18 | Stop reason: SDUPTHER

## 2022-04-10 RX ORDER — POTASSIUM CHLORIDE 20 MEQ/1
40 TABLET, EXTENDED RELEASE ORAL EVERY 4 HOURS
Status: COMPLETED | OUTPATIENT
Start: 2022-04-10 | End: 2022-04-10

## 2022-04-10 RX ORDER — INSULIN GLARGINE 100 [IU]/ML
20 INJECTION, SOLUTION SUBCUTANEOUS ONCE
Status: COMPLETED | OUTPATIENT
Start: 2022-04-10 | End: 2022-04-10

## 2022-04-10 RX ORDER — INSULIN ASPART 100 [IU]/ML
INJECTION, SOLUTION INTRAVENOUS; SUBCUTANEOUS
Qty: 5 ADJUSTABLE DOSE PRE-FILLED PEN SYRINGE | Refills: 1 | Status: SHIPPED | OUTPATIENT
Start: 2022-04-10 | End: 2022-04-18 | Stop reason: SDUPTHER

## 2022-04-10 RX ORDER — GLIPIZIDE 5 MG/1
5 TABLET ORAL
Qty: 60 TABLET | Refills: 0 | Status: SHIPPED | OUTPATIENT
Start: 2022-04-10 | End: 2022-04-18 | Stop reason: SDUPTHER

## 2022-04-10 RX ADMIN — INSULIN GLARGINE 20 UNITS: 100 INJECTION, SOLUTION SUBCUTANEOUS at 12:01

## 2022-04-10 RX ADMIN — LISINOPRIL 10 MG: 5 TABLET ORAL at 08:10

## 2022-04-10 RX ADMIN — GLIPIZIDE 5 MG: 5 TABLET ORAL at 08:07

## 2022-04-10 RX ADMIN — INSULIN LISPRO 6 UNITS: 100 INJECTION, SOLUTION INTRAVENOUS; SUBCUTANEOUS at 11:35

## 2022-04-10 RX ADMIN — POTASSIUM CHLORIDE 40 MEQ: 20 TABLET, EXTENDED RELEASE ORAL at 08:38

## 2022-04-10 RX ADMIN — POTASSIUM CHLORIDE 40 MEQ: 20 TABLET, EXTENDED RELEASE ORAL at 12:02

## 2022-04-10 RX ADMIN — METFORMIN HYDROCHLORIDE 1000 MG: 500 TABLET ORAL at 08:08

## 2022-04-10 RX ADMIN — INSULIN LISPRO 6 UNITS: 100 INJECTION, SOLUTION INTRAVENOUS; SUBCUTANEOUS at 08:07

## 2022-04-10 NOTE — DISCHARGE INSTRUCTIONS
FOLLOW UP WITH PCP IN A WEEK WITH BMP AND MAGNESIUM. KEEP A LOG OF BLOOD SUGAR VALUES AND SHOW IT TO PCP AT FOLLOW UP.  DIABETIC DIET AND LOW SODIUM DIET.

## 2022-04-10 NOTE — DISCHARGE SUMMARY
Hospitalist Discharge Summary     Admit Date:  2022  4:21 PM   Name:  Nimisha Carvalho   Age:  56 y.o.  :  1965   MRN:  [id number]   PCP:  JED Santos  Treatment Team: Attending Provider: Yamileth Costa MD; Hospitalist: Yamileth Costa MD; Care Manager: Anibal Gaines    Problem List for this Hospitalization:  Hospital Problems as of 4/10/2022 Date Reviewed: 2022          Codes Class Noted - Resolved POA    Hypokalemia ICD-10-CM: E87.6  ICD-9-CM: 276.8  4/10/2022 - Present Unknown        HTN (hypertension) ICD-10-CM: I10  ICD-9-CM: 401.9  4/10/2022 - Present Unknown        Elevated blood pressure reading ICD-10-CM: R03.0  ICD-9-CM: 796.2  2022 - Present Unknown        LINDA (acute kidney injury) ([facility] ICD-10-CM: N17.9  ICD-9-CM: 584.9  2022 - Present Unknown        * (Principal) New onset type 2 diabetes mellitus ([facility] ICD-10-CM: E11.9  ICD-9-CM: 250.00  2022 - Present Unknown        Obesity (BMI 35.0-39.9 without comorbidity) ICD-10-CM: E66.9  ICD-9-CM: 278.00  2022 - Present Yes                Admission HPI from 2022:    Patient is a 56 y.o. male who presented to the ED for cc increased urination and fatigue for the past two weeks. Nothing seems to make better or worse. No past medical hx but does have a family hx of DM type II and HTN.    Vitals - mild tachycardia, BP elevated     Labs- Glucose 426. WBC 12.1. Createine 1.7 from baseline 1.1. Hospital Course:    New diabetes mellitus type 2  Hemoglobin A1c of 10.4  During the stay had a diabetic teaching with the diabetic management nurse. During the stay he has been on Metformin, glipizide, sliding scale insulin and Lantus. Discharged on Basaglar 20 units daily, Metformin 1 g twice daily, glipizide 5 mg twice daily, continue on sliding scale with NovoLog. Advised to be compliant with her diabetic diet.   Advised to keep a log of all the blood sugar levels and show to primary care physician at follow-up. LINDA  Continue on IV fluids, LINDA resolved, discharge creatinine 1.9, GFR greater than 60    Hypokalemia  Replaced with 40 mg of potassium chloride q. hourly x2  At discharge was given a prescription for 10 mg of potassium chloride daily for 4 days  Also gave a prescription for magnesium oxide to 40 mg daily for 4 days    Hypertension  On and off for blood pressure is in 130s to 140s. Discharged on lisinopril 10 mg p.o. daily. Advised to follow-up with primary care physician within a week. Follow up instructions below. Plan was discussed with patient and spouse. All questions answered. Patient was stable at time of discharge and was instructed to call or return if there are any concerns or recurrence of symptoms. Diagnostic Imaging/Tests:   No results found. Echocardiogram results:  No results found for this visit on 04/07/22.       All Micro Results     None          Labs: Results:       BMP, Mg, Phos Recent Labs     04/10/22  0523 04/09/22  0540 04/08/22  0645    141 142   K 3.4* 3.6 4.0    109* 109*   CO2 25 23 21   AGAP 9 9 12   BUN 15 19 20   CREA 0.90 1.10 1.20   CA 8.9 9.1 9.0   * 212* 271*      CBC Recent Labs     04/07/22  1547   WBC 12.1*   RBC 5.81*   HGB 15.0   HCT 47.3      GRANS 60   LYMPH 34   EOS 0*   MONOS 5   BASOS 0   IG 0   ANEU 7.2   ABL 4.1   AL 0.0   ABM 0.7   ABB 0.1   AIG 0.0      LFT Recent Labs     04/07/22  1547   ALT 25   AP 99   TP 9.6*   ALB 4.1   GLOB 5.5*   AGRAT 0.7*      Cardiac Testing No results found for: BNPP, BNP, CPK, RCK1, RCK2, RCK3, RCK4, CKMB, CKNDX, CKND1, TROPT, TROIQ   Coagulation Tests No results found for: PTP, INR, APTT, INREXT, INREXT   A1c Lab Results   Component Value Date/Time    Hemoglobin A1c 10.4 (H) 04/07/2022 03:47 PM      Lipid Panel Lab Results   Component Value Date/Time    Cholesterol, total 170 01/21/2022 11:18 AM    HDL Cholesterol 33 (L) 01/21/2022 11:18 AM    LDL, calculated 118 (H) 01/21/2022 11:18 AM    VLDL, calculated 19 01/21/2022 11:18 AM    Triglyceride 100 01/21/2022 11:18 AM      Thyroid Panel Lab Results   Component Value Date/Time    TSH 0.883 01/21/2022 11:18 AM        Most Recent UA No results found for: COLOR, APPRN, REFSG, JESSICA, PROTU, GLUCU, KETU, BILU, BLDU, UROU, MACARIO, LEUKU     No Known Allergies    There is no immunization history on file for this patient.     All Labs from Last 24 Hrs:  Recent Results (from the past 24 hour(s))   GLUCOSE, POC    Collection Time: 04/09/22  3:44 PM   Result Value Ref Range    Glucose (POC) 243 (H) 65 - 100 mg/dL    Performed by Khadra Davalos    GLUCOSE, POC    Collection Time: 04/09/22  8:26 PM   Result Value Ref Range    Glucose (POC) 229 (H) 65 - 100 mg/dL    [facility] by [facility]    METABOLIC PANEL, BASIC    Collection Time: 04/10/22  5:23 AM   Result Value Ref Range    Sodium 140 136 - 145 mmol/L    Potassium 3.4 (L) 3.5 - 5.1 mmol/L    Chloride 106 98 - 107 mmol/L    CO2 25 21 - 32 mmol/L    Anion gap 9 7 - 16 mmol/L    Glucose 243 (H) 65 - 100 mg/dL    BUN 15 6 - 23 MG/DL    Creatinine 0.90 0.8 - 1.5 MG/DL    GFR est AA >60 >60 ml/min/1.73m2    GFR est non-AA >60 >60 ml/min/1.73m2    Calcium 8.9 8.3 - 10.4 MG/DL   GLUCOSE, POC    Collection Time: 04/10/22  7:44 AM   Result Value Ref Range    Glucose (POC) 264 (H) 65 - 100 mg/dL    Performed by Aziza    GLUCOSE, POC    Collection Time: 04/10/22 11:05 AM   Result Value Ref Range    Glucose (POC) 230 (H) 65 - 100 mg/dL    Performed by Aziza        Discharge Exam:  Patient Vitals for the past 24 hrs:   Temp Pulse Resp BP SpO2   04/10/22 1104 98.4 °F (36.9 °C) 74 14 136/84 100 %   04/10/22 0745 97.9 °F (36.6 °C) 76 17 122/82 99 %   04/10/22 0406 98.7 °F (37.1 °C) 83 18 117/75 97 %   04/09/22 2251 98.7 °F (37.1 °C) 97 17 (!) 149/82 99 %   04/09/22 1933 98.7 °F (37.1 °C) 86 17 138/83 99 %   04/09/22 1547 98.3 °F (36.8 °C) 88 18 132/79 --     Oxygen Therapy  O2 Sat (%): 100 % (04/10/22 1104)  Pulse via Oximetry: 92 beats per minute (04/07/22 1844)  O2 Device: None (Room air) (04/10/22 1104)  No intake or output data in the 24 hours ending 04/10/22 1309    General:    Well nourished. Alert. No distress. Eyes:   Normal sclera. Extraocular movements intact. ENT:  Normocephalic, atraumatic. Moist mucous membranes  CV:   Regular rate and rhythm. No murmur, rub, or gallop. Lungs:  Clear to auscultation bilaterally. No wheezing, rhonchi, or rales. Abdomen: Soft, nontender, nondistended. Bowel sounds normal.   Extremities: Warm and dry. No cyanosis or edema. Neurologic: CN II-XII grossly intact. Sensation intact. Skin:     No rashes or jaundice. Psych:  Normal mood and affect. Discharge Info:   Current Discharge Medication List      START taking these medications    Details   glipiZIDE (GLUCOTROL) 5 mg tablet Take 1 Tablet by mouth Before breakfast and dinner. Qty: 60 Tablet, Refills: 0  Start date: 4/10/2022      lisinopriL (PRINIVIL, ZESTRIL) 10 mg tablet Take 1 Tablet by mouth daily. Qty: 30 Tablet, Refills: 0  Start date: 4/11/2022      metFORMIN (GLUCOPHAGE) 1,000 mg tablet Take 1 Tablet by mouth two (2) times daily (with meals). Qty: 60 Tablet, Refills: 0  Start date: 4/10/2022      insulin glargine (Basaglar KwikPen U-100 Insulin) 100 unit/mL (3 mL) inpn E 11.65. 20 units sq q daily. Qty: 2 Adjustable Dose Pre-filled Pen Syringe, Refills: 1  Start date: 4/10/2022      insulin aspart U-100 (NOVOLOG) 100 unit/mL (3 mL) inpn E11.65. MAX OF 40 UNITS/DAY. CHECK GLUCOSE AC AND QHS. For Blood Sugar (mg/dL) of:   Less than 150 =   0 units  ,       150 -199 =   2 units, 200 -249 =   4 units, 250 -299 =   6 units, 300 -349 =   8 units, 350 and above = 10 units  Qty: 5 Adjustable Dose Pre-filled Pen Syringe, Refills: 1  Start date: 4/10/2022      Insulin Needles, Disposable, 32 gauge x 5/32\" ndle E11.65. CHECK GLUCOSE AC AND QHS.   Qty: 100 Pen Needle, Refills: 1  Start date: 4/10/2022      lancets misc E11.65. CHECK GLUCOSE AC AND QHS. Qty: 1 Each, Refills: 11  Start date: 4/10/2022      glucose blood VI test strips (ASCENSIA AUTODISC VI, ONE TOUCH ULTRA TEST VI) strip E 11.65. CHECK GLUCOSE AC AND QHS. Qty: 100 Strip, Refills: 1  Start date: 4/10/2022      Blood-Glucose Meter monitoring kit E11.65. CHECK GLUCOSE AC AND QHS. Qty: 1 Kit, Refills: 0  Start date: 4/10/2022      potassium chloride (KLOR-CON M10) 10 mEq tablet Take 1 Tablet by mouth daily. Qty: 4 Tablet, Refills: 0  Start date: 4/10/2022      magnesium oxide (MAG-OX) 400 mg tablet Take 1 Tablet by mouth daily. Qty: 4 Tablet, Refills: 0  Start date: 4/10/2022         STOP taking these medications       guaifenesin/pseudoephedrne HCl (MUCINEX D PO) Comments:   Reason for Stopping:         acetaminophen (TYLENOL PO) Comments:   Reason for Stopping:         predniSONE (DELTASONE) 20 mg tablet Comments:   Reason for Stopping:                 Disposition: Home  Activity: Ad aden. Diet: ADULT DIET Regular; 4 carb choices (60 gm/meal); Low Fat/Low Chol/High Fiber/2 gm Na    Follow-up Appointments   Procedures    FOLLOW UP VISIT Appointment in: One Week FOLLOW UP WITH PCP IN A WEEK WITH BMP AND MAGNESIUM. KEEP A LOG OF BLOOD SUGAR VALUES AND SHOW IT TO PCP AT FOLLOW UP. DIABETIC DIET AND LOW SODIUM DIET. FOLLOW UP WITH PCP IN A WEEK WITH BMP AND MAGNESIUM. KEEP A LOG OF BLOOD SUGAR VALUES AND SHOW IT TO PCP AT FOLLOW UP.  DIABETIC DIET AND LOW SODIUM DIET. Standing Status:   Standing     Number of Occurrences:   1     Order Specific Question:   Appointment in     Answer: One Week         Follow-up Information     Follow up With Specialties Details Why Contact Info    Nata Fallon, [facility]   [address] [place] [address]  [phone]            Time spent in patient discharge planning and coordination 35 minutes.     Signed:  Zoya Miranda MD

## 2022-04-10 NOTE — PROGRESS NOTES
Pt is for discharge home today with family and no needs/supportive care orders recieved for CM at this time. Care Management Interventions  PCP Verified by CM: Yes  Mode of Transport at Discharge: Other (see comment) (Spouse Olga Blair [phone].)  Transition of Care Consult (CM Consult): Discharge Planning  Discharge Durable Medical Equipment: No  Physical Therapy Consult: No  Occupational Therapy Consult: No  Support Systems: Spouse/Significant Other  Confirm Follow Up Transport: Self  The Plan for Transition of Care is Related to the Following Treatment Goals : Return to Baseline. The Patient and/or Patient Representative was Provided with a Choice of Provider and Agrees with the Discharge Plan?: Yes  Name of the Patient Representative Who was Provided with a Choice of Provider and Agrees with the Discharge Plan: Patient.    Resource Information Provided?: No  Discharge Location  Patient Expects to be Discharged to[de-identified] Home

## 2022-04-10 NOTE — PROGRESS NOTES
Pt is for discharge home today with family and no needs/supportive care orders recieved for CM at this time. Care Management Interventions  PCP Verified by CM: Yes  Mode of Transport at Discharge: Other (see comment) (Spouse Tesfaye Reddy [phone].)  Transition of Care Consult (CM Consult): Discharge Planning  Discharge Durable Medical Equipment: No  Physical Therapy Consult: No  Occupational Therapy Consult: No  Support Systems: Spouse/Significant Other  Confirm Follow Up Transport: Self  The Plan for Transition of Care is Related to the Following Treatment Goals : Return to Baseline. The Patient and/or Patient Representative was Provided with a Choice of Provider and Agrees with the Discharge Plan?: Yes  Name of the Patient Representative Who was Provided with a Choice of Provider and Agrees with the Discharge Plan: Patient.    Resource Information Provided?: No  Discharge Location  Patient Expects to be Discharged to[de-identified] Home

## 2022-04-10 NOTE — DISCHARGE SUMMARY
Hospitalist Discharge Summary     Admit Date:  2022  4:21 PM   Name:  Alexandra Ashton   Age:  56 y.o.  :  1965   MRN:  [id number]   PCP:  JED Hamilton  Treatment Team: Attending Provider: Cheri Nix MD; Hospitalist: Cheri Nix MD; Care Manager: Fabiana Broussard    Problem List for this Hospitalization:  Hospital Problems as of 4/10/2022 Date Reviewed: 2022          Codes Class Noted - Resolved POA    Hypokalemia ICD-10-CM: E87.6  ICD-9-CM: 276.8  4/10/2022 - Present Unknown        HTN (hypertension) ICD-10-CM: I10  ICD-9-CM: 401.9  4/10/2022 - Present Unknown        Elevated blood pressure reading ICD-10-CM: R03.0  ICD-9-CM: 796.2  2022 - Present Unknown        LINDA (acute kidney [facility]) ([facility] ICD-10-CM: N17.9  ICD-9-CM: 584.9  2022 - Present Unknown        * (Principal) New onset type 2 diabetes mellitus ([facility] ICD-10-CM: E11.9  ICD-9-CM: 250.00  2022 - Present Unknown        Obesity (BMI 35.0-39.9 without comorbidity) ICD-10-CM: E66.9  ICD-9-CM: 278.00  2022 - Present Yes                Admission HPI from 2022:    Patient is a 56 y.o. male who presented to the ED for cc increased urination and fatigue for the past two weeks. Nothing seems to make better or worse. No past medical hx but does have a family hx of DM type II and HTN.    Vitals - mild tachycardia, BP elevated     Labs- Glucose 426. WBC 12.1. Createine 1.7 from baseline 1.1. Hospital Course:    New diabetes mellitus type 2  Hemoglobin A1c of 10.4  During the stay had a diabetic teaching with the diabetic management nurse. During the stay he has been on Metformin, glipizide, sliding scale insulin and Lantus. Discharged on Basaglar 20 units daily, Metformin 1 g twice daily, glipizide 5 mg twice daily, continue on sliding scale with NovoLog. Advised to be compliant with her diabetic diet.   Advised to keep a log of all the blood sugar levels and show to primary care physician at follow-up. LINDA  Continue on IV fluids, LINDA resolved, discharge creatinine 1.9, GFR greater than 60    Hypokalemia  Replaced with 40 mg of potassium chloride q. hourly x2  At discharge was given a prescription for 10 mg of potassium chloride daily for 4 days  Also gave a prescription for magnesium oxide to 40 mg daily for 4 days    Hypertension  On and off for blood pressure is in 130s to 140s. Discharged on lisinopril 10 mg p.o. daily. Advised to follow-up with primary care physician within a week. Follow up instructions below. Plan was discussed with patient and spouse. All questions answered. Patient was stable at time of discharge and was instructed to call or return if there are any concerns or recurrence of symptoms. Diagnostic Imaging/Tests:   No results found. Echocardiogram results:  No results found for this visit on 04/07/22.       All Micro Results     None          Labs: Results:       BMP, Mg, Phos Recent Labs     04/10/22  0523 04/09/22  0540 04/08/22  0645    141 142   K 3.4* 3.6 4.0    109* 109*   CO2 25 23 21   AGAP 9 9 12   BUN 15 19 20   CREA 0.90 1.10 1.20   CA 8.9 9.1 9.0   * 212* 271*      CBC Recent Labs     04/07/22  1547   WBC 12.1*   RBC 5.81*   HGB 15.0   HCT 47.3      GRANS 60   LYMPH 34   EOS 0*   MONOS 5   BASOS 0   IG 0   ANEU 7.2   ABL 4.1   AL 0.0   ABM 0.7   ABB 0.1   AIG 0.0      LFT Recent Labs     04/07/22  1547   ALT 25   AP 99   TP 9.6*   ALB 4.1   GLOB 5.5*   AGRAT 0.7*      Cardiac Testing No results found for: BNPP, BNP, CPK, RCK1, RCK2, RCK3, RCK4, CKMB, CKNDX, CKND1, TROPT, TROIQ   Coagulation Tests No results found for: PTP, INR, APTT, INREXT, INREXT   A1c Lab Results   Component Value Date/Time    Hemoglobin A1c 10.4 (H) 04/07/2022 03:47 PM      Lipid Panel Lab Results   Component Value Date/Time    Cholesterol, total 170 01/21/2022 11:18 AM    HDL Cholesterol 33 (L) 01/21/2022 11:18 AM    LDL, calculated 118 (H) 01/21/2022 11:18 AM    VLDL, calculated 19 01/21/2022 11:18 AM    Triglyceride 100 01/21/2022 11:18 AM      Thyroid Panel Lab Results   Component Value Date/Time    TSH 0.883 01/21/2022 11:18 AM        Most Recent UA No results found for: COLOR, APPRN, REFSG, JESSICA, PROTU, GLUCU, KETU, BILU, BLDU, UROU, MACARIO, LEUKU     No Known Allergies    There is no immunization history on file for this patient.     All Labs from Last 24 Hrs:  Recent Results (from the past 24 hour(s))   GLUCOSE, POC    Collection Time: 04/09/22  3:44 PM   Result Value Ref Range    Glucose (POC) 243 (H) 65 - 100 mg/dL    Performed by Janeht Bo    GLUCOSE, POC    Collection Time: 04/09/22  8:26 PM   Result Value Ref Range    Glucose (POC) 229 (H) 65 - 100 mg/dL    Per[facility] by [facility]    METABOLIC PANEL, BASIC    Collection Time: 04/10/22  5:23 AM   Result Value Ref Range    Sodium 140 136 - 145 mmol/L    Potassium 3.4 (L) 3.5 - 5.1 mmol/L    Chloride 106 98 - 107 mmol/L    CO2 25 21 - 32 mmol/L    Anion gap 9 7 - 16 mmol/L    Glucose 243 (H) 65 - 100 mg/dL    BUN 15 6 - 23 MG/DL    Creatinine 0.90 0.8 - 1.5 MG/DL    GFR est AA >60 >60 ml/min/1.73m2    GFR est non-AA >60 >60 ml/min/1.73m2    Calcium 8.9 8.3 - 10.4 MG/DL   GLUCOSE, POC    Collection Time: 04/10/22  7:44 AM   Result Value Ref Range    Glucose (POC) 264 (H) 65 - 100 mg/dL    Performed by zAiza    GLUCOSE, POC    Collection Time: 04/10/22 11:05 AM   Result Value Ref Range    Glucose (POC) 230 (H) 65 - 100 mg/dL    Performed by Aziza        Discharge Exam:  Patient Vitals for the past 24 hrs:   Temp Pulse Resp BP SpO2   04/10/22 1104 98.4 °F (36.9 °C) 74 14 136/84 100 %   04/10/22 0745 97.9 °F (36.6 °C) 76 17 122/82 99 %   04/10/22 0406 98.7 °F (37.1 °C) 83 18 117/75 97 %   04/09/22 2251 98.7 °F (37.1 °C) 97 17 (!) 149/82 99 %   04/09/22 1933 98.7 °F (37.1 °C) 86 17 138/83 99 %   04/09/22 1547 98.3 °F (36.8 °C) 88 18 132/79 --     Oxygen Therapy  O2 Sat (%): 100 % (04/10/22 1104)  Pulse via Oximetry: 92 beats per minute (04/07/22 1844)  O2 Device: None (Room air) (04/10/22 1104)  No intake or output data in the 24 hours ending 04/10/22 1309    General:    Well nourished. Alert. No distress. Eyes:   Normal sclera. Extraocular movements intact. ENT:  Normocephalic, atraumatic. Moist mucous membranes  CV:   Regular rate and rhythm. No murmur, rub, or gallop. Lungs:  Clear to auscultation bilaterally. No wheezing, rhonchi, or rales. Abdomen: Soft, nontender, nondistended. Bowel sounds normal.   Extremities: Warm and dry. No cyanosis or edema. Neurologic: CN II-XII grossly intact. Sensation intact. Skin:     No rashes or jaundice. Psych:  Normal mood and affect. Discharge Info:   Current Discharge Medication List      START taking these medications    Details   glipiZIDE (GLUCOTROL) 5 mg tablet Take 1 Tablet by mouth Before breakfast and dinner. Qty: 60 Tablet, Refills: 0  Start date: 4/10/2022      lisinopriL (PRINIVIL, ZESTRIL) 10 mg tablet Take 1 Tablet by mouth daily. Qty: 30 Tablet, Refills: 0  Start date: 4/11/2022      metFORMIN (GLUCOPHAGE) 1,000 mg tablet Take 1 Tablet by mouth two (2) times daily (with meals). Qty: 60 Tablet, Refills: 0  Start date: 4/10/2022      insulin glargine (Basaglar KwikPen U-100 Insulin) 100 unit/mL (3 mL) inpn E 11.65. 20 units sq q daily. Qty: 2 Adjustable Dose Pre-filled Pen Syringe, Refills: 1  Start date: 4/10/2022      insulin aspart U-100 (NOVOLOG) 100 unit/mL (3 mL) inpn E11.65. MAX OF 40 UNITS/DAY. CHECK GLUCOSE AC AND QHS. For Blood Sugar (mg/dL) of:   Less than 150 =   0 units  ,       150 -199 =   2 units, 200 -249 =   4 units, 250 -299 =   6 units, 300 -349 =   8 units, 350 and above = 10 units  Qty: 5 Adjustable Dose Pre-filled Pen Syringe, Refills: 1  Start date: 4/10/2022      Insulin Needles, Disposable, 32 gauge x 5/32\" ndle E11.65. CHECK GLUCOSE AC AND QHS.   Qty: 100 Pen Needle, Refills: 1  Start date: 4/10/2022      lancets misc E11.65. CHECK GLUCOSE AC AND QHS. Qty: 1 Each, Refills: 11  Start date: 4/10/2022      glucose blood VI test strips (ASCENSIA AUTODISC VI, ONE TOUCH ULTRA TEST VI) strip E 11.65. CHECK GLUCOSE AC AND QHS. Qty: 100 Strip, Refills: 1  Start date: 4/10/2022      Blood-Glucose Meter monitoring kit E11.65. CHECK GLUCOSE AC AND QHS. Qty: 1 Kit, Refills: 0  Start date: 4/10/2022      potassium chloride (KLOR-CON M10) 10 mEq tablet Take 1 Tablet by mouth daily. Qty: 4 Tablet, Refills: 0  Start date: 4/10/2022      magnesium oxide (MAG-OX) 400 mg tablet Take 1 Tablet by mouth daily. Qty: 4 Tablet, Refills: 0  Start date: 4/10/2022         STOP taking these medications       guaifenesin/pseudoephedrne HCl (MUCINEX D PO) Comments:   Reason for Stopping:         acetaminophen (TYLENOL PO) Comments:   Reason for Stopping:         predniSONE (DELTASONE) 20 mg tablet Comments:   Reason for Stopping:                 Disposition: Home  Activity: Ad aden. Diet: ADULT DIET Regular; 4 carb choices (60 gm/meal); Low Fat/Low Chol/High Fiber/2 gm Na    Follow-up Appointments   Procedures    FOLLOW UP VISIT Appointment in: One Week FOLLOW UP WITH PCP IN A WEEK WITH BMP AND MAGNESIUM. KEEP A LOG OF BLOOD SUGAR VALUES AND SHOW IT TO PCP AT FOLLOW UP. DIABETIC DIET AND LOW SODIUM DIET. FOLLOW UP WITH PCP IN A WEEK WITH BMP AND MAGNESIUM. KEEP A LOG OF BLOOD SUGAR VALUES AND SHOW IT TO PCP AT FOLLOW UP.  DIABETIC DIET AND LOW SODIUM DIET. Standing Status:   Standing     Number of Occurrences:   1     Order Specific Question:   Appointment in     Answer: One Week         Follow-up Information     Follow up With Specialties Details Why Contact Info    Valerie Fallon, [address] [facility]   [address] [place] [address]  [phone]            Time spent in patient discharge planning and coordination 35 minutes.     Signed:  Molina Castanon MD

## 2022-04-11 PROBLEM — N17.9 AKI (ACUTE KIDNEY INJURY) (HCC): Status: ACTIVE | Noted: 2022-04-07

## 2022-04-11 PROBLEM — E11.9 NEW ONSET TYPE 2 DIABETES MELLITUS (HCC): Status: ACTIVE | Noted: 2022-04-07

## 2022-04-11 PROBLEM — R03.0 ELEVATED BLOOD PRESSURE READING: Status: ACTIVE | Noted: 2022-04-07

## 2022-04-11 PROBLEM — E87.6 HYPOKALEMIA: Status: ACTIVE | Noted: 2022-04-10

## 2022-04-11 PROBLEM — I10 HTN (HYPERTENSION): Status: ACTIVE | Noted: 2022-04-10

## 2022-04-12 ENCOUNTER — TELEPHONE (OUTPATIENT)
Dept: DIABETES SERVICES | Age: 57
End: 2022-04-12

## 2022-04-12 NOTE — TELEPHONE ENCOUNTER
Referral received for diabetes education. Talked with pt and he has insurance but he couldn't find his card. Pt will call us back to give us his insurance info so we can check coverage for diabetes education.

## 2022-04-18 PROBLEM — K42.9 UMBILICAL HERNIA WITHOUT OBSTRUCTION AND WITHOUT GANGRENE: Status: ACTIVE | Noted: 2022-04-18

## 2022-04-20 PROBLEM — R03.0 ELEVATED BLOOD PRESSURE READING: Status: RESOLVED | Noted: 2022-04-07 | Resolved: 2022-04-20

## 2022-04-20 PROBLEM — R94.31 ABNORMAL EKG: Status: ACTIVE | Noted: 2022-04-20

## 2022-04-21 ENCOUNTER — TELEPHONE (OUTPATIENT)
Dept: DIABETES SERVICES | Age: 57
End: 2022-04-21

## 2022-04-21 NOTE — TELEPHONE ENCOUNTER
Call to patient to provide insurance estimate information. Provided. He said too expensive. But if a cheaper option becomes available, he would be interested.

## 2022-05-02 PROBLEM — N17.9 AKI (ACUTE KIDNEY INJURY) (HCC): Status: RESOLVED | Noted: 2022-04-07 | Resolved: 2022-05-02

## 2022-05-02 PROBLEM — E87.6 HYPOKALEMIA: Status: RESOLVED | Noted: 2022-04-10 | Resolved: 2022-05-02

## 2022-05-02 PROBLEM — E78.5 HYPERLIPIDEMIA: Status: ACTIVE | Noted: 2022-01-24

## 2022-06-02 ENCOUNTER — OFFICE VISIT (OUTPATIENT)
Dept: FAMILY MEDICINE CLINIC | Facility: CLINIC | Age: 57
End: 2022-06-02
Payer: COMMERCIAL

## 2022-06-02 VITALS
SYSTOLIC BLOOD PRESSURE: 136 MMHG | OXYGEN SATURATION: 97 % | RESPIRATION RATE: 18 BRPM | HEIGHT: 73 IN | DIASTOLIC BLOOD PRESSURE: 76 MMHG | WEIGHT: 267.4 LBS | HEART RATE: 87 BPM | BODY MASS INDEX: 35.44 KG/M2

## 2022-06-02 DIAGNOSIS — I10 HYPERTENSION, UNSPECIFIED TYPE: ICD-10-CM

## 2022-06-02 DIAGNOSIS — Z23 ENCOUNTER FOR IMMUNIZATION: ICD-10-CM

## 2022-06-02 DIAGNOSIS — I48.92 ATRIAL FLUTTER, UNSPECIFIED TYPE (HCC): ICD-10-CM

## 2022-06-02 DIAGNOSIS — E78.5 HYPERLIPIDEMIA, UNSPECIFIED HYPERLIPIDEMIA TYPE: ICD-10-CM

## 2022-06-02 DIAGNOSIS — Z12.11 SCREENING FOR COLON CANCER: ICD-10-CM

## 2022-06-02 DIAGNOSIS — E11.9 NEW ONSET TYPE 2 DIABETES MELLITUS (HCC): Primary | ICD-10-CM

## 2022-06-02 DIAGNOSIS — E66.9 OBESITY (BMI 35.0-39.9 WITHOUT COMORBIDITY): ICD-10-CM

## 2022-06-02 DIAGNOSIS — K42.9 UMBILICAL HERNIA WITHOUT OBSTRUCTION AND WITHOUT GANGRENE: ICD-10-CM

## 2022-06-02 PROBLEM — E11.65 UNCONTROLLED TYPE 2 DIABETES MELLITUS WITH HYPERGLYCEMIA (HCC): Status: RESOLVED | Noted: 2022-06-02 | Resolved: 2022-06-02

## 2022-06-02 PROBLEM — E11.65 UNCONTROLLED TYPE 2 DIABETES MELLITUS WITH HYPERGLYCEMIA (HCC): Status: ACTIVE | Noted: 2022-06-02

## 2022-06-02 PROCEDURE — 3046F HEMOGLOBIN A1C LEVEL >9.0%: CPT | Performed by: NURSE PRACTITIONER

## 2022-06-02 PROCEDURE — 99214 OFFICE O/P EST MOD 30 MIN: CPT | Performed by: NURSE PRACTITIONER

## 2022-06-02 RX ORDER — LISINOPRIL 10 MG/1
10 TABLET ORAL DAILY
Qty: 30 TABLET | Refills: 5 | Status: SHIPPED | OUTPATIENT
Start: 2022-06-02 | End: 2022-08-24 | Stop reason: SDUPTHER

## 2022-06-02 RX ORDER — ATORVASTATIN CALCIUM 20 MG/1
20 TABLET, FILM COATED ORAL DAILY
Qty: 30 TABLET | Refills: 5 | Status: SHIPPED | OUTPATIENT
Start: 2022-06-02 | End: 2022-08-24 | Stop reason: SDUPTHER

## 2022-06-02 SDOH — ECONOMIC STABILITY: FOOD INSECURITY: WITHIN THE PAST 12 MONTHS, THE FOOD YOU BOUGHT JUST DIDN'T LAST AND YOU DIDN'T HAVE MONEY TO GET MORE.: NEVER TRUE

## 2022-06-02 SDOH — ECONOMIC STABILITY: FOOD INSECURITY: WITHIN THE PAST 12 MONTHS, YOU WORRIED THAT YOUR FOOD WOULD RUN OUT BEFORE YOU GOT MONEY TO BUY MORE.: NEVER TRUE

## 2022-06-02 ASSESSMENT — ENCOUNTER SYMPTOMS
CHEST TIGHTNESS: 0
ABDOMINAL DISTENTION: 0
RHINORRHEA: 0
EYE PAIN: 0
EYES NEGATIVE: 1
SHORTNESS OF BREATH: 0
DIARRHEA: 0
VOICE CHANGE: 0
FACIAL SWELLING: 0
SINUS PAIN: 0
COUGH: 0
SORE THROAT: 0
ALLERGIC/IMMUNOLOGIC NEGATIVE: 1
RECTAL PAIN: 0
STRIDOR: 0
TROUBLE SWALLOWING: 0
BACK PAIN: 0
ABDOMINAL PAIN: 0
VOMITING: 0
SINUS PRESSURE: 0
ANAL BLEEDING: 0
RESPIRATORY NEGATIVE: 1
EYE DISCHARGE: 0
CONSTIPATION: 0
WHEEZING: 0
NAUSEA: 0
BLOOD IN STOOL: 0

## 2022-06-02 ASSESSMENT — PATIENT HEALTH QUESTIONNAIRE - PHQ9
SUM OF ALL RESPONSES TO PHQ9 QUESTIONS 1 & 2: 0
SUM OF ALL RESPONSES TO PHQ QUESTIONS 1-9: 0
2. FEELING DOWN, DEPRESSED OR HOPELESS: 0
1. LITTLE INTEREST OR PLEASURE IN DOING THINGS: 0
SUM OF ALL RESPONSES TO PHQ QUESTIONS 1-9: 0

## 2022-06-02 ASSESSMENT — SOCIAL DETERMINANTS OF HEALTH (SDOH): HOW HARD IS IT FOR YOU TO PAY FOR THE VERY BASICS LIKE FOOD, HOUSING, MEDICAL CARE, AND HEATING?: NOT HARD AT ALL

## 2022-06-02 NOTE — LETTER
[address] [facility] [address]  Phone: [phone]  Fax: [phone]    LUNA Sloan NP        June 2, 2022     Patient: Alexi Wells   YOB: 1965   Date of Visit: 6/2/2022       To Whom It May Concern: It is my medical opinion that Alexi Wells may return to work on Nakita 3, 2022. His diabetes is now being completely managed by oral medications and he is no longer on injectable insulin. If you have any questions or concerns, please don't hesitate to call.     Sincerely,        LUNA Sloan NP

## 2022-06-02 NOTE — PROGRESS NOTES
[address] [facility] [address] [address] [facility]  Phone: [phone] Fax [phone]  Jeffrey Rowley Dwayne MS, APRN, FNP-C  6/2/2022   Chief Complaint   Patient presents with    Follow-up     Pt here today to recheck DM2, HTN, HLD. Pt states that his fasting FSBS has been running  range since last appt. Pt reports taking Glipizide, Metformin, Basaglar, Lisinopril, and Lipitor and is following heart healthy/diabetic/DASH diet and excercise plan as directed. Pt states that he has not had to take the Novolog SSI since last appt as his FSBS has not been above 149. Pt had a few low readings of 50 and 57 on a few occasions mid day but denies any symptoms of hypoglycemia. ASSESSMENT/PLAN:  Below is the assessment and plan developed based on review of pertinent history, physical exam, labs, studies, and medications. 1. New onset type 2 diabetes mellitus ([facility] [address]  Pt states that his fasting FSBS has been running  range since last appt. Pt reports taking Glipizide, Metformin, Basaglar and is following heart healthy/diabetic/DASH diet and excercise plan as directed. Pt states that he has not had to take the Novolog SSI since last appt as his FSBS has not been above 149. Pt had a few low readings of 50 and 57 on a few occasions mid day but denies any symptoms of hypoglycemia. Pt reports having diabetic eye exam yesterday, 6/1/22 with [facility]. UTD on urine microalbumin and diabetic foot exam 5/2/22. Discussed with pt. A1C was 10.2% on 5/2/22, but fasting FSBS has continued to trend in right direction. Pt had a few asymptomatic lows. Will d/c Novolog SSI, Basaglar, and Glipizide. Will continue current dose of Metformin-refill given and add Januvia 100 mg po daily. Will have pt continue heart healthy/diabetic/DASH diet and excercise plan as previously directed. Will have pt continue to check FSBS as before and report any readings below 60 and above 200.  Pt to keep next scheduled f/u with me 7/28/22. Will recheck CMP and A1C prior to that appointment. With pt's FSBS trends continuing to improve and since I d/c'd pt's insulin and he is now just on oral medications, letter written clearing pt to go back to work as , but pt agrees to let me know right away should he have any FSBS volatility given his medication changes. If A1C is not add goal at next f/u, will consider adding another oral agent. Will monitor.       -     SITagliptin (JANUVIA) 100 MG tablet; Take 1 tablet by mouth daily, Disp-30 tablet, R-5Normal  -     metFORMIN (GLUCOPHAGE) 1000 MG tablet; Take 1 tablet by mouth 2 times daily (with meals), Disp-60 tablet, R-5Normal  -     Comprehensive Metabolic Panel; Future  -     Hemoglobin A1C; Future    2. Hypertension, unspecified type  Pt's BP WNL today 136/76 (goal <140/90). Will continue pt on Lisinopril 10 mg po daily-refill given. Reinforced heart healthy/diabetic/DASH diet and exercise. Pt to keep next scheduled f/u with me 7/28/22. Will recheck CMP prior to that appointment. Will monitor.   -     lisinopril (PRINIVIL;ZESTRIL) 10 MG tablet; Take 1 tablet by mouth daily, Disp-30 tablet, R-5Normal  -     Comprehensive Metabolic Panel; Future    3. Hyperlipidemia, unspecified hyperlipidemia type  Pt reports taking Lipitor as directed and is following heart healthy/diabetic/DASH diet and excercise plan as directed. Will have pt continue current dose of Lipitor-refill given and diet and exercise plan as before. Pt to keep next scheduled f/u with me 7/28/22. Will recheck fasting lipids prior to that appointment. Will monitor.   -     atorvastatin (LIPITOR) 20 MG tablet; Take 1 tablet by mouth daily, Disp-30 tablet, R-5Normal  -     Lipid Panel; Future    4. Atrial flutter, unspecified type ([facility] [address]  Pt had suspected atrial flutter on EKG in office prior to admission on 4/7/22.  Had resolved once he got to hospital. Denies any chest pain, palpitations, dizziness, or SOB since being released from [facility] 22. Felt not actual atrial flutter on 22. Had NSR on EKG at Cardiology appointment. No HM ordered. Had echo scheduled for 22 due to HTN/DM2-but pt missed this appointment. Pt agrees to call and reschedule once he has the money as he has been out of work.  Normal pulse and BP today. Pt encouraged to follow POC as laid out by [address] Card[facility]. Pt to keep next scheduled f/u with me 22. Will monitor. 5. Obesity (BMI 35.0-39.9 without comorbidity)  Continue to recommend heart healthy/diaebtic/DASH diet and exercise.       6. Umbilical hernia without obstruction and without gangrene  Small, nontender, reducible umbilical hernia unchanged on physical exam. Pt asymptomatic. Pt was previously referred to General Surgery for further evaluation. Pt had not heard from them. Pt was previously given number to call to make an appointment. He has not done so yet. Pt agrees to call to make an appointment when he has the money as he has been out of work. Pt to keep next scheduled f/u with me 22. Will monitor. 7. Screening for colon cancer  Pt was previously referred for colonoscopy. Pt had not heard from them. Pt was previously given number to call to make an appointment. He has not done so yet. Pt agrees to call to make an appointment when he has the money as he has been out of work. 8. Encounter for immunization  Pt due of Shingrix and Covid vax. Will continue to encourage pt to get UTD on vaccines at future appointments. Will discuss drawing PSA at next appointment. Return in 8 weeks (on 2022) for Keep f/u as scheduled. Have fasting labs drawn prior. Call sooner for concerns. SUBJECTIVE/OBJECTIVE:    HPI 22-  Catarina Goodell (: 1965) is a 56 y.o. male, established patient, here for evaluation of the following chief complaint(s):  Follow-up (Pt here today to recheck DM2, HTN, HLD.  Pt states that his fasting FSBS has been running 105-143 range since last appt. Was 113 today. Reports evening readings  range since last appt. Was 113 yesterday. Pt reports taking Glipizide, Metformin, Lisinopril, and Lipitor and is following heart healthy/diabetic/DASH diet and excercise plan as directed. Pt states that he has not taken the Basaglar or Novolog SSI since last appt as his FSBS has not been above 149. Pt denies any readings below 60. )   Pt reports feeling well today and has no concerns or complaints. HPI today-  Marlene Oakley (: 1965) is a 56 y.o. male, Established patient patient, here for evaluation of the following chief complaint(s):  Follow-up (Pt here today to recheck DM2, HTN, HLD. Pt states that his fasting FSBS has been running  range since last appt. Pt reports taking Glipizide, Metformin, Basaglar, Lisinopril, and Lipitor and is following heart healthy/diabetic/DASH diet and excercise plan as directed. Pt states that he has not had to take the Novolog SSI since last appt as his FSBS has not been above 149. Pt had a few low readings of 50 and 57 on a few occasions mid day but denies any symptoms of hypoglycemia. )    Pt reports feeling well today and has no concerns or complaints. Pt reports having diabetic eye exam yesterday, 22 with [facility]. UTD on urine microalbumin and diabetic foot exam 22. No Known Allergies  [unfilled]  Past Medical History:   Diagnosis Date    Diabetes ([address]     Essential hypertension     Hyperlipidemia      History reviewed. No pertinent surgical history.   Family History   Problem Relation Age of Onset    Hypertension Mother     Diabetes Mother     Hypertension Paternal Grandfather     Diabetes Paternal Grandfather     Hypertension Paternal Grandmother     Diabetes Paternal Grandmother     Hypertension Maternal Grandfather     Diabetes Maternal Grandfather     Hypertension Maternal Grandmother     Diabetes Maternal Grandmother     Hypertension Father     Diabetes Father      Social History     Tobacco Use   Smoking Status Never Smoker   Smokeless Tobacco Never Used         Review of Systems   Constitutional: Negative. Negative for appetite change, chills, diaphoresis, fatigue, fever and unexpected weight change. HENT: Negative. Negative for congestion, ear discharge, ear pain, facial swelling, hearing loss, mouth sores, nosebleeds, postnasal drip, rhinorrhea, sinus pressure, sinus pain, sneezing, sore throat, tinnitus, trouble swallowing and voice change. Eyes: Negative. Negative for pain, discharge and visual disturbance. Respiratory: Negative. Negative for cough, chest tightness, shortness of breath, wheezing and stridor. Cardiovascular: Negative. Negative for chest pain, palpitations and leg swelling. Gastrointestinal: Negative for abdominal distention, abdominal pain, anal bleeding, blood in stool, constipation, diarrhea, nausea, rectal pain and vomiting. Small nontender reducible umbilical hernia-unchanged since last appt per pt. Asymptomatic   Endocrine: Negative. Negative for cold intolerance, heat intolerance, polydipsia, polyphagia and polyuria. Genitourinary: Negative. Negative for decreased urine volume, difficulty urinating, dysuria, flank pain, frequency, genital sores, hematuria, penile discharge, penile pain, penile swelling, scrotal swelling, testicular pain and urgency. Musculoskeletal: Negative. Negative for arthralgias, back pain, gait problem, joint swelling, myalgias, neck pain and neck stiffness. Skin: Negative. Negative for pallor and rash. Allergic/Immunologic: Negative. Negative for environmental allergies. Neurological: Negative. Negative for dizziness, tremors, syncope, weakness, light-headedness, numbness and headaches. Hematological: Negative. Negative for adenopathy. Does not bruise/bleed easily. Psychiatric/Behavioral: Negative.   Negative for dysphoric mood, hallucinations, self-injury, sleep disturbance and suicidal ideas. The patient is not nervous/anxious. Vitals:    06/02/22 0857   BP: 136/76   Pulse: 87   Resp: 18   SpO2: 97%       Physical Exam  Vitals reviewed. Constitutional:       General: He is not in acute distress. Appearance: Normal appearance. He is obese. He is not ill-appearing, toxic-appearing or diaphoretic. HENT:      Head: Normocephalic and atraumatic. Right Ear: Tympanic membrane, ear canal and external ear normal. There is no impacted cerumen. Left Ear: Tympanic membrane, ear canal and external ear normal. There is no impacted cerumen. Nose: Nose normal. No congestion or rhinorrhea. Mouth/Throat:      Mouth: Mucous membranes are moist.      Pharynx: Oropharynx is clear. No oropharyngeal exudate or posterior oropharyngeal erythema. Eyes:      General: No scleral icterus. Right eye: No discharge. Left eye: No discharge. Extraocular Movements: Extraocular movements intact. Conjunctiva/sclera: Conjunctivae normal.      Pupils: Pupils are equal, round, and reactive to light. Cardiovascular:      Rate and Rhythm: Normal rate and regular rhythm. Pulses: Normal pulses. Heart sounds: Normal heart sounds. No murmur heard. No friction rub. No gallop. Pulmonary:      Effort: Pulmonary effort is normal. No respiratory distress. Breath sounds: Normal breath sounds. No stridor. No wheezing, rhonchi or rales. Chest:      Chest wall: No tenderness. Abdominal:      General: Abdomen is flat. Bowel sounds are normal. There is no distension. Palpations: Abdomen is soft. There is no mass. Tenderness: There is no abdominal tenderness. There is no right CVA tenderness, left CVA tenderness, guarding or rebound. Hernia: A hernia (small nontender reducible umbilical hernia-unchanged since last appt. Asymptomatic) is present.    Musculoskeletal:         General: Normal range of motion. Cervical back: Normal range of motion and neck supple. No rigidity or tenderness. Comments: Gait steady and unassisted   Lymphadenopathy:      Cervical: No cervical adenopathy. Skin:     General: Skin is warm. Coloration: Skin is not jaundiced or pale. Findings: No bruising or rash. Neurological:      General: No focal deficit present. Mental Status: He is alert and oriented to person, place, and time. Cranial Nerves: No cranial nerve deficit. Sensory: No sensory deficit. Motor: No weakness. Coordination: Coordination normal.      Gait: Gait normal.   Psychiatric:         Mood and Affect: Mood normal.         Behavior: Behavior normal.         Thought Content:  Thought content normal.         Judgment: Judgment normal.       Component      Latest Ref Rng & Units 5/2/2022 4/7/2022          10:12 AM  3:47 PM   Hemoglobin A1C      4.8 - 5.6 % 10.2 (H) 10.4 (H)   eAG (mg/dL)      mg/dL 246 252     Component      Latest Ref Rng & Units 4/18/2022 4/9/2022           9:43 AM  5:40 AM   GLUCOSE, FASTING,GF      65 - 99 mg/dL 145 (H) 212 (H)   BUN,BUNPL      6 - 24 mg/dL 14 19   Creatinine      0.76 - 1.27 mg/dL 0.89 1.10   EGFR      >59 mL/min/1.73 101    Bun/Cre Ratio      9 - 20 NA 16    Sodium      134 - 144 mmol/L 138 141   Potassium      3.5 - 5.2 mmol/L 4.4 3.6   Chloride      96 - 106 mmol/L 100 109 (H)   CO2      20 - 29 mmol/L 22 23   CALCIUM, SERUM, [id number]      8.7 - 10.2 mg/dL 9.5 9.1   Total Protein      6.0 - 8.5 g/dL 7.4    Albumin      3.8 - 4.9 g/dL 4.4    Globulin, Total      1.5 - 4.5 g/dL 3.0    Albumin/Globulin Ratio      1.2 - 2.2 NA 1.5    Bilirubin      0.0 - 1.2 mg/dL 0.2    Alk Phos      44 - 121 IU/L 60    AST      0 - 40 IU/L 27    ALT      0 - 44 IU/L 35    Anion Gap      7 - 16 mmol/L  9   GFR African American      >60 ml/min/1.73m2  >60   EGFR IF NonAfrican American      >60 ml/min/1.73m2  >60     Component      Latest Ref Rng & Units 4/18/2022           9:43 AM   CHOLESTEROL, TOTAL, [id number]      100 - 199 mg/dL 194   Triglycerides      0 - 149 mg/dL 97   HDL Cholesterol      >39 mg/dL 46   VLDL      5 - 40 mg/dL 18   LDL Calculated      0 - 99 mg/dL 130 (H)     Component      Latest Ref Rng & Units 5/2/2022          11:19 AM   Microalbumin urine, POC      MG/L negative   Microalb/Creat Ratio POC      <30 MG/G <30     Component      Latest Ref Rng & Units 1/21/2022          11:18 AM   TSH      0.450 - 4.500 uIU/mL 0.883     PHQ-9 Total Score: 0 (6/2/2022  9:03 AM)        PLEASE NOTE:  This document has been produced using voice recognition software. Unrecognized errors in transcription may be present. On this date 6/2/2022 I have spent 30 minutes reviewing previous notes, test results and face to face with the patient discussing the diagnosis and importance of compliance with the treatment plan as well as documenting on the day of the visit. An electronic signature was used to authenticate this note.   -- LUNA Jacobson - NP

## 2022-06-02 NOTE — PATIENT INSTRUCTIONS
Patient Education   Patient Education      Patient Education        Heart-Healthy Diet: Care Instructions  Your Care Instructions     A heart-healthy diet has lots of vegetables, fruits, nuts, beans, and whole grains, and is low in salt. It limits foods that are high in saturated fat, such as meats, cheeses, and fried foods. It may be hard to change your diet,but even small changes can lower your risk of heart attack and heart disease. Follow-up care is a key part of your treatment and safety. Be sure to make and go to all appointments, and call your doctor if you are having problems. It's also a good idea to know your test results and keep alist of the medicines you take. How can you care for yourself at home? Watch your portions   Use food labels to learn what the recommended servings are for the foods you eat.  Eat only the number of calories you need to stay at a healthy weight. If you need to lose weight, eat fewer calories than your body burns (through exercise and other physical activity). Eat more fruits and vegetables   Eat a variety of fruit and vegetables every day. Dark green, deep orange, red, or yellow fruits and vegetables are especially good for you. Examples include spinach, carrots, peaches, and berries.  Keep carrots, celery, and other veggies handy for snacks. Buy fruit that is in season and store it where you can see it so that you will be tempted to eat it.  Cook dishes that have a lot of veggies in them, such as stir-fries and soups. Limit saturated fat   Read food labels, and try to avoid saturated fats. They increase your risk of heart disease.  Use olive or canola oil when you cook.  Bake, broil, grill, or steam foods instead of frying them.  Choose lean meats instead of high-fat meats such as hot dogs and sausages. Cut off all visible fat when you prepare meat.  Eat fish, skinless poultry, and meat alternatives such as soy products instead of high-fat meats.  Soy products, such as tofu, may be especially good for your heart.  Choose low-fat or fat-free milk and dairy products. Eat foods high in fiber   Eat a variety of grain products every day. Include whole-grain foods that have lots of fiber and nutrients. Examples of whole-grain foods include oats, whole wheat bread, and brown rice.  Buy whole-grain breads and cereals, instead of white bread or pastries. Limit salt and sodium   Limit how much salt and sodium you eat to help lower your blood pressure.  Taste food before you salt it. Add only a little salt when you think you need it. With time, your taste buds will adjust to less salt.  Eat fewer snack items, fast foods, and other high-salt, processed foods. Check food labels for the amount of sodium in packaged foods.  Choose low-sodium versions of canned goods (such as soups, vegetables, and beans). Limit sugar   Limit drinks and foods with added sugar. These include candy, desserts, and soda pop. Limit alcohol   Limit alcohol to no more than 2 drinks a day for men and 1 drink a day for women. Too much alcohol can cause health problems. When should you call for help? Watch closely for changes in your health, and be sure to contact your doctor if:     You would like help planning heart-healthy meals. Where can you learn more? Go to https://CactuspeEcrebo.healthAsanti. org and sign in to your [employer] account. Enter V137 in the [facility] box to learn more about \"Heart-Healthy Diet: Care Instructions. \"     If you do not have an account, please click on the \"Sign Up Now\" link. Current as of: September 8, 2021               Content Version: 13.2  © 9735-2288 Healthwise, Incorporated. Care instructions adapted under license by [facility] ([facility]).  If you have questions about a medical condition or this instruction, always ask your healthcare professional. Macey Sawyer any warranty or liability for your use of this information. DASH Diet: Care Instructions  Your Care Instructions     The DASH diet is an eating plan that can help lower your blood pressure. DASH stands for Dietary Approaches to Stop Hypertension. Hypertension is high bloodpressure. The DASH diet focuses on eating foods that are high in calcium, potassium, and magnesium. These nutrients can lower blood pressure. The foods that are highest in these nutrients are fruits, vegetables, low-fat dairy products, nuts, seeds, and legumes. But taking calcium, potassium, and magnesium supplements instead of eating foods that are high in those nutrients does not have the same effect. The DASH diet also includes whole grains, fish, and poultry. The DASH diet is one of several lifestyle changes your doctor may recommend to lower your high blood pressure. Your doctor may also want you to decrease the amount of sodium in your diet. Lowering sodium while following the DASH dietcan lower blood pressure even further than just the DASH diet alone. Follow-up care is a key part of your treatment and safety. Be sure to make and go to all appointments, and call your doctor if you are having problems. It's also a good idea to know your test results and keep alist of the medicines you take. How can you care for yourself at home? Following the DASH diet   Eat 4 to 5 servings of fruit each day. A serving is 1 medium-sized piece of fruit, ½ cup chopped or canned fruit, 1/4 cup dried fruit, or 4 ounces (½ cup) of fruit juice. Choose fruit more often than fruit juice.  Eat 4 to 5 servings of vegetables each day. A serving is 1 cup of lettuce or raw leafy vegetables, ½ cup of chopped or cooked vegetables, or 4 ounces (½ cup) of vegetable juice. Choose vegetables more often than vegetable juice.  Get 2 to 3 servings of low-fat and fat-free dairy each day. A serving is 8 ounces of milk, 1 cup of yogurt, or 1 ½ ounces of cheese.  Eat 6 to 8 servings of grains each day.  A serving is 1 slice of bread, 1 ounce of dry cereal, or ½ cup of cooked rice, pasta, or cooked cereal. Try to choose whole-grain products as much as possible.  Limit lean meat, poultry, and fish to 2 servings each day. A serving is 3 ounces, about the size of a deck of cards.  Eat 4 to 5 servings of nuts, seeds, and legumes (cooked dried beans, lentils, and split peas) each week. A serving is 1/3 cup of nuts, 2 tablespoons of seeds, or ½ cup of cooked beans or peas.  Limit fats and oils to 2 to 3 servings each day. A serving is 1 teaspoon of vegetable oil or 2 tablespoons of salad dressing.  Limit sweets and added sugars to 5 servings or less a week. A serving is 1 tablespoon jelly or jam, ½ cup sorbet, or 1 cup of lemonade.  Eat less than 2,300 milligrams (mg) of sodium a day. If you limit your sodium to 1,500 mg a day, you can lower your blood pressure even more.  Be aware that all of these are the suggested number of servings for people who eat 1,800 to 2,000 calories a day. Your recommended number of servings may be different if you need more or fewer calories. Tips for success   Start small. Do not try to make dramatic changes to your diet all at once. You might feel that you are missing out on your favorite foods and then be more likely to not follow the plan. Make small changes, and stick with them. Once those changes become habit, add a few more changes.  Try some of the following:  ? Make it a goal to eat a fruit or vegetable at every meal and at snacks. This will make it easy to get the recommended amount of fruits and vegetables each day. ? Try yogurt topped with fruit and nuts for a snack or healthy dessert. ? Add lettuce, tomato, cucumber, and onion to sandwiches. ? Combine a ready-made pizza crust with low-fat mozzarella cheese and lots of vegetable toppings. Try using tomatoes, squash, spinach, broccoli, carrots, cauliflower, and onions. ?  Have a variety of cut-up vegetables with a low-fat dip as an appetizer instead of chips and dip. ? Sprinkle sunflower seeds or chopped almonds over salads. Or try adding chopped walnuts or almonds to cooked vegetables. ? Try some vegetarian meals using beans and peas. Add garbanzo or kidney beans to salads. Make burritos and tacos with mashed ricci beans or black beans. Where can you learn more? Go to https://Doctor kineticpepicMojave Networks.Lumafit. org and sign in to your [employer] account. Enter N131 in the [employer] box to learn more about \"DASH Diet: Care Instructions. \"     If you do not have an account, please click on the \"Sign Up Now\" link. Current as of: January 10, 2022               Content Version: 13.2  © 2006-2022 [employer]. Care instructions adapted under license by [facility] ([facility]). If you have questions about a medical condition or this instruction, always ask your healthcare professional. [place] [address] any warranty or liability for your use of this information. Learning About Meal Planning for Diabetes  Why plan your meals? Meal planning can be a key part of managing diabetes. Planning meals and snacks with the right balance of carbohydrate, protein, and fat can help you keep yourblood sugar at the target level you set with your doctor. You don't have to eat special foods. You can eat what your family eats, including sweets once in a while. But you do have to pay attention to how oftenyou eat and how much you eat of certain foods. You may want to work with a dietitian or a diabetes educator. They can give you tips and meal ideas and can answer your questions about meal planning. This health professional can also help you reach a healthy weight if that is one ofyour goals. What plan is right for you? Your dietitian or diabetes educator may suggest that you start with the plateformat or carbohydrate counting. The plate format  The plate format is a simple way to help you manage how you eat. You plan meals by learning how much space each food should take on a plate. Using the plate format helps you manage the amount of carbohydrate you eat. It can make it easier to keep your blood sugar level within your target range. It also helpsyou see if you're eating healthy portion sizes. To use the plate format, you put non-starchy vegetables on half your plate. Add lean protein foods, such as fish, lean meats and poultry, or soy products, on one-quarter of the plate. Put a grain or starchy vegetable (such as brown rice or a potato) on the final quarter of the plate. You can add a small piece of fruit and some low-fat or fat-free milk or yogurt, depending on yourcarbohydrate goal for each meal.  Here are some tips for using the plate format:   Make sure that you are not using an oversized plate. A 9-inch plate is best. Many restaurants use larger plates.  Get used to using the plate format at home. Then you can use it when you eat out.  Write down your questions about using the plate format. Talk to your doctor, a dietitian, or a diabetes educator about your concerns. Carbohydrate counting  With carbohydrate counting, you plan meals based on the amount of carbohydrate in each food. Carbohydrate raises blood sugar higher and more quickly than any other nutrient. It is found in desserts, breads and cereals, and fruit. It's also found in starchy vegetables such as potatoes and corn, grains such as rice and pasta, and milk and yogurt. You can help keep your blood sugar levels within your target range by planning how much carbohydrate to have at meals andsnacks. The amount you need depends on several things. These include your weight, how active you are, which diabetes medicines you take, and what your goals are for your blood sugar levels. A registered dietitian or diabetes educator can helpyou plan how much carbohydrate to include in each meal and snack.   An example of a carbohydrate counting plan is:   45 to 60 grams at each meal. That's about the same as 3 to 4 carbohydrate servings.  15 to 20 grams at each snack. That's about the same as 1 carbohydrate serving. The Nutrition Facts label on packaged foods tells you how much carbohydrate is in a serving of the food. First, look at the serving size on the food label. Is that the amount you eat in a serving? All of the nutrition information on a food label is based on that serving size. So if you eat more or less than that, you'll need to adjust the other numbers. Total carbohydrate is the next thing you need to look for on the label. If you count carbohydrate servings, oneserving of carbohydrate is 15 grams. For foods that don't come with labels, such as fresh fruits and vegetables, you'll need a guide that lists carbohydrate in these foods. Ask your doctor, dietitian, or diabetes educator about books or other nutrition guides you canuse. If you take insulin, you need to know how many grams of carbohydrate are in a meal. This lets you know how much rapid-acting insulin to take before you eat. If you use an insulin pump, you get a constant rate of insulin during the day. So the pump must be programmed at meals to give you extra insulin to cover therise in blood sugar after meals. When you know how much carbohydrate you will eat, you can take the right amount of insulin. Or, if you always use the same amount of insulin, you need to [facility] that you eat the same amount of carbohydrate at meals. If you need more help to understand carbohydrate counting and food labels, askyour doctor, dietitian, or diabetes educator. How can you plan healthy meals? Here are some tips to get started:  [facility] your meals a week at a time. Don't forget to include snacks too.  Use cookbooks or online recipes to plan several main meals. Plan some quick meals for busy nights. You also can double some recipes that freeze well.  Then you can save half for other busy nights when you don't have time to cook.  Make sure you have the ingredients you need for your recipes. If you're running low on basic items, put these items on your shopping list too.  List foods that you use to make breakfasts, lunches, and snacks. List plenty of fruits and vegetables.  Post this list on the refrigerator. Add to it as you think of more things you need.  Take the list to the store to do your weekly shopping. Follow-up care is a key part of your treatment and safety. Be sure to make and go to all appointments, and call your doctor if you are having problems. It's also a good idea to know your test results and keep alist of the medicines you take. Where can you learn more? Go to https://TipTap.WalkHub. org and sign in to your [employer] account. Enter P157 in the [employer] box to learn more about \"Learning About Meal Planning for Diabetes. \"     If you do not have an account, please click on the \"Sign Up Now\" link. Current as of: September 8, 2021               Content Version: 13.2  © 6617-7493 [employer]. Care instructions adapted under license by [facility] ([facility]). If you have questions about a medical condition or this instruction, always ask your healthcare professional. [address] any warranty or liability for your use of this information.

## 2022-06-21 DIAGNOSIS — I10 HYPERTENSION, UNSPECIFIED TYPE: ICD-10-CM

## 2022-06-21 DIAGNOSIS — E78.5 HYPERLIPIDEMIA, UNSPECIFIED HYPERLIPIDEMIA TYPE: ICD-10-CM

## 2022-06-21 DIAGNOSIS — E11.9 NEW ONSET TYPE 2 DIABETES MELLITUS (HCC): ICD-10-CM

## 2022-06-21 LAB
ALBUMIN SERPL-MCNC: 4.1 G/DL (ref 3.5–5)
ALBUMIN/GLOB SERPL: 1 {RATIO} (ref 1.2–3.5)
ALP SERPL-CCNC: 67 U/L (ref 50–136)
ALT SERPL-CCNC: 20 U/L (ref 12–65)
ANION GAP SERPL CALC-SCNC: 7 MMOL/L (ref 7–16)
AST SERPL-CCNC: 15 U/L (ref 15–37)
BILIRUB SERPL-MCNC: 0.5 MG/DL (ref 0.2–1.1)
BUN SERPL-MCNC: 10 MG/DL (ref 6–23)
CALCIUM SERPL-MCNC: 9.7 MG/DL (ref 8.3–10.4)
CHLORIDE SERPL-SCNC: 104 MMOL/L (ref 98–107)
CHOLEST SERPL-MCNC: 143 MG/DL
CO2 SERPL-SCNC: 29 MMOL/L (ref 21–32)
CREAT SERPL-MCNC: 1.1 MG/DL (ref 0.8–1.5)
GLOBULIN SER CALC-MCNC: 4 G/DL (ref 2.3–3.5)
GLUCOSE SERPL-MCNC: 93 MG/DL (ref 65–100)
HDLC SERPL-MCNC: 44 MG/DL (ref 40–60)
HDLC SERPL: 3.3 {RATIO}
LDLC SERPL CALC-MCNC: 77.4 MG/DL
POTASSIUM SERPL-SCNC: 4 MMOL/L (ref 3.5–5.1)
PROT SERPL-MCNC: 8.1 G/DL (ref 6.3–8.2)
SODIUM SERPL-SCNC: 140 MMOL/L (ref 138–145)
TRIGL SERPL-MCNC: 108 MG/DL (ref 35–150)
VLDLC SERPL CALC-MCNC: 21.6 MG/DL (ref 6–23)

## 2022-06-22 LAB
EST. AVERAGE GLUCOSE BLD GHB EST-MCNC: 151 MG/DL
HBA1C MFR BLD: 6.9 % (ref 4.2–6.3)

## 2022-08-24 ENCOUNTER — OFFICE VISIT (OUTPATIENT)
Dept: FAMILY MEDICINE CLINIC | Facility: CLINIC | Age: 57
End: 2022-08-24
Payer: COMMERCIAL

## 2022-08-24 VITALS
HEART RATE: 87 BPM | WEIGHT: 254 LBS | BODY MASS INDEX: 34.45 KG/M2 | OXYGEN SATURATION: 99 % | SYSTOLIC BLOOD PRESSURE: 132 MMHG | DIASTOLIC BLOOD PRESSURE: 78 MMHG

## 2022-08-24 DIAGNOSIS — Z23 ENCOUNTER FOR IMMUNIZATION: ICD-10-CM

## 2022-08-24 DIAGNOSIS — Z12.5 SPECIAL SCREENING FOR MALIGNANT NEOPLASM OF PROSTATE: ICD-10-CM

## 2022-08-24 DIAGNOSIS — E11.9 TYPE 2 DIABETES MELLITUS WITHOUT COMPLICATION, WITHOUT LONG-TERM CURRENT USE OF INSULIN (HCC): Primary | ICD-10-CM

## 2022-08-24 DIAGNOSIS — I10 PRIMARY HYPERTENSION: ICD-10-CM

## 2022-08-24 DIAGNOSIS — K42.9 UMBILICAL HERNIA WITHOUT OBSTRUCTION AND WITHOUT GANGRENE: ICD-10-CM

## 2022-08-24 DIAGNOSIS — Z12.11 SCREENING FOR COLON CANCER: ICD-10-CM

## 2022-08-24 DIAGNOSIS — I48.92 ATRIAL FLUTTER, UNSPECIFIED TYPE (HCC): ICD-10-CM

## 2022-08-24 DIAGNOSIS — E78.00 PURE HYPERCHOLESTEROLEMIA: ICD-10-CM

## 2022-08-24 LAB
ALBUMIN SERPL-MCNC: 3.5 G/DL (ref 3.5–5)
ALBUMIN/GLOB SERPL: 0.9 {RATIO} (ref 1.2–3.5)
ALP SERPL-CCNC: 60 U/L (ref 50–136)
ALT SERPL-CCNC: 23 U/L (ref 12–65)
ANION GAP SERPL CALC-SCNC: 2 MMOL/L (ref 7–16)
AST SERPL-CCNC: 12 U/L (ref 15–37)
BILIRUB SERPL-MCNC: 0.3 MG/DL (ref 0.2–1.1)
BUN SERPL-MCNC: 12 MG/DL (ref 6–23)
CALCIUM SERPL-MCNC: 9.2 MG/DL (ref 8.3–10.4)
CHLORIDE SERPL-SCNC: 110 MMOL/L (ref 98–107)
CHOLEST SERPL-MCNC: 171 MG/DL
CO2 SERPL-SCNC: 29 MMOL/L (ref 21–32)
CREAT SERPL-MCNC: 1.1 MG/DL (ref 0.8–1.5)
GLOBULIN SER CALC-MCNC: 3.9 G/DL (ref 2.3–3.5)
GLUCOSE SERPL-MCNC: 109 MG/DL (ref 65–100)
HDLC SERPL-MCNC: 50 MG/DL (ref 40–60)
HDLC SERPL: 3.4 {RATIO}
LDLC SERPL CALC-MCNC: 111.6 MG/DL
POTASSIUM SERPL-SCNC: 4.3 MMOL/L (ref 3.5–5.1)
PROT SERPL-MCNC: 7.4 G/DL (ref 6.3–8.2)
PSA SERPL-MCNC: 0.7 NG/ML
SODIUM SERPL-SCNC: 141 MMOL/L (ref 138–145)
TRIGL SERPL-MCNC: 47 MG/DL (ref 35–150)
VLDLC SERPL CALC-MCNC: 9.4 MG/DL (ref 6–23)

## 2022-08-24 PROCEDURE — 99214 OFFICE O/P EST MOD 30 MIN: CPT | Performed by: NURSE PRACTITIONER

## 2022-08-24 PROCEDURE — 3044F HG A1C LEVEL LT 7.0%: CPT | Performed by: NURSE PRACTITIONER

## 2022-08-24 RX ORDER — ATORVASTATIN CALCIUM 20 MG/1
20 TABLET, FILM COATED ORAL DAILY
Qty: 30 TABLET | Refills: 5 | Status: SHIPPED | OUTPATIENT
Start: 2022-08-24 | End: 2022-08-25 | Stop reason: SDUPTHER

## 2022-08-24 RX ORDER — LISINOPRIL 10 MG/1
10 TABLET ORAL DAILY
Qty: 30 TABLET | Refills: 5 | Status: SHIPPED | OUTPATIENT
Start: 2022-08-24

## 2022-08-24 ASSESSMENT — ENCOUNTER SYMPTOMS
BLOOD IN STOOL: 0
NAUSEA: 0
EYE PAIN: 0
COLOR CHANGE: 0
SINUS PRESSURE: 0
FACIAL SWELLING: 0
STRIDOR: 0
CHEST TIGHTNESS: 0
EYE DISCHARGE: 0
ALLERGIC/IMMUNOLOGIC NEGATIVE: 1
BACK PAIN: 0
EYES NEGATIVE: 1
RHINORRHEA: 0
ANAL BLEEDING: 0
ABDOMINAL PAIN: 0
TROUBLE SWALLOWING: 0
DIARRHEA: 0
SINUS PAIN: 0
CONSTIPATION: 0
SORE THROAT: 0
RESPIRATORY NEGATIVE: 1
VOICE CHANGE: 0
SHORTNESS OF BREATH: 0
RECTAL PAIN: 0
ABDOMINAL DISTENTION: 0
VOMITING: 0
COUGH: 0
WHEEZING: 0

## 2022-08-24 NOTE — PROGRESS NOTES
[address], [address] [place] [address]  Phone: [phone] Fax [phone]  Cinthia Rice MS, APRN, FNP-C  8/24/2022   Chief Complaint   Patient presents with    Follow-up     Pt here today to recheck DM2, HTN, HLD. Pt states that his fasting FSBS has been running 80's-low 100's range since last appt. Pt denies any low readings <60. Pt reports taking Metformin, Januvia, Lisinopril, and Lipitor and is following heart healthy/diabetic/DASH diet and excercise plan as directed. Pt reports feeling well today and has no concerns or complaints. Pt reports having diabetic eye exam 6/1/22 with [facility]. UTD on urine microalbumin and diabetic foot exam 5/2/22        ASSESSMENT/PLAN:  Below is the assessment and plan developed based on review of pertinent history, physical exam, labs, studies, and medications. 1. Type 2 diabetes mellitus without complication, without long-term current use of insulin (HCC)  Pt states that his fasting FSBS has been running 80's-low 100's range since last appt. Pt denies any low readings <60. Pt reports taking Metformin and Januvia and is following heart healthy/diabetic/DASH diet and excercise plan as directed. Pt's latest A1C was well controlled at 6.9% (goal <7.0%) on 6/21/22. Discussed with pt. Will have pt continue current dose of Metformin and Januvia for now-refills given and continue heart healthy/diabetic/DASH diet and exercise plan as before. Will check A1C today. Will adjust diabetes medications based on results if needed. Pt to f/u with me in 3 months. Will monitor. Pt reports having diabetic eye exam 6/1/22 with [facility]. UTD on urine microalbumin and diabetic foot exam 5/2/22  - Hemoglobin A1C; Future  - Comprehensive Metabolic Panel; Future  - SITagliptin (JANUVIA) 100 MG tablet; Take 1 tablet by mouth daily  Dispense: 30 tablet; Refill: 5  - metFORMIN (GLUCOPHAGE) 1000 MG tablet;  Take 1 tablet by mouth 2 times daily (with reducible umbilical hernia unchanged on physical exam. Pt asymptomatic. Pt was previously referred to General Surgery for further evaluation. Pt states that he never heard from them. Discussed with pt. Will re-refer to General Surgery for further evaluation. Pt to f/u with me in 3 months. Will monitor.   - Maninder Mera MD, General Surgery, [facility]    6. Screening for colon cancer  Pt was previously referred to GI for screening colonoscopy. Pt states that he never heard from them. Discussed with pt. Will re-refer to GI.   - [address] Louis MD, Gastroenterology, [place]    7. Encounter for immunization  Pt UTD on Pneumonia and Tdap vax. Pt declined Covid and Shingrix vax. Will continue to encourage pt to get UTD on these vaccines at future appointments. 8. Special screening for malignant neoplasm of prostate  Pt due for screening PSA. Will draw today. - PSA Screening; Future      Return in about 3 months (around 2022) for To recheck chronic conditions. Call sooner for concerns. .        SUBJECTIVE/OBJECTIVE:    HPI 22-  Reny Reynoso (: 1965) is a 56 y.o. male, Established patient patient, here for evaluation of the following chief complaint(s):  Follow-up (Pt here today to recheck DM2, HTN, HLD. Pt states that his fasting FSBS has been running  range since last appt. Pt reports taking Glipizide, Metformin, Basaglar, Lisinopril, and Lipitor and is following heart healthy/diabetic/DASH diet and excercise plan as directed. Pt states that he has not had to take the Novolog SSI since last appt as his FSBS has not been above 149. Pt had a few low readings of 50 and 57 on a few occasions mid day but denies any symptoms of hypoglycemia. )    Pt reports feeling well today and has no concerns or complaints. Pt reports having diabetic eye exam yesterday, 22 with [facility]. UTD on urine microalbumin and diabetic foot exam 22.      HPI todayPurvi Torres (: 1965) is a 56 y.o. male, Established patient patient, here for evaluation of the following chief complaint(s):  Follow-up (Pt here today to recheck DM2, HTN, HLD. Pt states that his fasting FSBS has been running 80's-low 100's range since last appt. Pt denies any low readings <60. Pt reports taking Metformin, Januvia, Lisinopril, and Lipitor and is following heart healthy/diabetic/DASH diet and excercise plan as directed. Pt reports feeling well today and has no concerns or complaints. Pt reports having diabetic eye exam 6/1/22 with [facility]. UTD on urine microalbumin and diabetic foot exam 5/2/22)   Please see ROS/Assessment and Plan sections for full details of all items addressed during today's appointment. No Known Allergies  [unfilled]  Past Medical History:   Diagnosis Date    Diabetes ([facility] [address]     Essential hypertension     Hyperlipidemia      History reviewed. No pertinent surgical history. Family History   Problem Relation Age of Onset    Hypertension Mother     Diabetes Mother     Hypertension Paternal Grandfather     Diabetes Paternal Grandfather     Hypertension Paternal Grandmother     Diabetes Paternal Grandmother     Hypertension Maternal Grandfather     Diabetes Maternal Grandfather     Hypertension Maternal Grandmother     Diabetes Maternal Grandmother     Hypertension Father     Diabetes Father      Social History     Tobacco Use   Smoking Status Never   Smokeless Tobacco Never         Review of Systems   Constitutional: Negative. Negative for appetite change, chills, diaphoresis, fatigue, fever and unexpected weight change. HENT: Negative. Negative for congestion, ear discharge, ear pain, facial swelling, hearing loss, mouth sores, nosebleeds, postnasal drip, rhinorrhea, sinus pressure, sinus pain, sneezing, sore throat, tinnitus, trouble swallowing and voice change. Eyes: Negative. Negative for pain, discharge and visual disturbance. Respiratory: Negative.   Negative for cough, chest tightness, shortness of breath, wheezing and stridor. Cardiovascular: Negative. Negative for chest pain, palpitations and leg swelling. Gastrointestinal:  Negative for abdominal distention, abdominal pain, anal bleeding, blood in stool, constipation, diarrhea, nausea, rectal pain and vomiting. Small nontender reducible umbilical hernia-unchanged since last appt per pt. Asymptomatic   Endocrine: Negative. Negative for cold intolerance, heat intolerance, polydipsia, polyphagia and polyuria. Genitourinary: Negative. Negative for decreased urine volume, difficulty urinating, dysuria, flank pain, frequency, genital sores, hematuria, penile discharge, penile pain, penile swelling, scrotal swelling, testicular pain and urgency. Musculoskeletal: Negative. Negative for arthralgias, back pain, gait problem, joint swelling, myalgias, neck pain and neck stiffness. Skin: Negative. Negative for color change, pallor, rash and wound. Allergic/Immunologic: Negative. Negative for environmental allergies. Neurological: Negative. Negative for dizziness, tremors, syncope, weakness, light-headedness, numbness and headaches. Hematological: Negative. Negative for adenopathy. Does not bruise/bleed easily. Psychiatric/Behavioral: Negative. Negative for dysphoric mood, hallucinations, self-injury, sleep disturbance and suicidal ideas. The patient is not nervous/anxious. Vitals:    08/24/22 0910   BP: 132/78   Pulse: 87   SpO2: 99%       Physical Exam  Vitals reviewed. Constitutional:       General: He is not in acute distress. Appearance: Normal appearance. He is obese. He is not ill-appearing, toxic-appearing or diaphoretic. HENT:      Head: Normocephalic and atraumatic. Mouth/Throat:      Mouth: Mucous membranes are moist.   Eyes:      General: No scleral icterus. Extraocular Movements: Extraocular movements intact. Pupils: Pupils are equal, round, and reactive to light. Cardiovascular:      Rate and Rhythm: Normal rate and regular rhythm. Pulses: Normal pulses. Heart sounds: Normal heart sounds. No murmur heard. No friction rub. No gallop. Pulmonary:      Effort: Pulmonary effort is normal. No respiratory distress. Breath sounds: Normal breath sounds. No stridor. No wheezing, rhonchi or rales. Chest:      Chest wall: No tenderness. Abdominal:      General: Abdomen is flat. Bowel sounds are normal. There is no distension. Palpations: Abdomen is soft. There is no mass. Tenderness: There is no abdominal tenderness. There is no right CVA tenderness, left CVA tenderness, guarding or rebound. Hernia: A hernia (small nontender reducible umbilical hernia-unchanged since last appt. Asymptomatic) is present. Musculoskeletal:         General: No swelling, tenderness, deformity or signs of injury. Normal range of motion. Cervical back: Normal range of motion and neck supple. No rigidity or tenderness. Right lower leg: No edema. Left lower leg: No edema. Comments: Gait steady and unassisted   Lymphadenopathy:      Cervical: No cervical adenopathy. Skin:     General: Skin is warm. Capillary Refill: Capillary refill takes less than 2 seconds. Coloration: Skin is not jaundiced or pale. Findings: No bruising, erythema, lesion or rash. Neurological:      General: No focal deficit present. Mental Status: He is alert and oriented to person, place, and time. Cranial Nerves: No cranial nerve deficit. Sensory: No sensory deficit. Motor: No weakness. Coordination: Coordination normal.      Gait: Gait normal.   Psychiatric:         Mood and Affect: Mood normal.         Behavior: Behavior normal.         Thought Content:  Thought content normal.         Judgment: Judgment normal.     Component      Latest Ref Rng & Units 6/21/2022           3:49 PM   Sodium      138 - 145 mmol/L 140   Potassium      3.5 - 5.1 mmol/L 4.0   Chloride      98 - 107 mmol/L 104   CO2      21 - 32 mmol/L 29   Anion Gap      7 - 16 mmol/L 7   Glucose, Random      65 - 100 mg/dL 93   BUN,BUNPL      6 - 23 MG/DL 10   Creatinine      0.8 - 1.5 MG/DL 1.10   GFR African American      >60 ml/min/1.73m2 >60   GFR Non-African American      >60 ml/min/1.73m2 >60   CALCIUM, SERUM, 353120      8.3 - 10.4 MG/DL 9.7   Bilirubin      0.2 - 1.1 MG/DL 0.5   ALT      12 - 65 U/L 20   AST      15 - 37 U/L 15   Alk Phosphatase      50 - 136 U/L 67   Total Protein      6.3 - 8.2 g/dL 8.1   Albumin      3.5 - 5.0 g/dL 4.1   Globulin      2.3 - 3.5 g/dL 4.0 (H)   ALBUMIN/GLOBULIN RATIO      1.2 - 3.5   1.0 (L)     Component      Latest Ref Rng & Units 6/21/2022 5/2/2022           3:49 PM 10:12 AM   Hemoglobin A1C      4.20 - 6.30 % 6.9 (H) 10.2 (H)   eAG (mg/dL)      mg/dL 151 246     Component      Latest Ref Rng & Units 6/21/2022 4/18/2022           3:49 PM  9:43 AM   CHOLESTEROL, TOTAL, [id number]      <200 MG/ 194   Triglycerides      35 - 150 MG/ 97   HDL Cholesterol      40 - 60 MG/DL 44 46   LDL Calculated      <100 MG/DL 77.4 130 (H)   VLDL Cholesterol Calculated      6.0 - 23.0 MG/DL 21.6    Chol/HDL Ratio       3.3    VLDL      5 - 40 mg/dL  18     Component      Latest Ref Rng & Units 5/2/2022          11:19 AM   Microalbumin urine, POC      MG/L negative   Microalb/Creat Ratio POC      <30 MG/G <30     PLEASE NOTE:  This document has been produced using voice recognition software. Unrecognized errors in transcription may be present. On this date 8/24/2022 I have spent 30 minutes reviewing previous notes, test results and face to face with the patient discussing the diagnosis and importance of compliance with the treatment plan as well as documenting on the day of the visit. An electronic signature was used to authenticate this note.   -- Pattie Vail, LUNA - NP

## 2022-08-25 DIAGNOSIS — E78.00 PURE HYPERCHOLESTEROLEMIA: ICD-10-CM

## 2022-08-25 DIAGNOSIS — E11.9 TYPE 2 DIABETES MELLITUS WITHOUT COMPLICATION, WITHOUT LONG-TERM CURRENT USE OF INSULIN (HCC): Primary | ICD-10-CM

## 2022-08-25 DIAGNOSIS — I10 PRIMARY HYPERTENSION: ICD-10-CM

## 2022-08-25 LAB
EST. AVERAGE GLUCOSE BLD GHB EST-MCNC: 131 MG/DL
HBA1C MFR BLD: 6.2 % (ref 4.8–5.6)

## 2022-08-25 RX ORDER — ATORVASTATIN CALCIUM 40 MG/1
40 TABLET, FILM COATED ORAL DAILY
Qty: 30 TABLET | Refills: 5 | Status: SHIPPED | OUTPATIENT
Start: 2022-08-25

## 2022-09-30 ENCOUNTER — OFFICE VISIT (OUTPATIENT)
Dept: SURGERY | Age: 57
End: 2022-09-30
Payer: COMMERCIAL

## 2022-09-30 VITALS
HEART RATE: 81 BPM | SYSTOLIC BLOOD PRESSURE: 124 MMHG | BODY MASS INDEX: 34.81 KG/M2 | HEIGHT: 72 IN | OXYGEN SATURATION: 91 % | DIASTOLIC BLOOD PRESSURE: 80 MMHG | WEIGHT: 257 LBS

## 2022-09-30 DIAGNOSIS — M62.08 DIASTASIS OF RECTUS ABDOMINIS: ICD-10-CM

## 2022-09-30 DIAGNOSIS — K43.6 VENTRAL HERNIA WITH OBSTRUCTION AND WITHOUT GANGRENE: Primary | ICD-10-CM

## 2022-09-30 DIAGNOSIS — E66.9 OBESITY (BMI 35.0-39.9 WITHOUT COMORBIDITY): ICD-10-CM

## 2022-09-30 PROCEDURE — 99204 OFFICE O/P NEW MOD 45 MIN: CPT | Performed by: SURGERY

## 2022-09-30 NOTE — PROGRESS NOTES
true    Ran Out of Food in the Last Year: Never true     Social History     Tobacco Use   Smoking Status Never   Smokeless Tobacco Never     Family History   Problem Relation Age of Onset    Hypertension Mother     Diabetes Mother     Hypertension Paternal Grandfather     Diabetes Paternal Grandfather     Hypertension Paternal Grandmother     Diabetes Paternal Grandmother     Hypertension Maternal Grandfather     Diabetes Maternal Grandfather     Hypertension Maternal Grandmother     Diabetes Maternal Grandmother     Hypertension Father     Diabetes Father        ROS: The patient has no difficulty with chest pain or shortness of breath. No fever or chills. The patient denies any personal or family history of abnormal clotting or bleeding. Comprehensive review of systems was otherwise unremarkable except as noted above. Physical Exam:   Constitutional: Alert oriented cooperative patient in no acute distress. /80   Pulse 81   Ht 6' (1.829 m)   Wt 257 lb (116.6 kg)   SpO2 91%   BMI 34.86 kg/m²   Eyes:Sclera are clear without icterus. ENMT: no obvious neck masses, no ear or lip lesions  CV: RRR. Normal perfusion  Resp: No JVD. Breathing is  non-labored. GI: Obese, soft and non-distended, prominent bulge at umbilicus with thinning but intact skin. Soft bulging present consistent with incarcerated fatty tissue. Full reduction not attempted and defect approximately 12 mm in diameter. Surrounding wide rectus diastases of at least 8 to 10 cm  Musculoskeletal: unremarkable with normal function.    Neuro:  No obvious focal deficits  Psychiatric: normal affect and mood, no memory impairment    Lab Results   Component Value Date/Time    WBC 12.1 04/07/2022 03:47 PM    HGB 15.0 04/07/2022 03:47 PM    HCT 47.3 04/07/2022 03:47 PM     04/07/2022 03:47 PM    MCV 81.4 04/07/2022 03:47 PM    MCV 82.8 01/21/2022 11:23 AM       Lab Results   Component Value Date     08/24/2022    K 4.3 08/24/2022  (H) 08/24/2022    CO2 29 08/24/2022    BUN 12 08/24/2022    CREATININE 1.10 08/24/2022    GLUCOSE 109 (H) 08/24/2022    CALCIUM 9.2 08/24/2022    PROT 7.4 08/24/2022    LABALBU 3.5 08/24/2022    BILITOT 0.3 08/24/2022    ALKPHOS 60 08/24/2022    AST 12 (L) 08/24/2022    ALT 23 08/24/2022    LABGLOM >60 08/24/2022    GFRAA >60 08/24/2022    AGRATIO 1.5 04/18/2022    GLOB 3.9 (H) 08/24/2022         No results found for: AMYLASE  No results found for: LIPASE         Assessment/Plan:     Kena Singh is a 56 y.o. male who presents with an asymptomatic periumbilical hernia within a wide rectus diastases. The hernia contains incarcerated fatty tissue that I did not try to reduce completely. The defect is less than 15 mm. He has had no prior incisions or attempts at repair. He has had some weight fluctuations in the past which has led to his rectus diastases. We discussed that this may or may not require repair depending on evaluation of potential bowel risk. The wide rectus diastases with this defect would require the use of prosthetic material to widely bridge the defect and the diastases for any durability. Though I use primarily nonpermanent mesh, the need for permanent mesh is sometimes required which carries a 20% bowel risk to the patient in their lifetime. The recurrence rate for repair is also in the 50% range and the recurrences are more dangerous than the initial hernias. We will obtain a CT scan to evaluate his abdominal wall. We did discuss that the thinning of the skin over the umbilicus may end up being an indication for repair. I will see him back in several weeks following the CT scan. I doubt that he will get into any trouble in the interim. He also understands that continued weight gain may enlarge the hernia defect as well as it would put a stress on any repair. There are no exercises that we will lessen a rectus diastases. I will see him back in 4 weeks following the CT scan. A copy of this note is sent to the referring physician. Patient Active Problem List    Diagnosis Date Noted    Ventral hernia with obstruction and without gangrene 09/30/2022     Priority: Medium    Diastasis of rectus abdominis 09/30/2022     Priority: Medium    Abnormal EKG 34/83/5538    Umbilical hernia without obstruction and without gangrene 04/18/2022    HTN (hypertension) 04/10/2022    New onset type 2 diabetes mellitus ([address] 04/07/2022    Elevated fasting glucose 01/24/2022    Hyperlipidemia 01/24/2022    Obesity (BMI 35.0-39.9 without comorbidity) 01/21/2022        Level of MDM (Based on 2/3 elements below)  Number and Complexity of Problems Addressed Amount and/or Complexity of Data to be Reviewed and Analyzed  *Each unique test, order, or document contributes to the combination of 2 or combination of 3 in Category 1 below. Risk of Complications and/or Morbidity or Mortality of pt Management     37347  80090 SF Minimal  ?1self-limited or minor problem Minimal or none Minimal risk of morbidity from additional diagnostic testing or Rx   00443  11448 Low Low  ? 2or more self-limited or minor problems;    or  ? 1stable chronic illness;    or  ?1acute, uncomplicated illness or injury   Limited  (Must meet the requirements of at least 1 of the 2 categories)  Category 1: Tests and documents   ? Any combination of 2 from the following:  ?Review of prior external note(s) from each unique source*;  ?review of the result(s) of each unique test*;   ?ordering of each unique test*    or   Category 2: Assessment requiring an independent historian(s)  (For the categories of independent interpretation of tests and discussion of management or test interpretation, see moderate or high) Low risk of morbidity from additional diagnostic testing or treatment     17467  17826 Mod Moderate  ? 1or more chronic illnesses with exacerbation, progression, or side effects of treatment;    or  ?2or more stable chronic illnesses;    or 1undiagnosed new problem with uncertain prognosis;    or  ?1acute illness with systemic symptoms;    or  ?1acute complicated injury   Moderate:  (Must meet the requirements of 1 out of 3 categories)    Category 1: Tests, documents, or independent historian(s)  ? Any combination of 3 from the following:   ?Review of prior external note(s) from each unique source*;  ?Review of the result(s) of each unique test*;  ?Ordering of each unique test*;  ?Assessment requiring an independent historian(s)    or  Category 2: Independent interpretation of tests   ? Independent interpretation of a test performed by another physician/other qualified health care professional (not separately reported);     or  Category 3: Discussion of management or test interpretation  ? Discussion of management or test interpretation with external physician/other qualified health care professional/appropriate source (not separately reported)   Moderate risk of morbidity from additional diagnostic testing or treatment  Examples only: ? ? Prescription drug management - including advanced wound care prescription wound care products  (eg Iodosorb, hydrofera, silvadene, collagen, puracol plus, optiloc, biologics - placenta, Theraskin, Apligraf). Note also that if home health care is involved, they will not apply topical therapies without a prescription/order from physician    ? Decision regarding minor surgery with identified patient or procedure risk factors  ? Decision regarding elective major surgery without identified patient or procedure risk factors   ? Diagnosis or treatment significantly limited by social determinants of health       26650  06740 High High  ? 1or more chronic illnesses with severe exacerbation, progression, or side effects of treatment;    or  ?1 acute or chronic illness or injury that poses a threat to life or bodily function   Extensive  (Must meet the requirements of at least 2 out of 3 categories)    Category 1: Tests, documents, or independent historian(s)  ? Any combination of 3 from the following:   ?Review of prior external note(s) from each unique source*;  ?Review of the result(s) of each unique test*;   ?Ordering of each unique test*;   ?Assessment requiring an independent historian(s)    or   Category 2: Independent interpretation of tests   ? Independent interpretation of a test performed by another physician/other qualified health care professional (not separately reported);     or  Category 3: Discussion of management or test interpretation  ? Discussion of management or test interpretation with external physician/other qualified health care professional/appropriate source (not separately reported)   High risk of morbidity from additional diagnostic testing or treatment  Examples only:  ?Drug therapy requiring intensive monitoring for toxicity  ? Decision regarding elective major surgery with identified patient or procedure risk factors  ? Decision regarding emergency major surgery  ? Decision regarding hospitalization  ? Decision not to resuscitate or to de-escalate care because of poor prognosis               Natalie Quiros MD,  FACS

## 2022-10-14 ENCOUNTER — HOSPITAL ENCOUNTER (OUTPATIENT)
Dept: CT IMAGING | Age: 57
Discharge: HOME | End: 2022-10-16
Payer: COMMERCIAL

## 2022-10-14 DIAGNOSIS — M62.08 DIASTASIS OF RECTUS ABDOMINIS: ICD-10-CM

## 2022-10-14 DIAGNOSIS — E78.00 PURE HYPERCHOLESTEROLEMIA: ICD-10-CM

## 2022-10-14 DIAGNOSIS — K43.6 VENTRAL HERNIA WITH OBSTRUCTION AND WITHOUT GANGRENE: ICD-10-CM

## 2022-10-14 DIAGNOSIS — E11.9 TYPE 2 DIABETES MELLITUS WITHOUT COMPLICATION, WITHOUT LONG-TERM CURRENT USE OF INSULIN (HCC): ICD-10-CM

## 2022-10-14 DIAGNOSIS — E66.9 OBESITY (BMI 35.0-39.9 WITHOUT COMORBIDITY): ICD-10-CM

## 2022-10-14 DIAGNOSIS — I10 PRIMARY HYPERTENSION: ICD-10-CM

## 2022-10-14 PROCEDURE — 74176 CT ABD & PELVIS W/O CONTRAST: CPT

## 2022-11-11 ENCOUNTER — OFFICE VISIT (OUTPATIENT)
Dept: GASTROENTEROLOGY | Age: 57
End: 2022-11-11
Payer: COMMERCIAL

## 2022-11-11 VITALS
HEART RATE: 71 BPM | BODY MASS INDEX: 34.46 KG/M2 | WEIGHT: 260 LBS | OXYGEN SATURATION: 94 % | HEIGHT: 73 IN | DIASTOLIC BLOOD PRESSURE: 82 MMHG | SYSTOLIC BLOOD PRESSURE: 138 MMHG | TEMPERATURE: 96.8 F

## 2022-11-11 DIAGNOSIS — Z12.11 ENCOUNTER FOR SCREENING COLONOSCOPY: Primary | ICD-10-CM

## 2022-11-11 PROCEDURE — 3078F DIAST BP <80 MM HG: CPT | Performed by: INTERNAL MEDICINE

## 2022-11-11 PROCEDURE — 3074F SYST BP LT 130 MM HG: CPT | Performed by: INTERNAL MEDICINE

## 2022-11-11 PROCEDURE — 99202 OFFICE O/P NEW SF 15 MIN: CPT | Performed by: INTERNAL MEDICINE

## 2022-11-11 RX ORDER — POLYETHYLENE GLYCOL 3350, SODIUM SULFATE ANHYDROUS, SODIUM BICARBONATE, SODIUM CHLORIDE, POTASSIUM CHLORIDE 236; 22.74; 6.74; 5.86; 2.97 G/4L; G/4L; G/4L; G/4L; G/4L
4 POWDER, FOR SOLUTION ORAL ONCE
Qty: 4000 ML | Refills: 0 | Status: SHIPPED | OUTPATIENT
Start: 2022-11-11 | End: 2022-11-11

## 2022-11-11 ASSESSMENT — ENCOUNTER SYMPTOMS
SHORTNESS OF BREATH: 0
TROUBLE SWALLOWING: 0
COLOR CHANGE: 0
BLOOD IN STOOL: 0
ABDOMINAL PAIN: 0
VOMITING: 0

## 2022-11-11 NOTE — H&P (VIEW-ONLY)
Veronika Carrington (:  1965) is a 56 y.o. male, new patient here for evaluation of the following chief complaint(s):  Colonoscopy         ASSESSMENT/PLAN:  1. Encounter for screening colonoscopy  The following orders have not been finalized:  -     polyethylene glycol (GOLYTELY) 236 g solution           Subjective   SUBJECTIVE/OBJECTIVE the patient presents to arrange a screening colonoscopic evaluation, I rectal bleeding or change in bowel habits. No family history of colon cancer    Past Medical History:   Diagnosis Date    Diabetes ([address]     Essential hypertension     Hyperlipidemia        History reviewed. No pertinent surgical history. No Known Allergies       Review of Systems   Constitutional:  Negative for appetite change. HENT:  Negative for mouth sores and trouble swallowing. Respiratory:  Negative for shortness of breath. Cardiovascular:  Negative for chest pain. Gastrointestinal:  Negative for abdominal pain, blood in stool and vomiting. Skin:  Negative for color change. Allergic/Immunologic: Negative for food allergies. Neurological:  Negative for seizures and weakness. Hematological:  Does not bruise/bleed easily. Objective   Physical Exam  HENT:      Head: Normocephalic. Mouth/Throat:      Mouth: Mucous membranes are moist.   Eyes:      General: No scleral icterus. Cardiovascular:      Rate and Rhythm: Normal rate and regular rhythm. Pulmonary:      Effort: No respiratory distress. Abdominal:      General: There is no distension. Tenderness: There is no abdominal tenderness. There is no rebound. Lymphadenopathy:      Cervical: No cervical adenopathy. Skin:     Coloration: Skin is not jaundiced. Findings: No bruising. Neurological:      General: No focal deficit present. Mental Status: He is alert.             Current Outpatient Medications   Medication Sig Dispense Refill    atorvastatin (LIPITOR) 40 MG tablet Take 1 tablet by mouth daily 30 tablet 5    SITagliptin (JANUVIA) 100 MG tablet Take 1 tablet by mouth daily 30 tablet 5    metFORMIN (GLUCOPHAGE) 1000 MG tablet Take 1 tablet by mouth 2 times daily (with meals) 60 tablet 5    lisinopril (PRINIVIL;ZESTRIL) 10 MG tablet Take 1 tablet by mouth daily 30 tablet 5    Lancets MISC E11.65. CHECK GLUCOSE AC AND QHS. No current facility-administered medications for this visit. Family History   Problem Relation Age of Onset    Hypertension Mother     Diabetes Mother     Hypertension Paternal Grandfather     Diabetes Paternal Grandfather     Hypertension Paternal Grandmother     Diabetes Paternal Grandmother     Hypertension Maternal Grandfather     Diabetes Maternal Grandfather     Hypertension Maternal Grandmother     Diabetes Maternal Grandmother     Hypertension Father     Diabetes Father        Return for colonoscopy. An electronic signature was used to authenticate this note.     --Twila Julio MD

## 2022-11-15 ENCOUNTER — PREP FOR PROCEDURE (OUTPATIENT)
Dept: GASTROENTEROLOGY | Age: 57
End: 2022-11-15

## 2022-11-15 PROBLEM — Z12.11 ENCOUNTER FOR SCREENING COLONOSCOPY: Status: ACTIVE | Noted: 2022-11-15

## 2022-11-15 RX ORDER — SODIUM CHLORIDE 0.9 % (FLUSH) 0.9 %
5-40 SYRINGE (ML) INJECTION EVERY 12 HOURS SCHEDULED
Status: CANCELLED | OUTPATIENT
Start: 2022-11-15

## 2022-11-15 RX ORDER — SODIUM CHLORIDE 0.9 % (FLUSH) 0.9 %
5-40 SYRINGE (ML) INJECTION PRN
Status: CANCELLED | OUTPATIENT
Start: 2022-11-15

## 2022-11-15 RX ORDER — SODIUM CHLORIDE 9 MG/ML
25 INJECTION, SOLUTION INTRAVENOUS PRN
Status: CANCELLED | OUTPATIENT
Start: 2022-11-15

## 2022-11-17 NOTE — PERIOP NOTE
calling [phone] if your surgery is at the [facility] or [phone] if your surgery is at the [facility].

## 2022-11-21 ENCOUNTER — ANESTHESIA EVENT (OUTPATIENT)
Dept: ENDOSCOPY | Age: 57
End: 2022-11-21
Payer: COMMERCIAL

## 2022-11-21 ENCOUNTER — ANESTHESIA (OUTPATIENT)
Dept: ENDOSCOPY | Age: 57
End: 2022-11-21
Payer: COMMERCIAL

## 2022-11-21 ENCOUNTER — HOSPITAL ENCOUNTER (OUTPATIENT)
Age: 57
Setting detail: OUTPATIENT SURGERY
Discharge: HOME | End: 2022-11-21
Attending: INTERNAL MEDICINE | Admitting: INTERNAL MEDICINE
Payer: COMMERCIAL

## 2022-11-21 VITALS
SYSTOLIC BLOOD PRESSURE: 114 MMHG | BODY MASS INDEX: 33.66 KG/M2 | RESPIRATION RATE: 18 BRPM | HEIGHT: 73 IN | TEMPERATURE: 98.6 F | DIASTOLIC BLOOD PRESSURE: 76 MMHG | WEIGHT: 254 LBS | HEART RATE: 54 BPM | OXYGEN SATURATION: 98 %

## 2022-11-21 DIAGNOSIS — Z12.11 ENCOUNTER FOR SCREENING COLONOSCOPY: ICD-10-CM

## 2022-11-21 LAB
GLUCOSE BLD STRIP.AUTO-MCNC: 106 MG/DL (ref 65–100)
SERVICE CMNT-IMP: ABNORMAL

## 2022-11-21 PROCEDURE — 6360000002 HC RX W HCPCS: Performed by: NURSE ANESTHETIST, CERTIFIED REGISTERED

## 2022-11-21 PROCEDURE — 2580000003 HC RX 258: Performed by: NURSE ANESTHETIST, CERTIFIED REGISTERED

## 2022-11-21 PROCEDURE — 7100000011 HC PHASE II RECOVERY - ADDTL 15 MIN: Performed by: INTERNAL MEDICINE

## 2022-11-21 PROCEDURE — 82962 GLUCOSE BLOOD TEST: CPT

## 2022-11-21 PROCEDURE — 88305 TISSUE EXAM BY PATHOLOGIST: CPT

## 2022-11-21 PROCEDURE — 3700000001 HC ADD 15 MINUTES (ANESTHESIA): Performed by: INTERNAL MEDICINE

## 2022-11-21 PROCEDURE — 3700000000 HC ANESTHESIA ATTENDED CARE: Performed by: INTERNAL MEDICINE

## 2022-11-21 PROCEDURE — 3609010600 HC COLONOSCOPY POLYPECTOMY SNARE/COLD BIOPSY: Performed by: INTERNAL MEDICINE

## 2022-11-21 PROCEDURE — 2709999900 HC NON-CHARGEABLE SUPPLY: Performed by: INTERNAL MEDICINE

## 2022-11-21 PROCEDURE — 7100000010 HC PHASE II RECOVERY - FIRST 15 MIN: Performed by: INTERNAL MEDICINE

## 2022-11-21 RX ORDER — SODIUM CHLORIDE 9 MG/ML
INJECTION, SOLUTION INTRAVENOUS PRN
Status: CANCELLED | OUTPATIENT
Start: 2022-11-21

## 2022-11-21 RX ORDER — SODIUM CHLORIDE 0.9 % (FLUSH) 0.9 %
5-40 SYRINGE (ML) INJECTION PRN
Status: CANCELLED | OUTPATIENT
Start: 2022-11-21

## 2022-11-21 RX ORDER — SODIUM CHLORIDE 0.9 % (FLUSH) 0.9 %
5-40 SYRINGE (ML) INJECTION EVERY 12 HOURS SCHEDULED
Status: CANCELLED | OUTPATIENT
Start: 2022-11-21

## 2022-11-21 RX ORDER — LIDOCAINE HYDROCHLORIDE 10 MG/ML
1 INJECTION, SOLUTION INFILTRATION; PERINEURAL
Status: CANCELLED | OUTPATIENT
Start: 2022-11-21 | End: 2022-11-22

## 2022-11-21 RX ORDER — SODIUM CHLORIDE, SODIUM LACTATE, POTASSIUM CHLORIDE, CALCIUM CHLORIDE 600; 310; 30; 20 MG/100ML; MG/100ML; MG/100ML; MG/100ML
INJECTION, SOLUTION INTRAVENOUS CONTINUOUS
Status: CANCELLED | OUTPATIENT
Start: 2022-11-21

## 2022-11-21 RX ORDER — PROPOFOL 10 MG/ML
INJECTION, EMULSION INTRAVENOUS PRN
Status: DISCONTINUED | OUTPATIENT
Start: 2022-11-21 | End: 2022-11-21 | Stop reason: SDUPTHER

## 2022-11-21 RX ORDER — ONDANSETRON 2 MG/ML
4 INJECTION INTRAMUSCULAR; INTRAVENOUS
Status: CANCELLED | OUTPATIENT
Start: 2022-11-21 | End: 2022-11-22

## 2022-11-21 RX ORDER — SODIUM CHLORIDE, SODIUM LACTATE, POTASSIUM CHLORIDE, CALCIUM CHLORIDE 600; 310; 30; 20 MG/100ML; MG/100ML; MG/100ML; MG/100ML
INJECTION, SOLUTION INTRAVENOUS CONTINUOUS PRN
Status: DISCONTINUED | OUTPATIENT
Start: 2022-11-21 | End: 2022-11-21 | Stop reason: SDUPTHER

## 2022-11-21 RX ADMIN — PROPOFOL 70 MG: 10 INJECTION, EMULSION INTRAVENOUS at 10:53

## 2022-11-21 RX ADMIN — PROPOFOL 200 MCG/KG/MIN: 10 INJECTION, EMULSION INTRAVENOUS at 10:54

## 2022-11-21 RX ADMIN — SODIUM CHLORIDE, SODIUM LACTATE, POTASSIUM CHLORIDE, AND CALCIUM CHLORIDE: 600; 310; 30; 20 INJECTION, SOLUTION INTRAVENOUS at 10:51

## 2022-11-21 NOTE — INTERVAL H&P NOTE
Update History & Physical    The patient's History and Physical of November 11,  was reviewed with the patient and I examined the patient. There was no change. The surgical site was confirmed by the patient and me. Plan: The risks, benefits, expected outcome, and alternative to the recommended procedure have been discussed with the patient. Patient understands and wants to proceed with the procedure.      Electronically signed by Twila Julio MD on 11/21/2022 at 10:18 AM

## 2022-11-21 NOTE — PROCEDURES
Operative Report    Patient: Mindy Valente MRN: [id number]      YOB: 1965  Age: 56 y.o. Sex: male            Indications:  screening for colon cancer [unfilled]     Preoperative Evaluation: The patient was evaluated prior to the procedure in the GI lab admission area, the patient ASA was recorded . Consent was obtained from the patient with the risk of perforation bleeding and aspiration. Anesthesia: MOISES-per anesthesia    Complications: None; patient tolerated the procedure well. EBL -insignificant      Procedure: The patient was sedated in the left lateral decubitus position. Scope was advanced from the rectum to the cecum. Evaluation was performed to the cecum twice. The scope was withdrawn to the rectum, retroflexed view was performed. The rectal exam was normal.  Preparation was fair [place] score of 0/2/3:5 . Findings:   Exam to cecum. Suboptimal preparation in the cecum solid stools. 5 ascending colon polyps ranging in size from 4 to 8 mm removed with a combination of cold snare and hot snaring. Normal descending mucosa. Normal sigmoid mucosa. 6 mm rectal polyp hot snared. Claus Ortiz       Postoperative Diagnosis: Ascending and rectal polyps    [address]67 Colonoscopy, Flexible; with removal of tumor(s), polyp(s), or other lesion(s) by snare technique      Recommendations: Colonoscopy in a year secondary to the suboptimal prep in the number of polyps      Signed By:  Lucinda Porter MD     November 21, 2022

## 2022-11-21 NOTE — ANESTHESIA PRE PROCEDURE
Department of Anesthesiology  Preprocedure Note       Name:  Robert Ambrosio   Age:  56 y.o.  :  1965                                          MRN:  [id number]         Date:  2022      Surgeon: Mila Ernst):  Cecil Whitehead MD    Procedure: Procedure(s):  COLORECTAL CANCER SCREENING, NOT HIGH RISK    Medications prior to admission:   Prior to Admission medications    Medication Sig Start Date End Date Taking? Authorizing Provider   Lancets MISC E11.65. CHECK GLUCOSE AC AND QHS. 4/10/22   Ar Automatic Reconciliation       Current medications:    No current facility-administered medications for this encounter.        Allergies:  No Known Allergies    Problem List:    Patient Active Problem List   Diagnosis Code    Obesity (BMI 35.0-39.9 without comorbidity) E66.9    Elevated fasting glucose R73.01    New onset type 2 diabetes mellitus (HCC) E11.9    HTN (hypertension) G55    Umbilical hernia without obstruction and without gangrene K42.9    Abnormal EKG R94.31    Hyperlipidemia E78.5    Ventral hernia with obstruction and without gangrene K43.6    Diastasis of rectus abdominis M62.08    Encounter for screening colonoscopy Z12.11       Past Medical History:        Diagnosis Date    Diabetes ([facility] [address] 2022 new onset- Rx metformin (not taking) 2022 A1C was 10.0,  2022  A1C 6.2  2022- states fbs 100- denies hypoglycemic episodes    Essential hypertension     stopped lisinopril    History of  novel coronavirus disease (COVID-19) 2022    denies hospitalization    Hyperlipidemia     stopped statin       Past Surgical History:        Procedure Laterality Date    ORTHOPEDIC SURGERY Right     pins in hand       Social History:    Social History     Tobacco Use    Smoking status: Never    Smokeless tobacco: Never   Substance Use Topics    Alcohol use: Never                                Counseling given: Not Answered      Vital Signs (Current):   Vitals:    22 1024 11/21/22 1001 11/21/22 1003   BP:  126/72    Pulse:  71 64   Resp:  14    Temp:  98.4 °F (36.9 °C)    TempSrc:  Oral    SpO2:  96%    Weight: 250 lb (113.4 kg) 254 lb (115.2 kg)    Height: 6' 1\" (1.854 m) 6' 1\" (1.854 m)                                               BP Readings from Last 3 Encounters:   11/21/22 126/72   11/11/22 138/82   09/30/22 124/80       NPO Status: Time of last liquid consumption: 2000                        Time of last solid consumption: 2000                        Date of last liquid consumption: 11/20/22                        Date of last solid food consumption: 11/19/22    BMI:   Wt Readings from Last 3 Encounters:   11/21/22 254 lb (115.2 kg)   11/11/22 260 lb (117.9 kg)   09/30/22 257 lb (116.6 kg)     Body mass index is 33.51 kg/m². CBC:   Lab Results   Component Value Date/Time    WBC 12.1 04/07/2022 03:47 PM    RBC 5.81 04/07/2022 03:47 PM    HGB 15.0 04/07/2022 03:47 PM    HCT 47.3 04/07/2022 03:47 PM    MCV 81.4 04/07/2022 03:47 PM    MCV 82.8 01/21/2022 11:23 AM    RDW 14.5 04/07/2022 03:47 PM     04/07/2022 03:47 PM       CMP:   Lab Results   Component Value Date/Time     08/24/2022 11:44 AM    K 4.3 08/24/2022 11:44 AM     08/24/2022 11:44 AM    CO2 29 08/24/2022 11:44 AM    BUN 12 08/24/2022 11:44 AM    CREATININE 1.10 08/24/2022 11:44 AM    GFRAA >60 08/24/2022 11:44 AM    AGRATIO 1.5 04/18/2022 09:43 AM    LABGLOM >60 08/24/2022 11:44 AM    GLUCOSE 109 08/24/2022 11:44 AM    PROT 7.4 08/24/2022 11:44 AM    CALCIUM 9.2 08/24/2022 11:44 AM    BILITOT 0.3 08/24/2022 11:44 AM    ALKPHOS 60 08/24/2022 11:44 AM    ALKPHOS 60 04/18/2022 09:43 AM    AST 12 08/24/2022 11:44 AM    ALT 23 08/24/2022 11:44 AM       POC Tests: No results for input(s): POCGLU, POCNA, POCK, POCCL, POCBUN, POCHEMO, POCHCT in the last 72 hours.     Coags: No results found for: PROTIME, INR, APTT    HCG (If Applicable): No results found for: PREGTESTUR, PREGSERUM, HCG, HCGQUANT     ABGs: No results found for: PHART, PO2ART, HDS9OJN, KLO5IHX, BEART, K1KCXEWJ     Type & Screen (If Applicable):  No results found for: LABABO, LABRH    Drug/Infectious Status (If Applicable):  No results found for: HIV, HEPCAB    COVID-19 Screening (If Applicable):   Lab Results   Component Value Date/Time    COVID19 Detected 01/21/2022 02:55 PM           Anesthesia Evaluation  Patient summary reviewed and Nursing notes reviewed  Airway: Mallampati: II  TM distance: >3 FB   Neck ROM: full  Mouth opening: > = 3 FB   Dental: normal exam         Pulmonary:Negative Pulmonary ROS breath sounds clear to auscultation                             Cardiovascular:Negative CV ROS    (+) hypertension (H/o but currently off meds):,         Rhythm: regular  Rate: normal                    Neuro/Psych:   Negative Neuro/Psych ROS              GI/Hepatic/Renal:            ROS comment: Obesity. Endo/Other:    (+) Diabetes (H/o of DM, pt off meds with good diet control), . Abdominal:             Vascular: negative vascular ROS. Other Findings:           Anesthesia Plan      TIVA     ASA 2       Induction: intravenous. Anesthetic plan and risks discussed with patient.                         Joseluis Gaxiola MD   11/21/2022

## 2022-11-21 NOTE — ANESTHESIA POSTPROCEDURE EVALUATION
Department of Anesthesiology  Postprocedure Note    Patient: Raj Woody  MRN: [id number]  YOB: 1965  Date of evaluation: 11/21/2022      Procedure Summary     Date: 11/21/22 Room / Location: [facility] ENDO 03 / [facility] ENDOSCOPY    Anesthesia Start: 9437 Anesthesia Stop: 1118    Procedure: COLONOSCOPY POLYPECTOMY SNARE Diagnosis:       Encounter for screening colonoscopy      (Encounter for screening colonoscopy [Z12.11])    Surgeons: Rao Stratton MD Responsible Provider: Angelica Gilliam MD    Anesthesia Type: TIVA ASA Status: 2          Anesthesia Type: TIVA    Zaki Phase I: Zaki Score: 10    Zaki Phase II: Zaki Score: 8      Anesthesia Post Evaluation    Patient location during evaluation: PACU  Patient participation: complete - patient participated  Level of consciousness: awake and alert  Airway patency: patent  Nausea & Vomiting: no nausea and no vomiting  Complications: no  Cardiovascular status: hemodynamically stable  Respiratory status: acceptable  Hydration status: euvolemic  Comments: Blood pressure 108/61, pulse 58, temperature 98.6 °F (37 °C), temperature source Skin, resp. rate 18, height 6' 1\" (1.854 m), weight 254 lb (115.2 kg), SpO2 96 %.       Pt stable for discharge from PACU  Multimodal analgesia pain management approach

## 2022-11-22 ENCOUNTER — NURSE ONLY (OUTPATIENT)
Dept: FAMILY MEDICINE CLINIC | Facility: CLINIC | Age: 57
End: 2022-11-22
Payer: COMMERCIAL

## 2022-11-22 DIAGNOSIS — E11.9 TYPE 2 DIABETES MELLITUS WITHOUT COMPLICATION, WITHOUT LONG-TERM CURRENT USE OF INSULIN (HCC): ICD-10-CM

## 2022-11-22 DIAGNOSIS — I10 PRIMARY HYPERTENSION: ICD-10-CM

## 2022-11-22 DIAGNOSIS — E11.9 TYPE 2 DIABETES MELLITUS WITHOUT COMPLICATION, WITHOUT LONG-TERM CURRENT USE OF INSULIN (HCC): Primary | ICD-10-CM

## 2022-11-22 DIAGNOSIS — E78.00 PURE HYPERCHOLESTEROLEMIA: ICD-10-CM

## 2022-11-22 PROCEDURE — 36415 COLL VENOUS BLD VENIPUNCTURE: CPT | Performed by: NURSE PRACTITIONER

## 2022-11-23 LAB
ALBUMIN SERPL-MCNC: 3.7 G/DL (ref 3.5–5)
ALBUMIN/GLOB SERPL: 1.1 {RATIO} (ref 0.4–1.6)
ALP SERPL-CCNC: 67 U/L (ref 50–136)
ALT SERPL-CCNC: 23 U/L (ref 12–65)
ANION GAP SERPL CALC-SCNC: 5 MMOL/L (ref 2–11)
AST SERPL-CCNC: 14 U/L (ref 15–37)
BILIRUB SERPL-MCNC: 0.3 MG/DL (ref 0.2–1.1)
BUN SERPL-MCNC: 13 MG/DL (ref 6–23)
CALCIUM SERPL-MCNC: 9.8 MG/DL (ref 8.3–10.4)
CHLORIDE SERPL-SCNC: 108 MMOL/L (ref 101–110)
CHOLEST SERPL-MCNC: 146 MG/DL
CO2 SERPL-SCNC: 29 MMOL/L (ref 21–32)
CREAT SERPL-MCNC: 1.3 MG/DL (ref 0.8–1.5)
EST. AVERAGE GLUCOSE BLD GHB EST-MCNC: 131 MG/DL
GLOBULIN SER CALC-MCNC: 3.5 G/DL (ref 2.8–4.5)
GLUCOSE SERPL-MCNC: 112 MG/DL (ref 65–100)
HBA1C MFR BLD: 6.2 % (ref 4.8–5.6)
HDLC SERPL-MCNC: 46 MG/DL (ref 40–60)
HDLC SERPL: 3.2 {RATIO}
LDLC SERPL CALC-MCNC: 88.8 MG/DL
POTASSIUM SERPL-SCNC: 4.6 MMOL/L (ref 3.5–5.1)
PROT SERPL-MCNC: 7.2 G/DL (ref 6.3–8.2)
SODIUM SERPL-SCNC: 142 MMOL/L (ref 133–143)
TRIGL SERPL-MCNC: 56 MG/DL (ref 35–150)
VLDLC SERPL CALC-MCNC: 11.2 MG/DL (ref 6–23)

## 2022-11-28 ENCOUNTER — OFFICE VISIT (OUTPATIENT)
Dept: FAMILY MEDICINE CLINIC | Facility: CLINIC | Age: 57
End: 2022-11-28
Payer: COMMERCIAL

## 2022-11-28 VITALS
SYSTOLIC BLOOD PRESSURE: 118 MMHG | OXYGEN SATURATION: 98 % | DIASTOLIC BLOOD PRESSURE: 80 MMHG | HEART RATE: 60 BPM | WEIGHT: 238 LBS | BODY MASS INDEX: 31.4 KG/M2

## 2022-11-28 DIAGNOSIS — I48.92 ATRIAL FLUTTER, UNSPECIFIED TYPE (HCC): ICD-10-CM

## 2022-11-28 DIAGNOSIS — E78.00 PURE HYPERCHOLESTEROLEMIA: ICD-10-CM

## 2022-11-28 DIAGNOSIS — E11.9 TYPE 2 DIABETES MELLITUS WITHOUT COMPLICATION, WITHOUT LONG-TERM CURRENT USE OF INSULIN (HCC): Primary | ICD-10-CM

## 2022-11-28 DIAGNOSIS — Z00.00 LABORATORY EXAM ORDERED AS PART OF ROUTINE GENERAL MEDICAL EXAMINATION: ICD-10-CM

## 2022-11-28 DIAGNOSIS — Z12.11 SCREENING FOR COLON CANCER: ICD-10-CM

## 2022-11-28 DIAGNOSIS — Z23 ENCOUNTER FOR IMMUNIZATION: ICD-10-CM

## 2022-11-28 DIAGNOSIS — N20.0 KIDNEY STONE: ICD-10-CM

## 2022-11-28 DIAGNOSIS — K42.9 UMBILICAL HERNIA WITHOUT OBSTRUCTION AND WITHOUT GANGRENE: ICD-10-CM

## 2022-11-28 DIAGNOSIS — I10 PRIMARY HYPERTENSION: ICD-10-CM

## 2022-11-28 PROCEDURE — 99214 OFFICE O/P EST MOD 30 MIN: CPT | Performed by: NURSE PRACTITIONER

## 2022-11-28 PROCEDURE — 3078F DIAST BP <80 MM HG: CPT | Performed by: NURSE PRACTITIONER

## 2022-11-28 PROCEDURE — 3044F HG A1C LEVEL LT 7.0%: CPT | Performed by: NURSE PRACTITIONER

## 2022-11-28 PROCEDURE — 3074F SYST BP LT 130 MM HG: CPT | Performed by: NURSE PRACTITIONER

## 2022-11-28 ASSESSMENT — ENCOUNTER SYMPTOMS
DIARRHEA: 0
APNEA: 0
EYES NEGATIVE: 1
STRIDOR: 0
NAUSEA: 0
COLOR CHANGE: 0
ABDOMINAL DISTENTION: 0
EYE PAIN: 0
SINUS PRESSURE: 0
VOICE CHANGE: 0
SHORTNESS OF BREATH: 0
CHOKING: 0
CHEST TIGHTNESS: 0
VOMITING: 0
RHINORRHEA: 0
CONSTIPATION: 0
ANAL BLEEDING: 0
ALLERGIC/IMMUNOLOGIC NEGATIVE: 1
ABDOMINAL PAIN: 0
WHEEZING: 0
BLOOD IN STOOL: 0
TROUBLE SWALLOWING: 0
FACIAL SWELLING: 0
EYE DISCHARGE: 0
RESPIRATORY NEGATIVE: 1
SORE THROAT: 0
RECTAL PAIN: 0
SINUS PAIN: 0
BACK PAIN: 0
COUGH: 0

## 2022-11-28 NOTE — PROGRESS NOTES
[address]  Phone: [phone] Fax [phone]  Jovita KeeneOxana Rice MS, APRN, FNP-C  11/28/2022   Chief Complaint   Patient presents with    Follow-up     Pt here today to recheck DM2, HTN, HLD. Pt states that his fasting FSBS has been running 90's-low 100's range since last appt. Pt denies any low readings <60. Pt stopped his Metformin, Januvia, Lisinopril, and Lipitor on his own a few months ago as he reports being very committed to following heart healthy/diabetic/DASH diet and excercise plan. Pt down to 238 lbs. Reports having diabetic eye exam 6/1/22 with [facility]. UTD urine microalbumin and diabetic foot exam 5/2/22       Pt feels well today and has no particular concern or complaint. ASSESSMENT/PLAN:  Below is the assessment and plan developed based on review of pertinent history, physical exam, labs, studies, and medications. 1. Type 2 diabetes mellitus without complication, without long-term current use of insulin (HCC)  Pt states that his fasting FSBS has been running 90's-low 100's range since last appt. Pt denies any low readings <60. Pt stopped his Metformin and Januvia on his own a few months ago as he reports being very committed to following heart healthy/diabetic/DASH diet and excercise plan. Pt down to 238 lbs. Pt's A1C was at goal of <7.0% at 6.2% on 11/22/22. Discussed with pt. Pt not interested in restarting his Metformin or Januvia at this time and having been off on these medications for a few months, his A1C was still at goal. I let the pt know that as long as her remains committed to following heart healthy/diabetic/DASH diet and excercise plan/weight loss that I will leave pt off of the Metformin and Januvia for time being. Pt agrees to continue monitoring his FSBS and bring readings to next appointment.  Will recheck fasting CMP/A1C prior to f/u with me in 3 months and restart medications if needed based on results. Will monitor.   - Comprehensive Metabolic Panel; Future  - Hemoglobin A1C; Future    2. Primary hypertension  Pt states that his BP has been running normal when he is checking it since last appt. Pt stopped his Lisinopril on his own a few months ago as he reports being very committed to following heart healthy/diabetic/DASH diet and excercise plan. Pt down to 238 lbs. Pt's BP was /80 in office today (goal <140/90). Discussed with pt. Pt not interested in restarting his Lisinopril at this time and having been off on these medication for a few months, his BP has been normal. I let the pt know that as long as her remains committed to following heart healthy/diabetic/DASH diet and excercise plan/weight loss that I will leave pt off of the Lisinopril for time being. Pt agrees to continue monitoring his BP and bring readings to next appointment. Pt to f/u with me in 3 months. Will monitor.   - Comprehensive Metabolic Panel; Future    3. Pure hypercholesterolemia  Pt stopped his Lipitor on his own a few months ago as he reports being very committed to following heart healthy/diabetic/DASH diet and excercise plan. Pt down to 238 lbs. Pt's fasting lipids looked good overall on 11/22/22. LDL-C was slightly above goal of <70 at 88.8. Discussed with pt. Pt not interested in restarting his Lipitor at this time. I let the pt know that as long as her remains committed to following heart healthy/diabetic/DASH diet and excercise plan/weight loss that I will leave pt off of the Lipitor for time being. Will recheck fasting lipid prior to f/u with me in 3 months. If not at goal, will consider restarting cholesterol lowering medication. Will monitor.   - Lipid Panel; Future    4. Atrial flutter, unspecified type ([facility] [address]  Pt had suspected atrial flutter on EKG in office prior to admission on 4/7/22.  Had resolved once he got to hospital. Denies any chest pain, palpitations, dizziness, or SOB since being released from [facility]. DONNELL FREEMAN [facility] Cardiology 4/20/22. Felt not actual atrial flutter on 4/7/22. Had NSR on EKG at Cardiology appointment. No HM ordered. Had echo scheduled for 5/26/22 due to HTN/DM2-but pt missed this appointment. Pt rescheduled and had echo 7/18/22 which was normal: Left Ventricle: Normal left ventricular systolic function with a visually estimated EF of 60 - 65%. Left ventricle size is normal. Normal wall thickness. Normal diastolic function. Pt encouraged to continue POC as laid out by [facility]. Pt to f/u with me in 3 months. Will monitor. 5. Umbilical hernia without obstruction and without gangrene  Small, nontender, reducible umbilical hernia unchanged on physical exam. Pt asymptomatic. Pt was previously referred to General Surgery for further evaluation. Saw them on 9/30/22 and had CT A/P w/o contrast on 10/14/22 that revealed small umbilical hernia, no bowel involvement. Pt never followed up with General Surgery. Pt given number to call to make f/u with them to discuss whether he will have the umbilical hernia repaired. Pt encouraged to follow their POC. Pt to f/u with me in 3 months. Will monitor.   - CBC with Auto Differential; Future  - Comprehensive Metabolic Panel; Future    6. Kidney stone  On CT A/P w/o contrast 10/14/22 pt had incidental finding of:   Small nonobstructing stone in the lower pole of the left   kidney. No hydronephrosis    Pt asymptomatic. Pt encouraged to let me know if he starts having any left flank pain or any hematuria so I can refer pt to PGU, but will hold off on this for now as pt is asymptomatic. Pt encouraged to stay well hydrated. Pt to f/u with me in 3 months. Will monitor.   - Comprehensive Metabolic Panel; Future    7. Screening for colon cancer  Colonoscopy UTD 11/21/22 with 1 year repeat in November 2023 due to poor prep and polyps. 8. Encounter for immunization  Pt UTD on Pneumonia and Tdap vax. Declined Covid, Shingrix, Flu vax today.  Will continue to encourage pt to get UTD on these at future appointments. 9. Laboratory exam ordered as part of routine general medical examination  Will recheck following fasting labs prior to f/u with me in 3 months. Will discuss results with pt at that appointment. - CBC with Auto Differential; Future  - Comprehensive Metabolic Panel; Future  - Lipid Panel; Future  - TSH with Reflex; Future      Return in about 3 months (around 2023) for recheck chronic conditions-fasting labs prior. Call sooner for concerns. SUBJECTIVE/OBJECTIVE:    HPI today-  Best Blanco (: 1965) is a 56 y.o. male, Established patient patient, here for evaluation of the following chief complaint(s):  Follow-up (Pt here today to recheck DM2, HTN, HLD. Pt states that his fasting FSBS has been running 90's-low 100's range since last appt. Pt denies any low readings <60. Pt stopped his Metformin, Januvia, Lisinopril, and Lipitor on his own a few months ago as he reports being very committed to following heart healthy/diabetic/DASH diet and excercise plan. Pt down to 238 lbs. Reports having diabetic eye exam 22 with [facility]. UTD urine microalbumin and diabetic foot exam 22//)  Pt feels well today and has no particular concern or complaint.      No Known Allergies  [unfilled]  Past Medical History:   Diagnosis Date    Diabetes ([facility] [address] 2022 new onset- Rx metformin (not taking) 2022 A1C was 10.0,  2022  A1C 6.2  2022- states fbs 100- denies hypoglycemic episodes    Essential hypertension     stopped lisinopril    History of 2019 novel coronavirus disease (COVID-19) 2022    denies hospitalization    Hyperlipidemia     stopped statin     Past Surgical History:   Procedure Laterality Date    COLONOSCOPY N/A 2022    COLONOSCOPY POLYPECTOMY SNARE performed by Camacho Betancourt MD at [address] Right     pins in hand     Family History   Problem Relation Age of Onset Hypertension Mother     Diabetes Mother     Hypertension Paternal Grandfather     Diabetes Paternal Grandfather     Hypertension Paternal Grandmother     Diabetes Paternal Grandmother     Hypertension Maternal Grandfather     Diabetes Maternal Grandfather     Hypertension Maternal Grandmother     Diabetes Maternal Grandmother     Hypertension Father     Diabetes Father      Social History     Tobacco Use   Smoking Status Never   Smokeless Tobacco Never         Review of Systems   Constitutional: Negative. Negative for appetite change, chills, diaphoresis, fatigue, fever and unexpected weight change. HENT: Negative. Negative for congestion, dental problem, drooling, ear discharge, ear pain, facial swelling, hearing loss, mouth sores, nosebleeds, postnasal drip, rhinorrhea, sinus pressure, sinus pain, sneezing, sore throat, tinnitus, trouble swallowing and voice change. Eyes: Negative. Negative for pain, discharge and visual disturbance. Respiratory: Negative. Negative for apnea, cough, choking, chest tightness, shortness of breath, wheezing and stridor. Cardiovascular: Negative. Negative for chest pain, palpitations and leg swelling. Gastrointestinal:  Negative for abdominal distention, abdominal pain, anal bleeding, blood in stool, constipation, diarrhea, nausea, rectal pain and vomiting. Small nontender reducible umbilical hernia-unchanged since last appt per pt. Asymptomatic   Endocrine: Negative. Negative for cold intolerance, heat intolerance, polydipsia, polyphagia and polyuria. Genitourinary: Negative. Negative for decreased urine volume, difficulty urinating, dysuria, flank pain, frequency, genital sores, hematuria, penile discharge, penile pain, penile swelling, scrotal swelling, testicular pain and urgency. Musculoskeletal: Negative. Negative for arthralgias, back pain, gait problem, joint swelling, myalgias, neck pain and neck stiffness. Skin: Negative.   Negative for color change, pallor, rash and wound. Allergic/Immunologic: Negative. Negative for environmental allergies. Neurological: Negative. Negative for dizziness, tremors, seizures, syncope, facial asymmetry, speech difficulty, weakness, light-headedness, numbness and headaches. Hematological: Negative. Negative for adenopathy. Does not bruise/bleed easily. Psychiatric/Behavioral: Negative. Negative for dysphoric mood, hallucinations, self-injury, sleep disturbance and suicidal ideas. The patient is not nervous/anxious. Vitals:    11/28/22 0918   BP: 118/80   Pulse: 60   SpO2: 98%       Physical Exam  Vitals reviewed. Constitutional:       General: He is not in acute distress. Appearance: Normal appearance. He is obese. He is not ill-appearing, toxic-appearing or diaphoretic. HENT:      Head: Normocephalic and atraumatic. Mouth/Throat:      Mouth: Mucous membranes are moist.   Eyes:      General: No scleral icterus. Extraocular Movements: Extraocular movements intact. Pupils: Pupils are equal, round, and reactive to light. Cardiovascular:      Rate and Rhythm: Normal rate and regular rhythm. Pulses: Normal pulses. Heart sounds: Normal heart sounds. No murmur heard. No friction rub. No gallop. Pulmonary:      Effort: Pulmonary effort is normal. No respiratory distress. Breath sounds: Normal breath sounds. No stridor. No wheezing, rhonchi or rales. Chest:      Chest wall: No tenderness. Abdominal:      General: Abdomen is flat. Bowel sounds are normal. There is no distension. Palpations: Abdomen is soft. There is no mass. Tenderness: There is no abdominal tenderness. There is no right CVA tenderness, left CVA tenderness, guarding or rebound. Hernia: A hernia (small nontender reducible umbilical hernia-unchanged since last appt. Asymptomatic) is present. Musculoskeletal:         General: No swelling, tenderness, deformity or signs of injury. Normal range of motion. Cervical back: Normal range of motion and neck supple. No rigidity or tenderness. Right lower leg: No edema. Left lower leg: No edema. Comments: Gait steady and unassisted   Lymphadenopathy:      Cervical: No cervical adenopathy. Skin:     General: Skin is warm. Capillary Refill: Capillary refill takes less than 2 seconds. Coloration: Skin is not jaundiced or pale. Findings: No bruising, erythema, lesion or rash. Neurological:      General: No focal deficit present. Mental Status: He is alert and oriented to person, place, and time. Cranial Nerves: No cranial nerve deficit. Sensory: No sensory deficit. Motor: No weakness. Coordination: Coordination normal.      Gait: Gait normal.   Psychiatric:         Mood and Affect: Mood normal.         Behavior: Behavior normal.         Thought Content:  Thought content normal.         Judgment: Judgment normal.     Component      Latest Ref Rng & Units 11/22/2022 8/24/2022          10:27 AM 11:44 AM   Hemoglobin A1C      4.8 - 5.6 % 6.2 (H) 6.2 (H)   eAG (mg/dL)      mg/dL 131 131     Component      Latest Ref Rng & Units 11/22/2022 8/24/2022          10:27 AM 11:44 AM   Sodium      133 - 143 mmol/L 142 141   Potassium      3.5 - 5.1 mmol/L 4.6 4.3   Chloride      101 - 110 mmol/L 108 110 (H)   CO2      21 - 32 mmol/L 29 29   Anion Gap      2 - 11 mmol/L 5 2 (L)   Glucose, Random      65 - 100 mg/dL 112 (H) 109 (H)   BUN,BUNPL      6 - 23 MG/DL 13 12   Creatinine      0.8 - 1.5 MG/DL 1.30 1.10   GFR African American      >60 ml/min/1.73m2  >60   GFR Non-African American      >60 ml/min/1.73m2  >60   CALCIUM, SERUM, 243065      8.3 - 10.4 MG/DL 9.8 9.2   Bilirubin      0.2 - 1.1 MG/DL 0.3 0.3   ALT      12 - 65 U/L 23 23   AST      15 - 37 U/L 14 (L) 12 (L)   Alk Phosphatase      50 - 136 U/L 67 60   Total Protein      6.3 - 8.2 g/dL 7.2 7.4   Albumin      3.5 - 5.0 g/dL 3.7 3.5   Globulin 2.8 - 4.5 g/dL 3.5 3.9 (H)   ALBUMIN/GLOBULIN RATIO      0.4 - 1.6   1.1 0.9 (L)   Est, Glom Filt Rate      >60 ml/min/1.73m2 >60      Component      Latest Ref Rng & Units 11/22/2022 8/24/2022          10:27 AM 11:44 AM   CHOLESTEROL, TOTAL, [id number]      <200 MG/ 171   Triglycerides      35 - 150 MG/DL 56 47   HDL Cholesterol      40 - 60 MG/DL 46 50   LDL Calculated      <100 MG/DL 88.8 111.6 (H)   VLDL Cholesterol Calculated      6.0 - 23.0 MG/DL 11.2 9.4   Chol/HDL Ratio       3.2 3.4     Component      Latest Ref Rng & Units 8/24/2022 5/2/2022 1/21/2022 1/21/2022          11:44 AM 11:19 AM 11:18 AM 11:18 AM   Microalbumin urine, POC      MG/L  negative     Microalb/Creat Ratio POC      <30 MG/G  <30     TSH      0.450 - 4.500 uIU/mL    0.883   HCV Ab      0.0 - 0.9 s/co ratio   <0.1    Prostatic Specific Ag      <4.0 ng/mL 0.7      CT ABDOMEN PELVIS WO CONTRAST Additional Contrast? Oral [[id number]]  Status: Final result     Order Providers    Authorizing Encounter Billing   Niko Gibbs MD SFS CT 1 Elbert Shannon MD            Signed by    Signed Date/Time Phone Pager   Wilmer Harmon 10/14/2022 10:10 [phone]      Reading Providers    Read Date Phone Pager   Wilmer Harmon Fri Oct 14, 2022 10:10 [phone]                CT ABDOMEN PELVIS WO CONTRAST Additional Contrast? Oral: Patient Communication     Add Comments   Seen         Routing History    Priority Sent On From To Message Type    10/14/2022 10:12 AM Luis Miguel, Tom Incoming Orders Results To Nohemi Yung MD Results               Radiation Dose Estimates    No radiation information found for this patient            Narrative   CT OF THE ABDOMEN AND PELVIS       INDICATION:  Ventral hernia, abdominal discomfort       Multiple axial images were obtained through the abdomen and pelvis without IV   contrast.  Oral contrast was used for bowel opacification. Radiation dose   reduction techniques were used for this study:   All CT scans performed at this   facility use one or all of the following: Automated exposure control, adjustment   of the mA and/or kVp according to patient's size, iterative reconstruction. COMPARISON: None       FINDINGS:   - LUNG BASES: No infiltrates or masses. - LIVER: 7 mm low-attenuation lesion in the lateral left lobe of the liver,   likely a cyst.  No significant liver mass. - GALLBLADDER/BILE DUCTS: No gallstones or bile duct dilatation.   - PANCREAS: Normal.   - SPLEEN: Normal.       - ADRENALS: Normal.   - KIDNEYS/URETERS: Small nonobstructing stone in the lower pole of the left   kidney. No hydronephrosis or significant mass. - BLADDER: Normal.   - REPRODUCTIVE ORGANS: No pelvic masses. - BOWEL: Normal caliber. No inflammatory changes. Appendix is normal in size.   - LYMPH NODES: No significant retroperitoneal, mesenteric, or pelvic adenopathy.   - BONES: No fracture or significant bone lesion.   - VASCULATURE: Normal   - OTHER: Small umbilical hernia, no bowel involvement. Impression   1. Small umbilical hernia, no bowel involvement. 2.  Small nonobstructing left kidney stone. PLEASE NOTE:  This document has been produced using voice recognition software. Unrecognized errors in transcription may be present. On this date 11/28/2022 I have spent 30 minutes reviewing previous notes, test results and face to face with the patient discussing the diagnosis and importance of compliance with the treatment plan as well as documenting on the day of the visit. An electronic signature was used to authenticate this note.   -- LUNA Magaña - NP

## 2022-12-09 ENCOUNTER — OFFICE VISIT (OUTPATIENT)
Dept: SURGERY | Age: 57
End: 2022-12-09
Payer: COMMERCIAL

## 2022-12-09 VITALS
HEART RATE: 67 BPM | BODY MASS INDEX: 35.52 KG/M2 | WEIGHT: 268 LBS | DIASTOLIC BLOOD PRESSURE: 76 MMHG | HEIGHT: 73 IN | SYSTOLIC BLOOD PRESSURE: 124 MMHG | OXYGEN SATURATION: 98 %

## 2022-12-09 DIAGNOSIS — E66.9 OBESITY (BMI 35.0-39.9 WITHOUT COMORBIDITY): ICD-10-CM

## 2022-12-09 DIAGNOSIS — K43.6 VENTRAL HERNIA WITH OBSTRUCTION AND WITHOUT GANGRENE: Primary | ICD-10-CM

## 2022-12-09 DIAGNOSIS — M62.08 DIASTASIS OF RECTUS ABDOMINIS: ICD-10-CM

## 2022-12-09 PROCEDURE — 99213 OFFICE O/P EST LOW 20 MIN: CPT | Performed by: SURGERY

## 2022-12-09 PROCEDURE — 3078F DIAST BP <80 MM HG: CPT | Performed by: SURGERY

## 2022-12-09 PROCEDURE — 3074F SYST BP LT 130 MM HG: CPT | Performed by: SURGERY

## 2022-12-09 NOTE — PROGRESS NOTES
[address], [address] [place] [address]  [phone]    Office Note   Philip Fernandez   MRN: [id number]     : 1965        HPI: Philip Fernandez is a 56 y.o. male who returns to the office following recent CT scan evaluation of his abdominal wall. He remains asymptomatic. CT scan shows a wide rectus diastases with a small fat-containing hernia defect within the rectus diastases. He also recently underwent colonoscopy where tubulovillous adenomas were removed and follow-up colonoscopy in 1 year is recommended by Dr. Elane Gitelman due to incomplete prep. He remains low risk for bowel event without repair and is a fairly significant risk for recurrence and need for mesh repair if he underwent repair. In 2022, he was referred to the office by Dorys Perdomo for evaluation of abdominal wall hernia. It has not caused him any symptoms. He denies prior abdominal surgeries or hernia repairs. It has been present for possibly 5 years and definitely more than 3 years. He has had some fluctuations in weight. He denies any bowel obstructive type symptoms. He is diabetic. He does not take insulin injections. He is not on any anticoagulation. He has had no imaging studies.     Past Medical History:   Diagnosis Date    Diabetes ([facility] [address] 2022 new onset- Rx metformin (not taking) 2022 A1C was 10.0,  2022  A1C 6.2  2022- states fbs 100- denies hypoglycemic episodes    Essential hypertension     stopped lisinopril    History of  novel coronavirus disease (COVID-19) 2022    denies hospitalization    Hyperlipidemia     stopped statin     Past Surgical History:   Procedure Laterality Date    COLONOSCOPY N/A 2022    COLONOSCOPY POLYPECTOMY SNARE performed by Rosy Cutler MD at [address] [facility] Right     pins in hand     Current Outpatient Medications   Medication Sig    Lancets MISC E11.65. CHECK GLUCOSE AC AND QHS. No current facility-administered medications for this visit. ALLERGIES:  Patient has no known allergies. Social History     Socioeconomic History    Marital status:      Spouse name: None    Number of children: None    Years of education: None    Highest education level: None   Tobacco Use    Smoking status: Never    Smokeless tobacco: Never   Vaping Use    Vaping Use: Never used   Substance and Sexual Activity    Alcohol use: Never    Drug use: Never     Social Determinants of Health     Financial Resource Strain: Low Risk     Difficulty of Paying Living Expenses: Not hard at all   Food Insecurity: No Food Insecurity    Worried About Running Out of Food in the Last Year: Never true    Ran Out of Food in the Last Year: Never true     Social History     Tobacco Use   Smoking Status Never   Smokeless Tobacco Never     Family History   Problem Relation Age of Onset    Hypertension Mother     Diabetes Mother     Hypertension Paternal Grandfather     Diabetes Paternal Grandfather     Hypertension Paternal Grandmother     Diabetes Paternal Grandmother     Hypertension Maternal Grandfather     Diabetes Maternal Grandfather     Hypertension Maternal Grandmother     Diabetes Maternal Grandmother     Hypertension Father     Diabetes Father        ROS: The patient has no difficulty with chest pain or shortness of breath. No fever or chills. The patient denies any personal or family history of abnormal clotting or bleeding. Comprehensive review of systems was otherwise unremarkable except as noted above. Physical Exam:   Constitutional: Alert oriented cooperative patient in no acute distress. /76   Pulse 67   Ht 6' 1\" (1.854 m)   Wt 268 lb (121.6 kg)   SpO2 98%   BMI 35.36 kg/m²   Eyes:Sclera are clear without icterus. ENMT: no obvious neck masses, no ear or lip lesions  CV: RRR. Normal perfusion  Resp: No JVD. Breathing is  non-labored.     GI: Obese, soft and non-distended, prominent bulge at umbilicus with thinning but intact skin. Soft bulging present consistent with incarcerated fatty tissue. Full reduction not attempted and defect approximately 12 mm in diameter. Surrounding wide rectus diastases of at least 8 to 10 cm. Debris around umbilicus  Musculoskeletal: unremarkable with normal function. Neuro:  No obvious focal deficits  Psychiatric: normal affect and mood, no memory impairment    Lab Results   Component Value Date/Time    WBC 12.1 04/07/2022 03:47 PM    HGB 15.0 04/07/2022 03:47 PM    HCT 47.3 04/07/2022 03:47 PM     04/07/2022 03:47 PM    MCV 81.4 04/07/2022 03:47 PM    MCV 82.8 01/21/2022 11:23 AM       Lab Results   Component Value Date     11/22/2022    K 4.6 11/22/2022     11/22/2022    CO2 29 11/22/2022    BUN 13 11/22/2022    CREATININE 1.30 11/22/2022    GLUCOSE 112 (H) 11/22/2022    CALCIUM 9.8 11/22/2022    PROT 7.2 11/22/2022    LABALBU 3.7 11/22/2022    BILITOT 0.3 11/22/2022    ALKPHOS 67 11/22/2022    AST 14 (L) 11/22/2022    ALT 23 11/22/2022    LABGLOM >60 11/22/2022    GFRAA >60 08/24/2022    AGRATIO 1.5 04/18/2022    GLOB 3.5 11/22/2022         No results found for: AMYLASE  No results found for: LIPASE     CT Result (most recent):  CT ABDOMEN PELVIS WO IV CONTRAST 10/14/2022    Narrative  CT OF THE ABDOMEN AND PELVIS    INDICATION:  Ventral hernia, abdominal discomfort    Multiple axial images were obtained through the abdomen and pelvis without IV  contrast.  Oral contrast was used for bowel opacification. Radiation dose  reduction techniques were used for this study: All CT scans performed at this  facility use one or all of the following: Automated exposure control, adjustment  of the mA and/or kVp according to patient's size, iterative reconstruction. COMPARISON: None    FINDINGS:  - LUNG BASES: No infiltrates or masses.     - LIVER: 7 mm low-attenuation lesion in the lateral left lobe of the liver,  likely a cyst.  No significant liver mass. - GALLBLADDER/BILE DUCTS: No gallstones or bile duct dilatation.  - PANCREAS: Normal.  - SPLEEN: Normal.    - ADRENALS: Normal.  - KIDNEYS/URETERS: Small nonobstructing stone in the lower pole of the left  kidney. No hydronephrosis or significant mass. - BLADDER: Normal.  - REPRODUCTIVE ORGANS: No pelvic masses. - BOWEL: Normal caliber. No inflammatory changes. Appendix is normal in size.  - LYMPH NODES: No significant retroperitoneal, mesenteric, or pelvic adenopathy.  - BONES: No fracture or significant bone lesion.  - VASCULATURE: Normal  - OTHER: Small umbilical hernia, no bowel involvement. Impression  1. Small umbilical hernia, no bowel involvement. 2.  Small nonobstructing left kidney stone. Assessment/Plan:     Claryce Nyhan is a 56 y.o. male who presents with an asymptomatic periumbilical hernia within a wide rectus diastases. The hernia contains incarcerated fatty tissue that I did not try to reduce completely. The defect is less than 15 mm. He has had no prior incisions or attempts at repair. He has had some weight fluctuations in the past which has led to his rectus diastases. We discussed that this may or may not require repair depending on evaluation of potential bowel risk. The wide rectus diastases with this defect would require the use of prosthetic material to widely bridge the defect and the diastases for any durability. Though I use primarily nonpermanent mesh, the need for permanent mesh is sometimes required which carries a 20% bowel risk to the patient in their lifetime. The recurrence rate for repair is also in the 50% range and the recurrences are more dangerous than the initial hernias. We will obtain a CT scan to evaluate his abdominal wall. We did discuss that the thinning of the skin over the umbilicus may end up being an indication for repair.   I will see him back in several weeks following the CT scan. I doubt that he will get into any trouble in the interim. He also understands that continued weight gain may enlarge the hernia defect as well as it would put a stress on any repair. There are no exercises that we will lessen a rectus diastases. I will see him back in 4 weeks following the CT scan. She returns to the office on 12/9/2022, following his CT scan evaluation. He has a small periumbilical hernia within a wide rectus diastases. It contains incarcerated fat and has remained asymptomatic. He also recently had colonoscopy with tubulovillous adenomas removed. He had multiple polyps and 1 year follow-up was recommended. All things considered, he is not ideal for a hernia repair which would complicate any future colon surgery if required. He does not have symptoms from his hernia and this appears to be a low risk for bowel event. He would require a large mesh repair due to the wide rectus diastases which carries its own risk. We agreed to follow him up and discussed potential events that would signal a bowel incarceration event that would require visit to the emergency department. Otherwise I expect that he will not have many issues with continued observation. I will see him back in 1 year.     Patient Active Problem List    Diagnosis Date Noted    Kidney stone 11/28/2022     Priority: Medium    Ventral hernia with obstruction and without gangrene 09/30/2022     Priority: Medium    Diastasis of rectus abdominis 09/30/2022     Priority: Medium    Encounter for screening colonoscopy 11/15/2022     Added automatically from request for surgery [id number]      Abnormal EKG 90/01/8165    Umbilical hernia without obstruction and without gangrene 04/18/2022    HTN (hypertension) 04/10/2022    New onset type 2 diabetes mellitus ([address] 04/07/2022    Elevated fasting glucose 01/24/2022    Hyperlipidemia 01/24/2022    Obesity (BMI 35.0-39.9 without comorbidity) 01/21/2022        Level of MDM (Based on 2/3 elements below)  Number and Complexity of Problems Addressed Amount and/or Complexity of Data to be Reviewed and Analyzed  *Each unique test, order, or document contributes to the combination of 2 or combination of 3 in Category 1 below. Risk of Complications and/or Morbidity or Mortality of pt Management     55942  53032 SF Minimal  ?1self-limited or minor problem Minimal or none Minimal risk of morbidity from additional diagnostic testing or Rx   89695  11745 Low Low  ? 2or more self-limited or minor problems;    or  ? 1stable chronic illness;    or  ?1acute, uncomplicated illness or injury   Limited  (Must meet the requirements of at least 1 of the 2 categories)  Category 1: Tests and documents   ? Any combination of 2 from the following:  ?Review of prior external note(s) from each unique source*;  ?review of the result(s) of each unique test*;   ?ordering of each unique test*    or   Category 2: Assessment requiring an independent historian(s)  (For the categories of independent interpretation of tests and discussion of management or test interpretation, see moderate or high) Low risk of morbidity from additional diagnostic testing or treatment     19139  21566 Mod Moderate  ? 1or more chronic illnesses with exacerbation, progression, or side effects of treatment;    or  ?2or more stable chronic illnesses;    or      1undiagnosed new problem with uncertain prognosis;    or  ?1acute illness with systemic symptoms;    or  ?1acute complicated injury   Moderate:  (Must meet the requirements of 1 out of 3 categories)    Category 1: Tests, documents, or independent historian(s)  ? Any combination of 3 from the following:   ?Review of prior external note(s) from each unique source*;  ?Review of the result(s) of each unique test*;  ?Ordering of each unique test*;  ?Assessment requiring an independent historian(s)    or  Category 2: Independent interpretation of tests   ? Independent interpretation of a test performed by another physician/other qualified health care professional (not separately reported);     or  Category 3: Discussion of management or test interpretation  ? Discussion of management or test interpretation with external physician/other qualified health care professional/appropriate source (not separately reported)   Moderate risk of morbidity from additional diagnostic testing or treatment  Examples only: ? ? Prescription drug management - including advanced wound care prescription wound care products  (eg Iodosorb, hydrofera, silvadene, collagen, puracol plus, optiloc, biologics - placenta, Theraskin, Apligraf). Note also that if home health care is involved, they will not apply topical therapies without a prescription/order from physician    ? Decision regarding minor surgery with identified patient or procedure risk factors  ? Decision regarding elective major surgery without identified patient or procedure risk factors   ? Diagnosis or treatment significantly limited by social determinants of health       45185  38941 High High  ? 1or more chronic illnesses with severe exacerbation, progression, or side effects of treatment;    or  ?1 acute or chronic illness or injury that poses a threat to life or bodily function   Extensive  (Must meet the requirements of at least 2 out of 3 categories)    Category 1: Tests, documents, or independent historian(s)  ? Any combination of 3 from the following:   ?Review of prior external note(s) from each unique source*;  ?Review of the result(s) of each unique test*;   ?Ordering of each unique test*;   ?Assessment requiring an independent historian(s)    or   Category 2: Independent interpretation of tests   ? Independent interpretation of a test performed by another physician/other qualified health care professional (not separately reported);     or  Category 3: Discussion of management or test interpretation  ? Discussion of management or test interpretation with external physician/other qualified health care professional/appropriate source (not separately reported)   High risk of morbidity from additional diagnostic testing or treatment  Examples only:  ?Drug therapy requiring intensive monitoring for toxicity  ? Decision regarding elective major surgery with identified patient or procedure risk factors  ? Decision regarding emergency major surgery  ? Decision regarding hospitalization  ? Decision not to resuscitate or to de-escalate care because of poor prognosis               Angelica Sarkar MD,  FACS

## 2022-12-15 PROBLEM — Z12.11 ENCOUNTER FOR SCREENING COLONOSCOPY: Status: RESOLVED | Noted: 2022-11-15 | Resolved: 2022-12-15

## 2022-12-19 ENCOUNTER — TELEPHONE (OUTPATIENT)
Dept: GASTROENTEROLOGY | Age: 57
End: 2022-12-19

## 2022-12-19 NOTE — TELEPHONE ENCOUNTER
The colonoscopy and pathology were given patient will need a repeat colonoscopy with a better prep in a year

## 2023-03-08 ENCOUNTER — NURSE ONLY (OUTPATIENT)
Dept: FAMILY MEDICINE CLINIC | Facility: CLINIC | Age: 58
End: 2023-03-08

## 2023-03-08 DIAGNOSIS — E78.00 PURE HYPERCHOLESTEROLEMIA: ICD-10-CM

## 2023-03-08 DIAGNOSIS — I10 PRIMARY HYPERTENSION: ICD-10-CM

## 2023-03-08 DIAGNOSIS — N20.0 KIDNEY STONE: ICD-10-CM

## 2023-03-08 DIAGNOSIS — K42.9 UMBILICAL HERNIA WITHOUT OBSTRUCTION AND WITHOUT GANGRENE: ICD-10-CM

## 2023-03-08 DIAGNOSIS — Z00.00 LABORATORY EXAM ORDERED AS PART OF ROUTINE GENERAL MEDICAL EXAMINATION: ICD-10-CM

## 2023-03-08 DIAGNOSIS — E11.9 TYPE 2 DIABETES MELLITUS WITHOUT COMPLICATION, WITHOUT LONG-TERM CURRENT USE OF INSULIN (HCC): ICD-10-CM

## 2023-03-08 LAB
BASOPHILS # BLD: 0 K/UL (ref 0–0.2)
BASOPHILS NFR BLD: 1 % (ref 0–2)
DIFFERENTIAL METHOD BLD: ABNORMAL
EOSINOPHIL # BLD: 0.1 K/UL (ref 0–0.8)
EOSINOPHIL NFR BLD: 1 % (ref 0.5–7.8)
ERYTHROCYTE [DISTWIDTH] IN BLOOD BY AUTOMATED COUNT: 15 % (ref 11.9–14.6)
HCT VFR BLD AUTO: 45.8 % (ref 41.1–50.3)
HGB BLD-MCNC: 14.2 G/DL (ref 13.6–17.2)
IMM GRANULOCYTES # BLD AUTO: 0 K/UL (ref 0–0.5)
IMM GRANULOCYTES NFR BLD AUTO: 0 % (ref 0–5)
LYMPHOCYTES # BLD: 4 K/UL (ref 0.5–4.6)
LYMPHOCYTES NFR BLD: 48 % (ref 13–44)
MCH RBC QN AUTO: 26 PG (ref 26.1–32.9)
MCHC RBC AUTO-ENTMCNC: 31 G/DL (ref 31.4–35)
MCV RBC AUTO: 83.7 FL (ref 82–102)
MONOCYTES # BLD: 0.5 K/UL (ref 0.1–1.3)
MONOCYTES NFR BLD: 6 % (ref 4–12)
NEUTS SEG # BLD: 3.8 K/UL (ref 1.7–8.2)
NEUTS SEG NFR BLD: 45 % (ref 43–78)
NRBC # BLD: 0 K/UL (ref 0–0.2)
PLATELET # BLD AUTO: 307 K/UL (ref 150–450)
PMV BLD AUTO: 10.9 FL (ref 9.4–12.3)
RBC # BLD AUTO: 5.47 M/UL (ref 4.23–5.6)
WBC # BLD AUTO: 8.4 K/UL (ref 4.3–11.1)

## 2023-03-09 LAB
ALBUMIN SERPL-MCNC: 3.7 G/DL (ref 3.5–5)
ALBUMIN/GLOB SERPL: 0.9 (ref 0.4–1.6)
ALP SERPL-CCNC: 68 U/L (ref 50–136)
ALT SERPL-CCNC: 21 U/L (ref 12–65)
ANION GAP SERPL CALC-SCNC: 6 MMOL/L (ref 2–11)
AST SERPL-CCNC: 16 U/L (ref 15–37)
BILIRUB SERPL-MCNC: 0.4 MG/DL (ref 0.2–1.1)
BUN SERPL-MCNC: 14 MG/DL (ref 6–23)
CALCIUM SERPL-MCNC: 9.3 MG/DL (ref 8.3–10.4)
CHLORIDE SERPL-SCNC: 110 MMOL/L (ref 101–110)
CHOLEST SERPL-MCNC: 167 MG/DL
CO2 SERPL-SCNC: 24 MMOL/L (ref 21–32)
CREAT SERPL-MCNC: 1.2 MG/DL (ref 0.8–1.5)
EST. AVERAGE GLUCOSE BLD GHB EST-MCNC: 134 MG/DL
GLOBULIN SER CALC-MCNC: 4.2 G/DL (ref 2.8–4.5)
GLUCOSE SERPL-MCNC: 124 MG/DL (ref 65–100)
HBA1C MFR BLD: 6.3 % (ref 4.8–5.6)
HDLC SERPL-MCNC: 59 MG/DL (ref 40–60)
HDLC SERPL: 2.8
LDLC SERPL CALC-MCNC: 98 MG/DL
POTASSIUM SERPL-SCNC: 4.1 MMOL/L (ref 3.5–5.1)
PROT SERPL-MCNC: 7.9 G/DL (ref 6.3–8.2)
SODIUM SERPL-SCNC: 140 MMOL/L (ref 133–143)
TRIGL SERPL-MCNC: 50 MG/DL (ref 35–150)
TSH W FREE THYROID IF ABNORMAL: 1.32 UIU/ML (ref 0.36–3.74)
VLDLC SERPL CALC-MCNC: 10 MG/DL (ref 6–23)

## 2023-03-15 ENCOUNTER — OFFICE VISIT (OUTPATIENT)
Dept: FAMILY MEDICINE CLINIC | Facility: CLINIC | Age: 58
End: 2023-03-15
Payer: COMMERCIAL

## 2023-03-15 VITALS
DIASTOLIC BLOOD PRESSURE: 74 MMHG | HEART RATE: 68 BPM | SYSTOLIC BLOOD PRESSURE: 134 MMHG | BODY MASS INDEX: 35.39 KG/M2 | OXYGEN SATURATION: 98 % | RESPIRATION RATE: 18 BRPM | WEIGHT: 267 LBS | HEIGHT: 73 IN

## 2023-03-15 DIAGNOSIS — I48.92 ATRIAL FLUTTER, UNSPECIFIED TYPE (HCC): ICD-10-CM

## 2023-03-15 DIAGNOSIS — N20.0 KIDNEY STONE: ICD-10-CM

## 2023-03-15 DIAGNOSIS — K42.9 UMBILICAL HERNIA WITHOUT OBSTRUCTION AND WITHOUT GANGRENE: ICD-10-CM

## 2023-03-15 DIAGNOSIS — Z23 ENCOUNTER FOR IMMUNIZATION: ICD-10-CM

## 2023-03-15 DIAGNOSIS — Z12.11 SCREENING FOR COLON CANCER: ICD-10-CM

## 2023-03-15 DIAGNOSIS — E11.9 TYPE 2 DIABETES MELLITUS WITHOUT COMPLICATION, WITHOUT LONG-TERM CURRENT USE OF INSULIN (HCC): Primary | ICD-10-CM

## 2023-03-15 DIAGNOSIS — I10 PRIMARY HYPERTENSION: ICD-10-CM

## 2023-03-15 DIAGNOSIS — E78.00 PURE HYPERCHOLESTEROLEMIA: ICD-10-CM

## 2023-03-15 PROCEDURE — 3078F DIAST BP <80 MM HG: CPT | Performed by: NURSE PRACTITIONER

## 2023-03-15 PROCEDURE — 3075F SYST BP GE 130 - 139MM HG: CPT | Performed by: NURSE PRACTITIONER

## 2023-03-15 PROCEDURE — 3044F HG A1C LEVEL LT 7.0%: CPT | Performed by: NURSE PRACTITIONER

## 2023-03-15 PROCEDURE — 99214 OFFICE O/P EST MOD 30 MIN: CPT | Performed by: NURSE PRACTITIONER

## 2023-03-15 RX ORDER — EZETIMIBE 10 MG/1
10 TABLET ORAL DAILY
Qty: 30 TABLET | Refills: 5 | Status: SHIPPED | OUTPATIENT
Start: 2023-03-15

## 2023-03-15 ASSESSMENT — ENCOUNTER SYMPTOMS
EYE PAIN: 0
STRIDOR: 0
FACIAL SWELLING: 0
SORE THROAT: 0
SHORTNESS OF BREATH: 0
WHEEZING: 0
RHINORRHEA: 0
CONSTIPATION: 0
DIARRHEA: 0
CHOKING: 0
CHEST TIGHTNESS: 0
EYES NEGATIVE: 1
TROUBLE SWALLOWING: 0
EYE DISCHARGE: 0
BLOOD IN STOOL: 0
VOICE CHANGE: 0
COLOR CHANGE: 0
ABDOMINAL DISTENTION: 0
SINUS PRESSURE: 0
ALLERGIC/IMMUNOLOGIC NEGATIVE: 1
ANAL BLEEDING: 0
BACK PAIN: 0
APNEA: 0
RECTAL PAIN: 0
ABDOMINAL PAIN: 0
RESPIRATORY NEGATIVE: 1
SINUS PAIN: 0
NAUSEA: 0
COUGH: 0
VOMITING: 0

## 2023-03-15 NOTE — PROGRESS NOTES
[address] [facility] [address] [facility]  Phone: [phone] Fax [phone]  Pooja Jordan. Dwayne MS, APRN, FNP-C  3/15/2023  Chief Complaint   Patient presents with    Follow-up     Pt here today to recheck chronic conditions. Pt reports following POC as previously directed. Please see ROS/Assessment and Plan section for full details of all items addressed during today's appointment. Pt reports feeling well today and has no particular concerns or complaints. ASSESSMENT/PLAN:  Below is the assessment and plan developed based on review of pertinent history, physical exam, labs, studies, and medications. 1. Type 2 diabetes mellitus without complication, without long-term current use of insulin (HCC)  Pt states that his FSBS has been 110's-120's on average since last appointment when he is checking it. Pt is no longer on diabetes medication as he has been very committed to following heart healthy/diabetic/DASH diet and excercise plan. On 3/8/23, pt's A1C remained well controlled <7.0% at 6.3% Discussed with pt. Pt encouraged to remain committed to following heart healthy/diabetic/DASH diet and excercise plan/weight loss and I will continue to leave him off of diabetes medication for time being. Pt agrees to continue monitoring his FSBS and bring readings to next appointment. Will recheck fasting CMP/A1C prior to f/u with me in 3 months. Will also recheck urine microalbumin prior to that appointment as well and conduct diabetic foot exam at next appoint as pt will be due. Pt plans to have repeat diabetic eye exam with [facility] in June when it is due. Will monitor.   - Comprehensive Metabolic Panel; Future  - Hemoglobin A1C; Future  - AMB POC URINE, MICROALBUMIN    2. Pure hypercholesterolemia  Pt is no longer on cholesterol lowering medication as he has been very committed to following heart healthy/diabetic/DASH diet and excercise plan.  On 3/8/23, pt's Triglycerides were WNL 50. HDL was WNL at 59. LDL-C was 98. Goal with DM2 is <70. Discussed with pt. Pt encouraged to remain committed to following heart healthy/diabetic/DASH diet and excercise plan/weight loss and I will leave him off of statin for time being but add Zetia 10 mg po daily to help get him to LDL-C goal. Will recheck fasting lipids prior to  f/u with me in 3 months. Will monitor  - ezetimibe (ZETIA) 10 MG tablet; Take 1 tablet by mouth daily  Dispense: 30 tablet; Refill: 5  - Lipid Panel; Future    3. Primary hypertension  Pt states that his BP has been running normal when he is checking it since last appt. Pt  is no longer on BP medication as he has been very committed to following heart healthy/diabetic/DASH diet and excercise plan. Pt's BP was /74 in office today (goal <140/90). Discussed with pt. Pt encouraged to remain committed to following heart healthy/diabetic/DASH diet and excercise plan/weight loss and I will continue to leave him off of BP medication for time being. Pt agrees to continue monitoring his BP and bring readings to next appointment. Pt to f/u with me in 3 months. Will monitor.   - Comprehensive Metabolic Panel; Future    4. Umbilical hernia without obstruction and without gangrene  Pt was previously referred to General Surgery for further evaluation. Saw them on 9/30/22 and had CT A/P w/o contrast on 10/14/22 that revealed small umbilical hernia, no bowel involvement. Pt noted to have a small nontender reducible umbilical hernia-pt states that it has improved since last appt due to pt's healthier lifestyle leading to healthy weight loss. Pt saw General Surgery 12/9/22 and opted for observation for now instead of surgery. Pt encouraged to follow POC as laid out by General Surgery and let myself or General Surgery know if he develops any new/worsening symptoms related to his umbilical hernia, but it is asymptomatic at this time. Pt to f/u with me in 3 months. Will monitor.      5. Kidney stone  On CT A/P w/o contrast 10/14/22 pt had incidental finding of:   Small nonobstructing stone in the lower pole of the left   kidney. No hydronephrosis    Pt asymptomatic. Pt encouraged to let me know if he starts having any left flank pain or any hematuria so I can refer pt to PGU, but will hold off on this for now as pt is asymptomatic. Pt encouraged to stay well hydrated. Pt to f/u with me in 3 months. Will monitor. 6. Atrial flutter, unspecified type ([address]  Pt had suspected atrial flutter on EKG in office prior to admission on 22. Had resolved once he got to hospital. Denies any chest pain, palpitations, dizziness, or SOB since being released from hospital. DONNELL FREEMAN [facility] 22. Felt not actual atrial flutter on 22. Had NSR on EKG at Cardiology appointment. No HM ordered. Had echo scheduled for 22 due to HTN/DM2-but pt missed this appointment. Pt rescheduled and had echo 22 which was normal: Left Ventricle: Normal left ventricular systolic function with a visually estimated EF of 60 - 65%. Left ventricle size is normal. Normal wall thickness. Normal diastolic function. Pt encouraged to continue POC as laid out by [facility] Cardiology. Pt to f/u with me in 3 months. Will monitor. 7. Screening for colon cancer  Colonoscopy UTD 22 with 1 year repeat in 2023 due to poor prep and polyps. 8. Encounter for immunization  Pt UTD on Pneumonia and Tdap vax. Declined Covid, Shingrix, Flu vax today. Will continue to encourage pt to get UTD on these at future appointments. Return in about 3 months (around 6/15/2023) for To recheck chronic conditions (fasting labs prior). Call sooner for concerns. SUBJECTIVE/OBJECTIVE:    HPI 22-  Damaso Foote (: 1965) is a 56 y.o. male, Established patient patient, here for evaluation of the following chief complaint(s):  Follow-up (Pt here today to recheck DM2, HTN, HLD.  Pt states that his fasting FSBS has been running 90's-low 100's range since last appt. Pt denies any low readings <60. Pt stopped his Metformin, Januvia, Lisinopril, and Lipitor on his own a few months ago as he reports being very committed to following heart healthy/diabetic/DASH diet and excercise plan. Pt down to 238 lbs. Reports having diabetic eye exam 22 with [facility]. UTD urine microalbumin and diabetic foot exam 22//)  Pt feels well today and has no particular concern or complaint. HPI today-  Chip Bowen (: 1965) is a 66 y.o. male, Established patient patient, here for evaluation of the following chief complaint(s):  Follow-up (Pt here today to recheck chronic conditions. Pt reports following POC as previously directed. Please see ROS/Assessment and Plan section for full details of all items addressed during today's appointment.  Pt reports feeling well today and has no particular concerns or complaints. )     No Known Allergies  [unfilled]  Past Medical History:   Diagnosis Date    Diabetes ([address] 2022 new onset- Rx metformin (not taking) 2022 A1C was 10.0,  2022  A1C 6.2  2022- states fbs 100- denies hypoglycemic episodes    Essential hypertension     stopped lisinopril    History of 2019 novel coronavirus disease (COVID-19) 2022    denies hospitalization    Hyperlipidemia     stopped statin     Past Surgical History:   Procedure Laterality Date    COLONOSCOPY N/A 2022    COLONOSCOPY POLYPECTOMY SNARE performed by Joelle Herrera MD at [address] Right     pins in hand     Family History   Problem Relation Age of Onset    Hypertension Mother     Diabetes Mother     Hypertension Paternal Grandfather     Diabetes Paternal Grandfather     Hypertension Paternal Grandmother     Diabetes Paternal Grandmother     Hypertension Maternal Grandfather     Diabetes Maternal Grandfather     Hypertension Maternal Grandmother     Diabetes Maternal Grandmother     Hypertension Father     Diabetes Father      Social History     Tobacco Use   Smoking Status Never   Smokeless Tobacco Never         Review of Systems   Constitutional: Negative. Negative for appetite change, chills, diaphoresis, fatigue, fever and unexpected weight change. HENT: Negative. Negative for congestion, dental problem, drooling, ear discharge, ear pain, facial swelling, hearing loss, mouth sores, nosebleeds, postnasal drip, rhinorrhea, sinus pressure, sinus pain, sneezing, sore throat, tinnitus, trouble swallowing and voice change. Eyes: Negative. Negative for pain, discharge and visual disturbance. Respiratory: Negative. Negative for apnea, cough, choking, chest tightness, shortness of breath, wheezing and stridor. Cardiovascular: Negative. Negative for chest pain, palpitations and leg swelling. Gastrointestinal:  Negative for abdominal distention, abdominal pain, anal bleeding, blood in stool, constipation, diarrhea, nausea, rectal pain and vomiting. Small nontender reducible umbilical hernia-pt states that it has improved since last appt due to pt's healthier lifestyle leading to healthy weight loss. Pt saw General Surgery 12/9/22 and opted for observation for now instead of surgery. Endocrine: Negative. Negative for cold intolerance, heat intolerance, polydipsia, polyphagia and polyuria. Genitourinary: Negative. Negative for decreased urine volume, difficulty urinating, dysuria, flank pain, frequency, genital sores, hematuria, penile discharge, penile pain, penile swelling, scrotal swelling, testicular pain and urgency. Musculoskeletal: Negative. Negative for arthralgias, back pain, gait problem, joint swelling, myalgias, neck pain and neck stiffness. Skin: Negative. Negative for color change, pallor, rash and wound. Allergic/Immunologic: Negative. Negative for environmental allergies. Neurological: Negative.   Negative for dizziness, tremors, seizures, syncope, facial asymmetry, speech difficulty, weakness, light-headedness, numbness and headaches. Hematological: Negative. Negative for adenopathy. Does not bruise/bleed easily. Psychiatric/Behavioral: Negative. Negative for dysphoric mood, hallucinations, self-injury, sleep disturbance and suicidal ideas. The patient is not nervous/anxious. Vitals:    03/15/23 0958   BP: 134/74   Pulse: 68   Resp: 18   SpO2: 98%       Physical Exam  Vitals reviewed. Constitutional:       General: He is not in acute distress. Appearance: Normal appearance. He is obese. He is not ill-appearing, toxic-appearing or diaphoretic. HENT:      Head: Normocephalic and atraumatic. Mouth/Throat:      Mouth: Mucous membranes are moist.   Eyes:      General: No scleral icterus. Extraocular Movements: Extraocular movements intact. Pupils: Pupils are equal, round, and reactive to light. Cardiovascular:      Rate and Rhythm: Normal rate and regular rhythm. Pulses: Normal pulses. Heart sounds: Normal heart sounds. No murmur heard. No friction rub. No gallop. Pulmonary:      Effort: Pulmonary effort is normal. No respiratory distress. Breath sounds: Normal breath sounds. No stridor. No wheezing, rhonchi or rales. Chest:      Chest wall: No tenderness. Abdominal:      General: Abdomen is flat. Bowel sounds are normal. There is no distension. Palpations: Abdomen is soft. There is no mass. Tenderness: There is no abdominal tenderness. There is no right CVA tenderness, left CVA tenderness, guarding or rebound. Hernia: A hernia (small nontender reducible umbilical hernia-asymptomatic; appears slightly improved over last appt) is present. Musculoskeletal:         General: No swelling, tenderness, deformity or signs of injury. Normal range of motion. Cervical back: Normal range of motion and neck supple. No rigidity or tenderness. Right lower leg: No edema. Left lower leg: No edema. Comments: Gait steady and unassisted   Lymphadenopathy:      Cervical: No cervical adenopathy. Skin:     General: Skin is warm. Capillary Refill: Capillary refill takes less than 2 seconds. Coloration: Skin is not jaundiced or pale. Findings: No bruising, erythema, lesion or rash. Neurological:      General: No focal deficit present. Mental Status: He is alert and oriented to person, place, and time. Cranial Nerves: No cranial nerve deficit. Sensory: No sensory deficit. Motor: No weakness. Coordination: Coordination normal.      Gait: Gait normal.   Psychiatric:         Mood and Affect: Mood normal.         Behavior: Behavior normal.         Thought Content:  Thought content normal.         Judgment: Judgment normal.       Component      Latest Ref Rng & Units 3/8/2023           9:40 AM   WBC      4.3 - 11.1 K/uL 8.4   RBC      4.23 - 5.6 M/uL 5.47   Hemoglobin Quant      13.6 - 17.2 g/dL 14.2   Hematocrit      41.1 - 50.3 % 45.8   MCV      82 - 102 FL 83.7   MCH      26.1 - 32.9 PG 26.0 (L)   MCHC      31.4 - 35.0 g/dL 31.0 (L)   RDW      11.9 - 14.6 % 15.0 (H)   Platelet Count      436 - 450 K/uL 307   MPV      9.4 - 12.3 FL 10.9   Nucleated Red Blood Cells      0.0 - 0.2 K/uL 0.00   Differential Type       AUTOMATED   Seg Neutrophils      43 - 78 % 45   Lymphocytes      13 - 44 % 48 (H)   Monocytes      4.0 - 12.0 % 6   Eosinophils %      0.5 - 7.8 % 1   Basophils      0.0 - 2.0 % 1   Immature Granulocytes      0.0 - 5.0 % 0   Segs Absolute      1.7 - 8.2 K/UL 3.8   Absolute Lymph #      0.5 - 4.6 K/UL 4.0   Absolute Mono #      0.1 - 1.3 K/UL 0.5   Absolute Eos #      0.0 - 0.8 K/UL 0.1   Basophils Absolute      0.0 - 0.2 K/UL 0.0   Absolute Immature Granulocyte      0.0 - 0.5 K/UL 0.0     Component      Latest Ref Rng & Units 3/8/2023           9:40 AM   Sodium      133 - 143 mmol/L 140   Potassium      3.5 - 5.1 mmol/L 4.1   Chloride   101 - 110 mmol/L 110   CO2      21 - 32 mmol/L 24   Anion Gap      2 - 11 mmol/L 6   Glucose, Random      65 - 100 mg/dL 124 (H)   BUN,BUNPL      6 - 23 MG/DL 14   Creatinine      0.8 - 1.5 MG/DL 1.20   Est, Glom Filt Rate      >60 ml/min/1.73m2 >60   CALCIUM, SERUM, 114007      8.3 - 10.4 MG/DL 9.3   BILIRUBIN TOTAL      0.2 - 1.1 MG/DL 0.4   ALT      12 - 65 U/L 21   AST      15 - 37 U/L 16   Alk Phosphatase      50 - 136 U/L 68   Total Protein      6.3 - 8.2 g/dL 7.9   Albumin      3.5 - 5.0 g/dL 3.7   Globulin      2.8 - 4.5 g/dL 4.2   ALBUMIN/GLOBULIN RATIO      0.4 - 1.6   0.9     Component      Latest Ref Rng & Units 3/8/2023 11/22/2022           9:40 AM 10:27 AM   CHOLESTEROL, TOTAL, [id number]      <200 MG/ 146   Triglycerides      35 - 150 MG/DL 50 56   HDL Cholesterol      40 - 60 MG/DL 59 46   LDL Calculated      <100 MG/DL 98 88.8   VLDL Cholesterol Calculated      6.0 - 23.0 MG/DL 10 11.2   Chol/HDL Ratio       2.8 3.2     Component      Latest Ref Rng & Units 3/8/2023 11/22/2022           9:40 AM 10:27 AM   Hemoglobin A1C      4.8 - 5.6 % 6.3 (H) 6.2 (H)   eAG (mg/dL)      mg/dL 134 131     Component      Latest Ref Rng & Units 3/8/2023           9:40 AM   TSH w Free Thyroid if Abnormal      0.358 - 3.740 UIU/ML 1.32     Component      Latest Ref Rng & Units 8/24/2022 5/2/2022          11:44 AM 11:19 AM   Microalbumin urine, POC      MG/L  negative   Microalb/Creat Ratio POC      <30 MG/G  <30   Prostatic Specific Ag      <4.0 ng/mL 0.7      PLEASE NOTE:  This document has been produced using voice recognition software. Unrecognized errors in transcription may be present.     On this date 3/15/2023 I have spent 30 minutes reviewing previous notes, test results and face to face with the patient discussing the diagnosis and importance of compliance with the treatment plan as well as documenting on the day of the visit.    An electronic signature was used to authenticate this note.  -- Khoa Yusuf  LUNA Rice - NP

## 2023-05-12 ENCOUNTER — TELEPHONE (OUTPATIENT)
Dept: DIABETES SERVICES | Age: 58
End: 2023-05-12

## 2023-05-12 NOTE — TELEPHONE ENCOUNTER
Type 2, Called about DME and let him know DME is now free at no cost. He would be interested in zoom. Took his information for zoom.  Will call once set up

## 2023-06-21 ENCOUNTER — OFFICE VISIT (OUTPATIENT)
Dept: FAMILY MEDICINE CLINIC | Facility: CLINIC | Age: 58
End: 2023-06-21
Payer: COMMERCIAL

## 2023-06-21 VITALS
RESPIRATION RATE: 16 BRPM | DIASTOLIC BLOOD PRESSURE: 70 MMHG | SYSTOLIC BLOOD PRESSURE: 120 MMHG | BODY MASS INDEX: 35.65 KG/M2 | HEART RATE: 59 BPM | OXYGEN SATURATION: 97 % | HEIGHT: 73 IN | WEIGHT: 269 LBS

## 2023-06-21 DIAGNOSIS — Z12.11 SCREENING FOR COLON CANCER: ICD-10-CM

## 2023-06-21 DIAGNOSIS — R80.9 URINE TEST POSITIVE FOR MICROALBUMINURIA: Primary | ICD-10-CM

## 2023-06-21 DIAGNOSIS — I10 PRIMARY HYPERTENSION: ICD-10-CM

## 2023-06-21 DIAGNOSIS — Z12.5 SPECIAL SCREENING FOR MALIGNANT NEOPLASM OF PROSTATE: ICD-10-CM

## 2023-06-21 DIAGNOSIS — N20.0 KIDNEY STONE: ICD-10-CM

## 2023-06-21 DIAGNOSIS — Z23 ENCOUNTER FOR IMMUNIZATION: ICD-10-CM

## 2023-06-21 DIAGNOSIS — E78.00 PURE HYPERCHOLESTEROLEMIA: ICD-10-CM

## 2023-06-21 DIAGNOSIS — I48.92 ATRIAL FLUTTER, UNSPECIFIED TYPE (HCC): ICD-10-CM

## 2023-06-21 DIAGNOSIS — E11.9 TYPE 2 DIABETES MELLITUS WITHOUT COMPLICATION, WITHOUT LONG-TERM CURRENT USE OF INSULIN (HCC): Primary | ICD-10-CM

## 2023-06-21 DIAGNOSIS — K42.9 UMBILICAL HERNIA WITHOUT OBSTRUCTION AND WITHOUT GANGRENE: ICD-10-CM

## 2023-06-21 PROBLEM — R73.01 ELEVATED FASTING GLUCOSE: Status: RESOLVED | Noted: 2022-01-24 | Resolved: 2023-06-21

## 2023-06-21 LAB
MICROALB/CREAT RATIO, POC: ABNORMAL
MICROALBUMIN URINE, POC: 50 MG/L

## 2023-06-21 PROCEDURE — 99214 OFFICE O/P EST MOD 30 MIN: CPT | Performed by: NURSE PRACTITIONER

## 2023-06-21 PROCEDURE — 3074F SYST BP LT 130 MM HG: CPT | Performed by: NURSE PRACTITIONER

## 2023-06-21 PROCEDURE — 3078F DIAST BP <80 MM HG: CPT | Performed by: NURSE PRACTITIONER

## 2023-06-21 PROCEDURE — 3044F HG A1C LEVEL LT 7.0%: CPT | Performed by: NURSE PRACTITIONER

## 2023-06-21 RX ORDER — LISINOPRIL 2.5 MG/1
2.5 TABLET ORAL DAILY
Qty: 90 TABLET | Refills: 1 | Status: SHIPPED | OUTPATIENT
Start: 2023-06-21

## 2023-06-21 RX ORDER — EZETIMIBE 10 MG/1
10 TABLET ORAL DAILY
Qty: 30 TABLET | Refills: 5 | Status: CANCELLED | OUTPATIENT
Start: 2023-06-21

## 2023-06-21 RX ORDER — PRAVASTATIN SODIUM 20 MG
20 TABLET ORAL DAILY
Qty: 90 TABLET | Refills: 1 | Status: SHIPPED | OUTPATIENT
Start: 2023-06-21

## 2023-06-21 SDOH — ECONOMIC STABILITY: HOUSING INSECURITY
IN THE LAST 12 MONTHS, WAS THERE A TIME WHEN YOU DID NOT HAVE A STEADY PLACE TO SLEEP OR SLEPT IN A SHELTER (INCLUDING NOW)?: NO

## 2023-06-21 SDOH — ECONOMIC STABILITY: FOOD INSECURITY: WITHIN THE PAST 12 MONTHS, YOU WORRIED THAT YOUR FOOD WOULD RUN OUT BEFORE YOU GOT MONEY TO BUY MORE.: NEVER TRUE

## 2023-06-21 SDOH — ECONOMIC STABILITY: INCOME INSECURITY: HOW HARD IS IT FOR YOU TO PAY FOR THE VERY BASICS LIKE FOOD, HOUSING, MEDICAL CARE, AND HEATING?: NOT HARD AT ALL

## 2023-06-21 SDOH — ECONOMIC STABILITY: FOOD INSECURITY: WITHIN THE PAST 12 MONTHS, THE FOOD YOU BOUGHT JUST DIDN'T LAST AND YOU DIDN'T HAVE MONEY TO GET MORE.: NEVER TRUE

## 2023-06-21 ASSESSMENT — ENCOUNTER SYMPTOMS
CHOKING: 0
STRIDOR: 0
FACIAL SWELLING: 0
EYE PAIN: 0
BLOOD IN STOOL: 0
SINUS PAIN: 0
DIARRHEA: 0
TROUBLE SWALLOWING: 0
NAUSEA: 0
WHEEZING: 0
ANAL BLEEDING: 0
RESPIRATORY NEGATIVE: 1
BACK PAIN: 0
SHORTNESS OF BREATH: 0
APNEA: 0
SORE THROAT: 0
RHINORRHEA: 0
RECTAL PAIN: 0
ABDOMINAL DISTENTION: 0
CHEST TIGHTNESS: 0
VOICE CHANGE: 0
ALLERGIC/IMMUNOLOGIC NEGATIVE: 1
EYE DISCHARGE: 0
CONSTIPATION: 0
ABDOMINAL PAIN: 0
EYES NEGATIVE: 1
COUGH: 0
COLOR CHANGE: 0
SINUS PRESSURE: 0
VOMITING: 0

## 2023-06-21 ASSESSMENT — PATIENT HEALTH QUESTIONNAIRE - PHQ9
SUM OF ALL RESPONSES TO PHQ QUESTIONS 1-9: 0
SUM OF ALL RESPONSES TO PHQ QUESTIONS 1-9: 0
1. LITTLE INTEREST OR PLEASURE IN DOING THINGS: 0
SUM OF ALL RESPONSES TO PHQ QUESTIONS 1-9: 0
SUM OF ALL RESPONSES TO PHQ9 QUESTIONS 1 & 2: 0
SUM OF ALL RESPONSES TO PHQ QUESTIONS 1-9: 0
2. FEELING DOWN, DEPRESSED OR HOPELESS: 0

## 2023-06-21 NOTE — PROGRESS NOTES
hypercholesterolemia  Pt reports taking Zetia 10 mg po daily as directed and has been very committed to following diabetic/heart healthy/DASH diet and excercise plan. On 6/12/23, pt's LDL-C worsened some to 115.4 (above goal of <70). Trigs were WNL 48. HDL was WNL 52. Discussed with pt. Pt encouraged to remain committed to following diabetic/heart healthy/DASH diet and excercise plan/weight loss. Pt is doing all that he can do but LDL-C is still not at goal even with Zetia. Will stop Zetia and start pt on Pravastatin 20 mg po daily. Will recheck fasting lipids prior to f/u with me in 3 months. Will monitor  - pravastatin (PRAVACHOL) 20 MG tablet; Take 1 tablet by mouth daily  Dispense: 90 tablet; Refill: 1  - Lipid Panel; Future    3. Primary hypertension  Pt states that his BP has been running normal when he is checking it since last appt. Pt is no longer on BP medication as he has been very committed to following heart diabetic/heart healthy/DASH diet and excercise plan. Pt's BP was /70 in office today (goal <140/90). Discussed with pt. Pt encouraged to remain committed to following diabetic/heart healthy/DASH diet and excercise plan/weight loss and I will continue to leave him off of BP medication for time being. Pt agrees to continue monitoring his BP and bring readings to next appointment. Pt to f/u with me in 3 months. Will monitor.   - Comprehensive Metabolic Panel; Future    4. Umbilical hernia without obstruction and without gangrene  Pt was previously referred to General Surgery for further evaluation. Saw them on 9/30/22 and had CT A/P w/o contrast on 10/14/22 that revealed small umbilical hernia, no bowel involvement. Pt noted to have a small nontender reducible umbilical hernia-pt states that it has improved since last appt due to pt's healthier lifestyle leading to healthy weight loss. Pt saw General Surgery 12/9/22 and opted for observation for now instead of surgery. Discussed with pt.   Pt

## 2023-09-11 ENCOUNTER — NURSE ONLY (OUTPATIENT)
Dept: FAMILY MEDICINE CLINIC | Facility: CLINIC | Age: 58
End: 2023-09-11

## 2023-09-11 DIAGNOSIS — N20.0 KIDNEY STONE: ICD-10-CM

## 2023-09-11 DIAGNOSIS — Z12.5 SPECIAL SCREENING FOR MALIGNANT NEOPLASM OF PROSTATE: ICD-10-CM

## 2023-09-11 DIAGNOSIS — I48.92 ATRIAL FLUTTER, UNSPECIFIED TYPE (HCC): ICD-10-CM

## 2023-09-11 DIAGNOSIS — E11.9 TYPE 2 DIABETES MELLITUS WITHOUT COMPLICATION, WITHOUT LONG-TERM CURRENT USE OF INSULIN (HCC): ICD-10-CM

## 2023-09-11 DIAGNOSIS — I10 PRIMARY HYPERTENSION: ICD-10-CM

## 2023-09-11 DIAGNOSIS — E78.00 PURE HYPERCHOLESTEROLEMIA: ICD-10-CM

## 2023-09-11 DIAGNOSIS — K42.9 UMBILICAL HERNIA WITHOUT OBSTRUCTION AND WITHOUT GANGRENE: ICD-10-CM

## 2023-09-11 LAB
ALBUMIN SERPL-MCNC: 3.9 G/DL (ref 3.5–5)
ALBUMIN/GLOB SERPL: 1 (ref 0.4–1.6)
ALP SERPL-CCNC: 70 U/L (ref 50–136)
ALT SERPL-CCNC: 26 U/L (ref 12–65)
ANION GAP SERPL CALC-SCNC: 7 MMOL/L (ref 2–11)
AST SERPL-CCNC: 19 U/L (ref 15–37)
BASOPHILS # BLD: 0.1 K/UL (ref 0–0.2)
BASOPHILS NFR BLD: 1 % (ref 0–2)
BILIRUB SERPL-MCNC: 0.4 MG/DL (ref 0.2–1.1)
BUN SERPL-MCNC: 12 MG/DL (ref 6–23)
CALCIUM SERPL-MCNC: 9.6 MG/DL (ref 8.3–10.4)
CHLORIDE SERPL-SCNC: 110 MMOL/L (ref 101–110)
CHOLEST SERPL-MCNC: 169 MG/DL
CO2 SERPL-SCNC: 27 MMOL/L (ref 21–32)
CREAT SERPL-MCNC: 1.2 MG/DL (ref 0.8–1.5)
DIFFERENTIAL METHOD BLD: ABNORMAL
EOSINOPHIL # BLD: 0.1 K/UL (ref 0–0.8)
EOSINOPHIL NFR BLD: 1 % (ref 0.5–7.8)
ERYTHROCYTE [DISTWIDTH] IN BLOOD BY AUTOMATED COUNT: 15.1 % (ref 11.9–14.6)
GLOBULIN SER CALC-MCNC: 3.8 G/DL (ref 2.8–4.5)
GLUCOSE SERPL-MCNC: 118 MG/DL (ref 65–100)
HCT VFR BLD AUTO: 45.4 % (ref 41.1–50.3)
HDLC SERPL-MCNC: 51 MG/DL (ref 40–60)
HDLC SERPL: 3.3
HGB BLD-MCNC: 14 G/DL (ref 13.6–17.2)
IMM GRANULOCYTES # BLD AUTO: 0 K/UL (ref 0–0.5)
IMM GRANULOCYTES NFR BLD AUTO: 0 % (ref 0–5)
LDLC SERPL CALC-MCNC: 103.8 MG/DL
LYMPHOCYTES # BLD: 4.5 K/UL (ref 0.5–4.6)
LYMPHOCYTES NFR BLD: 48 % (ref 13–44)
MCH RBC QN AUTO: 26.2 PG (ref 26.1–32.9)
MCHC RBC AUTO-ENTMCNC: 30.8 G/DL (ref 31.4–35)
MCV RBC AUTO: 85 FL (ref 82–102)
MONOCYTES # BLD: 0.7 K/UL (ref 0.1–1.3)
MONOCYTES NFR BLD: 8 % (ref 4–12)
NEUTS SEG # BLD: 4 K/UL (ref 1.7–8.2)
NEUTS SEG NFR BLD: 43 % (ref 43–78)
NRBC # BLD: 0 K/UL (ref 0–0.2)
PLATELET # BLD AUTO: 260 K/UL (ref 150–450)
PMV BLD AUTO: 11.4 FL (ref 9.4–12.3)
POTASSIUM SERPL-SCNC: 4.2 MMOL/L (ref 3.5–5.1)
PROT SERPL-MCNC: 7.7 G/DL (ref 6.3–8.2)
PSA SERPL-MCNC: 0.8 NG/ML
RBC # BLD AUTO: 5.34 M/UL (ref 4.23–5.6)
SODIUM SERPL-SCNC: 144 MMOL/L (ref 133–143)
TRIGL SERPL-MCNC: 71 MG/DL (ref 35–150)
TSH W FREE THYROID IF ABNORMAL: 1.35 UIU/ML (ref 0.36–3.74)
VLDLC SERPL CALC-MCNC: 14.2 MG/DL (ref 6–23)
WBC # BLD AUTO: 9.3 K/UL (ref 4.3–11.1)

## 2023-09-12 LAB
EST. AVERAGE GLUCOSE BLD GHB EST-MCNC: 137 MG/DL
HBA1C MFR BLD: 6.4 % (ref 4.8–5.6)

## 2023-09-22 ENCOUNTER — OFFICE VISIT (OUTPATIENT)
Dept: FAMILY MEDICINE CLINIC | Facility: CLINIC | Age: 58
End: 2023-09-22
Payer: COMMERCIAL

## 2023-09-22 VITALS
BODY MASS INDEX: 35.28 KG/M2 | SYSTOLIC BLOOD PRESSURE: 118 MMHG | HEART RATE: 71 BPM | HEIGHT: 73 IN | RESPIRATION RATE: 16 BRPM | WEIGHT: 266.2 LBS | OXYGEN SATURATION: 95 % | DIASTOLIC BLOOD PRESSURE: 85 MMHG

## 2023-09-22 DIAGNOSIS — I10 PRIMARY HYPERTENSION: ICD-10-CM

## 2023-09-22 DIAGNOSIS — Z12.11 SCREENING FOR COLON CANCER: ICD-10-CM

## 2023-09-22 DIAGNOSIS — N20.0 KIDNEY STONE: ICD-10-CM

## 2023-09-22 DIAGNOSIS — R80.9 URINE TEST POSITIVE FOR MICROALBUMINURIA: ICD-10-CM

## 2023-09-22 DIAGNOSIS — I48.92 ATRIAL FLUTTER, UNSPECIFIED TYPE (HCC): ICD-10-CM

## 2023-09-22 DIAGNOSIS — K42.9 UMBILICAL HERNIA WITHOUT OBSTRUCTION AND WITHOUT GANGRENE: ICD-10-CM

## 2023-09-22 DIAGNOSIS — Z23 ENCOUNTER FOR IMMUNIZATION: ICD-10-CM

## 2023-09-22 DIAGNOSIS — E78.00 PURE HYPERCHOLESTEROLEMIA: ICD-10-CM

## 2023-09-22 DIAGNOSIS — E11.9 TYPE 2 DIABETES MELLITUS WITHOUT COMPLICATION, WITHOUT LONG-TERM CURRENT USE OF INSULIN (HCC): Primary | ICD-10-CM

## 2023-09-22 PROCEDURE — 3074F SYST BP LT 130 MM HG: CPT | Performed by: NURSE PRACTITIONER

## 2023-09-22 PROCEDURE — 3079F DIAST BP 80-89 MM HG: CPT | Performed by: NURSE PRACTITIONER

## 2023-09-22 PROCEDURE — 99214 OFFICE O/P EST MOD 30 MIN: CPT | Performed by: NURSE PRACTITIONER

## 2023-09-22 PROCEDURE — 3044F HG A1C LEVEL LT 7.0%: CPT | Performed by: NURSE PRACTITIONER

## 2023-09-22 RX ORDER — LISINOPRIL 2.5 MG/1
2.5 TABLET ORAL DAILY
Qty: 90 TABLET | Refills: 1 | Status: SHIPPED | OUTPATIENT
Start: 2023-09-22

## 2023-09-22 RX ORDER — PRAVASTATIN SODIUM 40 MG
40 TABLET ORAL DAILY
Qty: 90 TABLET | Refills: 1 | Status: SHIPPED | OUTPATIENT
Start: 2023-09-22

## 2023-09-22 ASSESSMENT — ENCOUNTER SYMPTOMS
VOMITING: 0
STRIDOR: 0
VOICE CHANGE: 0
ALLERGIC/IMMUNOLOGIC NEGATIVE: 1
FACIAL SWELLING: 0
COUGH: 0
RHINORRHEA: 0
CHOKING: 0
BLOOD IN STOOL: 0
DIARRHEA: 0
ABDOMINAL DISTENTION: 0
EYE PAIN: 0
NAUSEA: 0
CHEST TIGHTNESS: 0
RESPIRATORY NEGATIVE: 1
BACK PAIN: 0
SINUS PAIN: 0
TROUBLE SWALLOWING: 0
EYE DISCHARGE: 0
ABDOMINAL PAIN: 0
EYES NEGATIVE: 1
CONSTIPATION: 0
SORE THROAT: 0
SHORTNESS OF BREATH: 0
ANAL BLEEDING: 0
WHEEZING: 0
SINUS PRESSURE: 0
COLOR CHANGE: 0
RECTAL PAIN: 0
APNEA: 0

## 2023-09-22 NOTE — PROGRESS NOTES
[address] [address], [address] [place] [address] Drive  Phone: [phone] Fax [phone]  Maryann Thacker. Dwayne MS, APRN, FNP-C  9/22/2023  Chief Complaint   Patient presents with    Follow-up     Pt here today for a f/u to recheck chronic conditions. Pt reports following POC/taking medications as previously directed. Please see ROS/Assessment and Plan section for full details of all items addressed during today's appointment. ASSESSMENT/PLAN:  Below is the assessment and plan developed based on review of pertinent history, physical exam, labs, studies, and medications. 1. Type 2 diabetes mellitus without complication, without long-term current use of insulin (HCC)  Pt is no longer on diabetes medication. Pt reports following diabetic/heart healthy/DASH diet and exercising as directed. On 9/11/23, pt's A1C was at goal of <7.0% at 6.4%. Discussed with pt. Will have pt continue diabetic/heart healthy/DASH diet and excercise and I will continue to leave him off of diabetes medication for time being. Will recheck fasting CMP/A1C prior to f/u with me in 6 months. Diabetic foot exam was WNL 6/21/23. Pt reports seeing [facility] for diabetic [facility] exam in June 2023. Records were previously requested so I can update HCM-still waiting on records. Will monitor.   - Comprehensive Metabolic Panel; Future  - Hemoglobin A1C; Future    2. Pure hypercholesterolemia  Pt reports taking Pravastatin 20 mg po daily and reports following diabetic/heart healthy/DASH diet and exercising as directed. On 9/11/23, pt's LDL-C was above goal of <70 at 103.8. Trigs were WNL 71. HDL was WNL 51. Discussed with pt. Will increase Pravastatin to 40 mg po daily and have pt continue diabetic/heart healthy/DASH diet and exercise. Will recheck fasting lipids prior to f/u with me in 6 months. Will monitor.   - Lipid Panel; Future  - pravastatin (PRAVACHOL) 40 MG tablet;  Take 1 tablet by mouth daily  Dispense: 90 tablet;

## 2023-12-15 ENCOUNTER — TELEPHONE (OUTPATIENT)
Dept: GASTROENTEROLOGY | Age: 58
End: 2023-12-15

## 2023-12-15 ENCOUNTER — PREP FOR PROCEDURE (OUTPATIENT)
Dept: GASTROENTEROLOGY | Age: 58
End: 2023-12-15

## 2023-12-15 DIAGNOSIS — Z12.11 ENCOUNTER FOR SCREENING COLONOSCOPY: ICD-10-CM

## 2023-12-15 NOTE — TELEPHONE ENCOUNTER
Called pt to schedule repeat colon in one year per Pari Gilliam note  Scheduled patient for  01/18/2024 [place] w/ Pari Gilliam

## 2023-12-18 RX ORDER — SODIUM CHLORIDE 0.9 % (FLUSH) 0.9 %
5-40 SYRINGE (ML) INJECTION PRN
Status: CANCELLED | OUTPATIENT
Start: 2023-12-18

## 2023-12-18 RX ORDER — SODIUM CHLORIDE 9 MG/ML
25 INJECTION, SOLUTION INTRAVENOUS PRN
Status: CANCELLED | OUTPATIENT
Start: 2023-12-18

## 2023-12-18 RX ORDER — SODIUM CHLORIDE 0.9 % (FLUSH) 0.9 %
5-40 SYRINGE (ML) INJECTION EVERY 12 HOURS SCHEDULED
Status: CANCELLED | OUTPATIENT
Start: 2023-12-18

## 2024-01-14 PROBLEM — Z12.11 ENCOUNTER FOR SCREENING COLONOSCOPY: Status: RESOLVED | Noted: 2023-12-15 | Resolved: 2024-01-14

## 2024-01-16 PROBLEM — Z12.11 ENCOUNTER FOR SCREENING COLONOSCOPY: Status: ACTIVE | Noted: 2023-12-15

## 2024-01-17 NOTE — PERIOP NOTE
Patient verified name, , and procedure.    Type: 1a; abbreviated assessment per anesthesia guidelines    Labs per anesthesia:     Instructed pt that they will be notified the day before their procedure by the GI Lab for time of arrival if their procedure is [place] and Pre-op for [place] cases. Arrival times should be called by 5 pm. If no phone is received the patient should contact their respective hospital. The GI lab telephone number is [phone] and ES Pre-op is [phone].     Follow diet and prep instructions per office including NPO status.  If patient has NOT received instructions from office patient is advised to call surgeon office, verbalizes understanding.    Bath or shower the night before and the am of surgery with non-moisturizing soap. No lotions, oils, powders, cologne on skin. No make up, eye make up or jewelry. Wear loose fitting comfortable, clean clothing.     Must have adult present in building the entire time .     Medications for the day of procedure none, patient to hold vitamins, supplements and NSAIDS now for surgery per anesthesia guidelines.     The following discharge instructions reviewed with patient: medication given during procedure may cause drowsiness for several hours, therefore, do not drive or operate machinery for remainder of the day. You may not drink alcohol on the day of your procedure, please resume regular diet and activity unless otherwise directed. You may experience abdominal distention for several hours that is relieved by the passage of gas. Contact your physician if you have any of the following: fever or chills, severe abdominal pain or excessive amount of bleeding or a large amount when having a bowel movement. Occasional specks of blood with bowel movement would not be unusual.

## 2024-01-17 NOTE — H&P
Hudson Jacobsen (:  1965) is a 58 y.o. male, new patient here for evaluation of the following chief complaint(s):  No chief complaint on file.         ASSESSMENT/PLAN: Screening colonoscopy history of colon polyps last colonoscopy  [unfilled]         Subjective   SUBJECTIVE/OBJECTIVE  Patient presented to arrange a screening colonoscopic evaluation history of colon polyps last colonoscopy was over a year ago however the preparation was poor.  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 ENDOSCOPY    ORTHOPEDIC SURGERY Right     pins in hand             No Known Allergies       Review of Systems   All other systems reviewed and are negative.             Objective   Physical Exam  HENT:      Head: Normocephalic.      Mouth/Throat:      Mouth: Mucous membranes are moist.   Eyes:      General: No scleral icterus.  Cardiovascular:      Rate and Rhythm: Normal rate and regular rhythm.   Pulmonary:      Effort: No respiratory distress.   Abdominal:      General: There is no distension.      Tenderness: There is no abdominal tenderness. There is no rebound.   Lymphadenopathy:      Cervical: No cervical adenopathy.   Skin:     Coloration: Skin is not jaundiced.      Findings: No bruising.   Neurological:      General: No focal deficit present.      Mental Status: He is alert.              No current facility-administered medications for this

## 2024-01-18 ENCOUNTER — HOSPITAL ENCOUNTER (OUTPATIENT)
Age: 59
Setting detail: OUTPATIENT SURGERY
Discharge: HOME | End: 2024-01-18
Attending: INTERNAL MEDICINE | Admitting: INTERNAL MEDICINE

## 2024-01-18 ENCOUNTER — ANESTHESIA (OUTPATIENT)
Dept: ENDOSCOPY | Age: 59
End: 2024-01-18

## 2024-01-18 ENCOUNTER — ANESTHESIA EVENT (OUTPATIENT)
Dept: ENDOSCOPY | Age: 59
End: 2024-01-18

## 2024-01-18 VITALS
OXYGEN SATURATION: 97 % | HEIGHT: 73 IN | DIASTOLIC BLOOD PRESSURE: 75 MMHG | BODY MASS INDEX: 34.46 KG/M2 | SYSTOLIC BLOOD PRESSURE: 116 MMHG | RESPIRATION RATE: 14 BRPM | TEMPERATURE: 97.8 F | WEIGHT: 260 LBS | HEART RATE: 52 BPM

## 2024-01-18 LAB
GLUCOSE BLD STRIP.AUTO-MCNC: 116 MG/DL (ref 65–100)
SERVICE CMNT-IMP: ABNORMAL

## 2024-01-18 PROCEDURE — 82962 GLUCOSE BLOOD TEST: CPT

## 2024-01-18 PROCEDURE — 2500000003 HC RX 250 WO HCPCS

## 2024-01-18 PROCEDURE — 88305 TISSUE EXAM BY PATHOLOGIST: CPT

## 2024-01-18 PROCEDURE — 7100000010 HC PHASE II RECOVERY - FIRST 15 MIN: Performed by: INTERNAL MEDICINE

## 2024-01-18 PROCEDURE — 2580000003 HC RX 258: Performed by: INTERNAL MEDICINE

## 2024-01-18 PROCEDURE — 7100000011 HC PHASE II RECOVERY - ADDTL 15 MIN: Performed by: INTERNAL MEDICINE

## 2024-01-18 PROCEDURE — 6360000002 HC RX W HCPCS

## 2024-01-18 PROCEDURE — 2709999900 HC NON-CHARGEABLE SUPPLY: Performed by: INTERNAL MEDICINE

## 2024-01-18 PROCEDURE — 3609010600 HC COLONOSCOPY POLYPECTOMY SNARE/COLD BIOPSY: Performed by: INTERNAL MEDICINE

## 2024-01-18 PROCEDURE — 3700000000 HC ANESTHESIA ATTENDED CARE: Performed by: INTERNAL MEDICINE

## 2024-01-18 RX ORDER — SODIUM CHLORIDE 9 MG/ML
25 INJECTION, SOLUTION INTRAVENOUS PRN
Status: DISCONTINUED | OUTPATIENT
Start: 2024-01-18 | End: 2024-01-18 | Stop reason: HOSPADM

## 2024-01-18 RX ORDER — LIDOCAINE HYDROCHLORIDE 20 MG/ML
INJECTION, SOLUTION EPIDURAL; INFILTRATION; INTRACAUDAL; PERINEURAL PRN
Status: DISCONTINUED | OUTPATIENT
Start: 2024-01-18 | End: 2024-01-18 | Stop reason: SDUPTHER

## 2024-01-18 RX ORDER — SODIUM CHLORIDE 0.9 % (FLUSH) 0.9 %
5-40 SYRINGE (ML) INJECTION EVERY 12 HOURS SCHEDULED
Status: DISCONTINUED | OUTPATIENT
Start: 2024-01-18 | End: 2024-01-18 | Stop reason: HOSPADM

## 2024-01-18 RX ORDER — SODIUM CHLORIDE, SODIUM LACTATE, POTASSIUM CHLORIDE, CALCIUM CHLORIDE 600; 310; 30; 20 MG/100ML; MG/100ML; MG/100ML; MG/100ML
INJECTION, SOLUTION INTRAVENOUS CONTINUOUS
Status: DISCONTINUED | OUTPATIENT
Start: 2024-01-18 | End: 2024-01-18 | Stop reason: HOSPADM

## 2024-01-18 RX ORDER — SODIUM CHLORIDE 0.9 % (FLUSH) 0.9 %
5-40 SYRINGE (ML) INJECTION PRN
Status: DISCONTINUED | OUTPATIENT
Start: 2024-01-18 | End: 2024-01-18 | Stop reason: HOSPADM

## 2024-01-18 RX ORDER — PROPOFOL 10 MG/ML
INJECTION, EMULSION INTRAVENOUS PRN
Status: DISCONTINUED | OUTPATIENT
Start: 2024-01-18 | End: 2024-01-18 | Stop reason: SDUPTHER

## 2024-01-18 RX ADMIN — SODIUM CHLORIDE, SODIUM LACTATE, POTASSIUM CHLORIDE, AND CALCIUM CHLORIDE: 600; 310; 30; 20 INJECTION, SOLUTION INTRAVENOUS at 06:30

## 2024-01-18 RX ADMIN — SODIUM CHLORIDE, SODIUM LACTATE, POTASSIUM CHLORIDE, AND CALCIUM CHLORIDE: 600; 310; 30; 20 INJECTION, SOLUTION INTRAVENOUS at 07:30

## 2024-01-18 RX ADMIN — PROPOFOL 140 MCG/KG/MIN: 10 INJECTION, EMULSION INTRAVENOUS at 07:36

## 2024-01-18 RX ADMIN — PROPOFOL 100 MG: 10 INJECTION, EMULSION INTRAVENOUS at 07:35

## 2024-01-18 RX ADMIN — SODIUM CHLORIDE, SODIUM LACTATE, POTASSIUM CHLORIDE, AND CALCIUM CHLORIDE: 600; 310; 30; 20 INJECTION, SOLUTION INTRAVENOUS at 06:23

## 2024-01-18 RX ADMIN — LIDOCAINE HYDROCHLORIDE 50 MG: 20 INJECTION, SOLUTION EPIDURAL; INFILTRATION; INTRACAUDAL; PERINEURAL at 07:35

## 2024-01-18 NOTE — DISCHARGE INSTRUCTIONS
Gastrointestinal Colonoscopy/Flexible Sigmoidoscopy - Lower Exam Discharge Instructions  Call Dr. Galeana at [phone] for any problems or questions.  Contact the doctor’s office for follow up appointment as directed  Medication may cause drowsiness for several hours, therefore:  Do not drive or operate machinery for reminder of the day.  No alcohol today.  Do not make any important or legal decisions for 24 hours.  Do not sign any legal documents for 24 hours.  Ordinarily, you may resume regular diet and activity after exam unless otherwise specified by your physician.  Because of air put into your colon during exam, you may experience some abdominal distension, relieved by the passage of gas, for several hours.  Contact your physician if you have any of the following:  Excessive amount of bleeding - large amount when having a bowel movement.  Occasional specks of blood with bowel movement would not be unusual.  Severe abdominal pain  Fever or Chills  Polyp Removal - follow these additional instructions  Take Metamucil - 1 tablespoon in juice every morning for 3 days  No Aspirin, Advil, Aleve, Nuprin, Ibuprofen, or medications that contain these drugs for 2 weeks.  Any additional instructions:     Repeat colonoscopy in 5 years

## 2024-01-18 NOTE — ANESTHESIA PRE PROCEDURE
Department of Anesthesiology  Preprocedure Note       Name:  Hudson Jacobsen   Age:  58 y.o.  :  1965                                          MRN:  [id number]         Date:  2024      Surgeon: Surgeon(s):  Halima Galeana MD    Procedure: Procedure(s):  COLORECTAL CANCER SCREENING, NOT HIGH RISK    Medications prior to admission:   Prior to Admission medications    Medication Sig Start Date End Date Taking? Authorizing Provider   pravastatin (PRAVACHOL) 40 MG tablet Take 1 tablet by mouth daily  Patient not taking: Reported on 2024   Khoa Rice APRN - NP   lisinopril (ZESTRIL) 2.5 MG tablet Take 1 tablet by mouth daily  Patient not taking: Reported on 2024   Khoa Rice APRN - NP   Lancets MISC E11.65. CHECK GLUCOSE AC AND QHS. 4/10/22   Automatic Reconciliation, Ar       Current medications:    No current facility-administered medications for this encounter.     Current Outpatient Medications   Medication Sig Dispense Refill   • pravastatin (PRAVACHOL) 40 MG tablet Take 1 tablet by mouth daily (Patient not taking: Reported on 2024) 90 tablet 1   • lisinopril (ZESTRIL) 2.5 MG tablet Take 1 tablet by mouth daily (Patient not taking: Reported on 2024) 90 tablet 1   • Lancets MISC E11.65. CHECK GLUCOSE AC AND QHS.         Allergies:  No Known Allergies    Problem List:    Patient Active Problem List   Diagnosis Code   • Obesity (BMI 35.0-39.9 without comorbidity) E66.9   • New onset type 2 diabetes mellitus (HCC) E11.9   • HTN (hypertension) I10   • Umbilical hernia without obstruction and without gangrene K42.9   • Abnormal EKG R94.31   • Hyperlipidemia E78.5   • Ventral hernia with obstruction and without gangrene K43.6   • Diastasis of rectus abdominis M62.08   • Kidney stone N20.0   • Urine test positive for microalbuminuria R80.9   • Pure hypercholesterolemia E78.00   • Encounter for screening colonoscopy Z12.11       Past Medical History:

## 2024-01-18 NOTE — PROGRESS NOTES
VSS. Discharge instructions reviewed with patient and wife and copy of instructions sent home with patient.  Questions answered. Patient transferred out via wheelchair by endo staff. IV discontinued prior to discharge.

## 2024-01-18 NOTE — PROCEDURES
Operative Report    Patient: Hudson Jacobsen MRN: [id number]      YOB: 1965  Age: 58 y.o.  Sex: male            Indications: History of colon polyps prior poor prep[unfilled]     Preoperative Evaluation: The patient was evaluated prior to the procedure in the GI lab admission area, the patient ASA was recorded .  Consent was obtained from the patient with the risk of perforation bleeding and aspiration.    Anesthesia: MOISES-per anesthesia    Complications: None; patient tolerated the procedure well.    EBL -insignificant      Procedure: The patient was sedated in the left lateral decubitus position.  Scope was advanced from the rectum to the cecum.  Evaluation was performed to the cecum twice.  The scope was withdrawn to the rectum, retroflexed view was performed.  The rectal exam was normal.  Preparation was good [place] score of 3/3/3:9 .    Findings:   Exam to the cecum.  3 cecal polyps adjacent to each other all pedunculated 5 ,8 and 8 mm all hot snared.  Left-sided diverticulosis.  No other polyps noted throughout the exam      Postoperative Diagnosis: Cecal polyps.  Diverticulosis    26539 Colonoscopy, Flexible; with removal of tumor(s), polyp(s), or other lesion(s) by snare technique      Recommendations:   - Repeat colonoscopy in 5 years.      Signed By:  Halima Galeana MD     January 18, 2024

## 2024-01-18 NOTE — ANESTHESIA POSTPROCEDURE EVALUATION
Department of Anesthesiology  Postprocedure Note    Patient: Hudson Jacobsen  MRN: [id number]  YOB: 1965  Date of evaluation: 1/18/2024    Procedure Summary       Date: 01/18/24 Room / Location: [facility] ENDO  / [facility] ENDOSCOPY    Anesthesia Start: 0730 Anesthesia Stop: 0751    Procedure: COLONOSCOPY POLYPECTOMY SNARE/HOT (Lower GI Region) Diagnosis:       Encounter for screening colonoscopy      (Encounter for screening colonoscopy [Z12.11])    Surgeons: Halima Galeana MD Responsible Provider: Sang Monterroso MD    Anesthesia Type: TIVA ASA Status: 2            Anesthesia Type: No value filed.    Zaki Phase I: Zaki Score: 10    Zaki Phase II: Zaki Score: 10    Anesthesia Post Evaluation    Patient location during evaluation: PACU  Patient participation: complete - patient participated  Level of consciousness: awake  Airway patency: patent  Nausea & Vomiting: no nausea  Cardiovascular status: blood pressure returned to baseline and hemodynamically stable  Respiratory status: acceptable  Hydration status: stable  Multimodal analgesia pain management approach  Pain management: adequate    No notable events documented.

## 2024-02-02 ENCOUNTER — TELEPHONE (OUTPATIENT)
Dept: GASTROENTEROLOGY | Age: 59
End: 2024-02-02

## 2024-02-02 NOTE — TELEPHONE ENCOUNTER
Called patient with colonoscopy results:    Date Obtained:   1/18/2024   DIAGNOSIS       \"CECAL POLYPS\":  FRAGMENTS OF MIXED TUBULOVILLOUS ADENOMA.     Procedure: The patient was sedated in the left lateral decubitus position.  Scope was advanced from the rectum to the cecum.  Evaluation was performed to the cecum twice.  The scope was withdrawn to the rectum, retroflexed view was performed.  The rectal exam was normal.  Preparation was good [place] score of 3/3/3:9 .     Findings:   Exam to the cecum.  3 cecal polyps adjacent to each other all pedunculated 5 ,8 and 8 mm all hot snared.  Left-sided diverticulosis.  No other polyps noted throughout the exam        Postoperative Diagnosis: Cecal polyps.  Diverticulosis     01691 Colonoscopy, Flexible; with removal of tumor(s), polyp(s), or other lesion(s) by snare technique        Recommendations:   - Repeat colonoscopy in 3 years.    Results/ recommendations to repeat colonoscopy in 3 years reviewed with patient. He verbalized understanding.

## 2024-02-15 PROBLEM — Z12.11 ENCOUNTER FOR SCREENING COLONOSCOPY: Status: RESOLVED | Noted: 2023-12-15 | Resolved: 2024-02-15

## 2024-03-11 ENCOUNTER — NURSE ONLY (OUTPATIENT)
Dept: FAMILY MEDICINE CLINIC | Facility: CLINIC | Age: 59
End: 2024-03-11
Payer: COMMERCIAL

## 2024-03-11 DIAGNOSIS — I10 PRIMARY HYPERTENSION: ICD-10-CM

## 2024-03-11 DIAGNOSIS — N20.0 KIDNEY STONE: ICD-10-CM

## 2024-03-11 DIAGNOSIS — E11.9 TYPE 2 DIABETES MELLITUS WITHOUT COMPLICATION, WITHOUT LONG-TERM CURRENT USE OF INSULIN (HCC): ICD-10-CM

## 2024-03-11 DIAGNOSIS — R80.9 URINE TEST POSITIVE FOR MICROALBUMINURIA: ICD-10-CM

## 2024-03-11 DIAGNOSIS — K42.9 UMBILICAL HERNIA WITHOUT OBSTRUCTION AND WITHOUT GANGRENE: ICD-10-CM

## 2024-03-11 DIAGNOSIS — I48.92 ATRIAL FLUTTER, UNSPECIFIED TYPE (HCC): ICD-10-CM

## 2024-03-11 DIAGNOSIS — E78.00 PURE HYPERCHOLESTEROLEMIA: ICD-10-CM

## 2024-03-11 LAB
ALBUMIN SERPL-MCNC: 3.7 G/DL (ref 3.5–5)
ALBUMIN/GLOB SERPL: 1 (ref 0.4–1.6)
ALP SERPL-CCNC: 66 U/L (ref 50–136)
ALT SERPL-CCNC: 23 U/L (ref 12–65)
ANION GAP SERPL CALC-SCNC: 5 MMOL/L (ref 2–11)
AST SERPL-CCNC: 18 U/L (ref 15–37)
BASOPHILS # BLD: 0 K/UL (ref 0–0.2)
BASOPHILS NFR BLD: 0 % (ref 0–2)
BILIRUB SERPL-MCNC: 0.3 MG/DL (ref 0.2–1.1)
BUN SERPL-MCNC: 8 MG/DL (ref 6–23)
CALCIUM SERPL-MCNC: 9.8 MG/DL (ref 8.3–10.4)
CHLORIDE SERPL-SCNC: 110 MMOL/L (ref 103–113)
CHOLEST SERPL-MCNC: 184 MG/DL
CO2 SERPL-SCNC: 28 MMOL/L (ref 21–32)
CREAT SERPL-MCNC: 1.1 MG/DL (ref 0.8–1.5)
DIFFERENTIAL METHOD BLD: ABNORMAL
EOSINOPHIL # BLD: 0.1 K/UL (ref 0–0.8)
EOSINOPHIL NFR BLD: 1 % (ref 0.5–7.8)
ERYTHROCYTE [DISTWIDTH] IN BLOOD BY AUTOMATED COUNT: 15.1 % (ref 11.9–14.6)
EST. AVERAGE GLUCOSE BLD GHB EST-MCNC: 137 MG/DL
GLOBULIN SER CALC-MCNC: 3.8 G/DL (ref 2.8–4.5)
GLUCOSE SERPL-MCNC: 118 MG/DL (ref 65–100)
HBA1C MFR BLD: 6.4 % (ref 4.8–5.6)
HCT VFR BLD AUTO: 45.2 % (ref 41.1–50.3)
HDLC SERPL-MCNC: 55 MG/DL (ref 40–60)
HDLC SERPL: 3.3
HGB BLD-MCNC: 14.1 G/DL (ref 13.6–17.2)
IMM GRANULOCYTES # BLD AUTO: 0.1 K/UL (ref 0–0.5)
IMM GRANULOCYTES NFR BLD AUTO: 1 % (ref 0–5)
LDLC SERPL CALC-MCNC: 117.2 MG/DL
LYMPHOCYTES # BLD: 4.2 K/UL (ref 0.5–4.6)
LYMPHOCYTES NFR BLD: 52 % (ref 13–44)
MCH RBC QN AUTO: 26.1 PG (ref 26.1–32.9)
MCHC RBC AUTO-ENTMCNC: 31.2 G/DL (ref 31.4–35)
MCV RBC AUTO: 83.7 FL (ref 82–102)
MONOCYTES # BLD: 0.5 K/UL (ref 0.1–1.3)
MONOCYTES NFR BLD: 7 % (ref 4–12)
NEUTS SEG # BLD: 3.2 K/UL (ref 1.7–8.2)
NEUTS SEG NFR BLD: 39 % (ref 43–78)
NRBC # BLD: 0 K/UL (ref 0–0.2)
PLATELET # BLD AUTO: 246 K/UL (ref 150–450)
PMV BLD AUTO: 10.9 FL (ref 9.4–12.3)
POTASSIUM SERPL-SCNC: 4 MMOL/L (ref 3.5–5.1)
PROT SERPL-MCNC: 7.5 G/DL (ref 6.3–8.2)
RBC # BLD AUTO: 5.4 M/UL (ref 4.23–5.6)
SODIUM SERPL-SCNC: 143 MMOL/L (ref 136–146)
TRIGL SERPL-MCNC: 59 MG/DL (ref 35–150)
TSH W FREE THYROID IF ABNORMAL: 1.01 UIU/ML (ref 0.36–3.74)
VLDLC SERPL CALC-MCNC: 11.8 MG/DL (ref 6–23)
WBC # BLD AUTO: 8 K/UL (ref 4.3–11.1)

## 2024-03-11 PROCEDURE — 36415 COLL VENOUS BLD VENIPUNCTURE: CPT | Performed by: NURSE PRACTITIONER

## 2024-03-22 ENCOUNTER — OFFICE VISIT (OUTPATIENT)
Dept: FAMILY MEDICINE CLINIC | Facility: CLINIC | Age: 59
End: 2024-03-22
Payer: COMMERCIAL

## 2024-03-22 VITALS
WEIGHT: 273 LBS | DIASTOLIC BLOOD PRESSURE: 78 MMHG | HEIGHT: 73 IN | RESPIRATION RATE: 16 BRPM | BODY MASS INDEX: 36.18 KG/M2 | SYSTOLIC BLOOD PRESSURE: 118 MMHG | HEART RATE: 82 BPM | OXYGEN SATURATION: 98 %

## 2024-03-22 DIAGNOSIS — K42.9 UMBILICAL HERNIA WITHOUT OBSTRUCTION AND WITHOUT GANGRENE: ICD-10-CM

## 2024-03-22 DIAGNOSIS — I10 PRIMARY HYPERTENSION: ICD-10-CM

## 2024-03-22 DIAGNOSIS — E78.00 PURE HYPERCHOLESTEROLEMIA: ICD-10-CM

## 2024-03-22 DIAGNOSIS — Z23 ENCOUNTER FOR IMMUNIZATION: ICD-10-CM

## 2024-03-22 DIAGNOSIS — N20.0 KIDNEY STONE: ICD-10-CM

## 2024-03-22 DIAGNOSIS — E66.9 OBESITY (BMI 30-39.9): ICD-10-CM

## 2024-03-22 DIAGNOSIS — Z12.11 SCREENING FOR COLON CANCER: ICD-10-CM

## 2024-03-22 DIAGNOSIS — Z12.5 SPECIAL SCREENING FOR MALIGNANT NEOPLASM OF PROSTATE: ICD-10-CM

## 2024-03-22 DIAGNOSIS — E11.9 TYPE 2 DIABETES MELLITUS WITHOUT COMPLICATION, WITHOUT LONG-TERM CURRENT USE OF INSULIN (HCC): Primary | ICD-10-CM

## 2024-03-22 DIAGNOSIS — R94.31 ABNORMAL EKG: ICD-10-CM

## 2024-03-22 DIAGNOSIS — R80.9 URINE TEST POSITIVE FOR MICROALBUMINURIA: ICD-10-CM

## 2024-03-22 PROCEDURE — 3074F SYST BP LT 130 MM HG: CPT | Performed by: NURSE PRACTITIONER

## 2024-03-22 PROCEDURE — 3078F DIAST BP <80 MM HG: CPT | Performed by: NURSE PRACTITIONER

## 2024-03-22 PROCEDURE — 99214 OFFICE O/P EST MOD 30 MIN: CPT | Performed by: NURSE PRACTITIONER

## 2024-03-22 PROCEDURE — 3044F HG A1C LEVEL LT 7.0%: CPT | Performed by: NURSE PRACTITIONER

## 2024-03-22 RX ORDER — PRAVASTATIN SODIUM 40 MG
40 TABLET ORAL DAILY
Qty: 90 TABLET | Refills: 1 | Status: SHIPPED | OUTPATIENT
Start: 2024-03-22

## 2024-03-22 RX ORDER — LISINOPRIL 2.5 MG/1
2.5 TABLET ORAL DAILY
Qty: 90 TABLET | Refills: 1 | Status: SHIPPED | OUTPATIENT
Start: 2024-03-22

## 2024-03-22 ASSESSMENT — PATIENT HEALTH QUESTIONNAIRE - PHQ9
SUM OF ALL RESPONSES TO PHQ QUESTIONS 1-9: 0
1. LITTLE INTEREST OR PLEASURE IN DOING THINGS: NOT AT ALL
SUM OF ALL RESPONSES TO PHQ9 QUESTIONS 1 & 2: 0
SUM OF ALL RESPONSES TO PHQ QUESTIONS 1-9: 0
2. FEELING DOWN, DEPRESSED OR HOPELESS: NOT AT ALL
SUM OF ALL RESPONSES TO PHQ QUESTIONS 1-9: 0
SUM OF ALL RESPONSES TO PHQ QUESTIONS 1-9: 0

## 2024-03-22 NOTE — PROGRESS NOTES
as laid out by [facility] Cardiology. Pt to f/u with me in 6 months. Will monitor.     9. Screening for colon cancer  Pt UTD on screening colonoscopy 24-GI Dr. Galeana. Due for repeat at 3 year point-2027.     10. Encounter for immunization  Pt UTD Tdap vaccine. Pt declined Covid, Shingrix, Pneumonia, and Flu vaccines today. Will continue to encourage pt to get UTD on these at future appointments.     11. Special screening for malignant neoplasm of prostate  PSA WNL 0.8 23. Will recheck PSA prior to f/u with me in 6 months. Will discuss results with pt at next appointment.   - PSA Screening; Future        Return in about 6 months (around 2024) for Routine physical/recheck chronic conditions (fasting labs prior). Sooner for concerns. .        SUBJECTIVE/OBJECTIVE:    HPI-  Hudson Jacobsen (: 1965) is a 59 y.o. male, Established patient patient, here for evaluation of the following chief complaint(s):  Follow-up (Pt here today for f/u to recheck chronic conditions. Please see ROS/Assessment and Plan section for full details of all items addressed during today's appointment. )     No Known Allergies  [unfilled]  Past Medical History:   Diagnosis Date   • Diabetes (HCC) 2022 new onset- no medication, -110, denies hypoglycemia, A1c 6.4 on 23   • Essential hypertension     prescribed lisinopril, does not take   • History of 2019 novel coronavirus disease (COVID-19) 2022    denies hospitalization   • Hyperlipidemia     prescribed pravastin, does not take   • Umbilical hernia      Past Surgical History:   Procedure Laterality Date   • COLONOSCOPY N/A 2022    COLONOSCOPY POLYPECTOMY SNARE performed by Halima Galeana MD at [facility] ENDOSCOPY   • COLONOSCOPY N/A 2024    COLONOSCOPY POLYPECTOMY SNARE/HOT performed by Halima Galeana MD at [facility] ENDOSCOPY   • ORTHOPEDIC SURGERY Right     pins in hand     Family History   Problem Relation Age of Onset   • Hypertension

## 2024-09-23 ENCOUNTER — LAB (OUTPATIENT)
Dept: FAMILY MEDICINE CLINIC | Facility: CLINIC | Age: 59
End: 2024-09-23

## 2024-09-23 DIAGNOSIS — E11.9 TYPE 2 DIABETES MELLITUS WITHOUT COMPLICATION, WITHOUT LONG-TERM CURRENT USE OF INSULIN (HCC): ICD-10-CM

## 2024-09-23 DIAGNOSIS — K42.9 UMBILICAL HERNIA WITHOUT OBSTRUCTION AND WITHOUT GANGRENE: ICD-10-CM

## 2024-09-23 DIAGNOSIS — R80.9 URINE TEST POSITIVE FOR MICROALBUMINURIA: ICD-10-CM

## 2024-09-23 DIAGNOSIS — I10 PRIMARY HYPERTENSION: ICD-10-CM

## 2024-09-23 DIAGNOSIS — E78.00 PURE HYPERCHOLESTEROLEMIA: ICD-10-CM

## 2024-09-23 DIAGNOSIS — N20.0 KIDNEY STONE: ICD-10-CM

## 2024-09-23 DIAGNOSIS — Z12.5 SPECIAL SCREENING FOR MALIGNANT NEOPLASM OF PROSTATE: ICD-10-CM

## 2024-09-23 LAB
ALBUMIN SERPL-MCNC: 4.1 G/DL (ref 3.5–5)
ALBUMIN/GLOB SERPL: 1.1 (ref 1–1.9)
ALP SERPL-CCNC: 67 U/L (ref 40–129)
ALT SERPL-CCNC: 27 U/L (ref 12–65)
ANION GAP SERPL CALC-SCNC: 10 MMOL/L (ref 9–18)
AST SERPL-CCNC: 23 U/L (ref 15–37)
BASOPHILS # BLD: 0 K/UL (ref 0–0.2)
BASOPHILS NFR BLD: 0 % (ref 0–2)
BILIRUB SERPL-MCNC: 0.3 MG/DL (ref 0–1.2)
BUN SERPL-MCNC: 13 MG/DL (ref 6–23)
CALCIUM SERPL-MCNC: 10.1 MG/DL (ref 8.8–10.2)
CHLORIDE SERPL-SCNC: 103 MMOL/L (ref 98–107)
CHOLEST SERPL-MCNC: 199 MG/DL (ref 0–200)
CO2 SERPL-SCNC: 29 MMOL/L (ref 20–28)
CREAT SERPL-MCNC: 1.08 MG/DL (ref 0.8–1.3)
CREAT UR-MCNC: 173 MG/DL (ref 39–259)
DIFFERENTIAL METHOD BLD: ABNORMAL
EOSINOPHIL # BLD: 0.1 K/UL (ref 0–0.8)
EOSINOPHIL NFR BLD: 1 % (ref 0.5–7.8)
ERYTHROCYTE [DISTWIDTH] IN BLOOD BY AUTOMATED COUNT: 14.5 % (ref 11.9–14.6)
EST. AVERAGE GLUCOSE BLD GHB EST-MCNC: 148 MG/DL
GLOBULIN SER CALC-MCNC: 3.8 G/DL (ref 2.3–3.5)
GLUCOSE SERPL-MCNC: 121 MG/DL (ref 70–99)
HBA1C MFR BLD: 6.8 % (ref 0–5.6)
HCT VFR BLD AUTO: 48.2 % (ref 41.1–50.3)
HDLC SERPL-MCNC: 50 MG/DL (ref 40–60)
HDLC SERPL: 4 (ref 0–5)
HGB BLD-MCNC: 14.9 G/DL (ref 13.6–17.2)
IMM GRANULOCYTES # BLD AUTO: 0 K/UL (ref 0–0.5)
IMM GRANULOCYTES NFR BLD AUTO: 0 % (ref 0–5)
LDLC SERPL CALC-MCNC: 136 MG/DL (ref 0–100)
LYMPHOCYTES # BLD: 4.1 K/UL (ref 0.5–4.6)
LYMPHOCYTES NFR BLD: 51 % (ref 13–44)
MCH RBC QN AUTO: 26.2 PG (ref 26.1–32.9)
MCHC RBC AUTO-ENTMCNC: 30.9 G/DL (ref 31.4–35)
MCV RBC AUTO: 84.9 FL (ref 82–102)
MICROALBUMIN UR-MCNC: <1.2 MG/DL (ref 0–20)
MICROALBUMIN/CREAT UR-RTO: NORMAL MG/G (ref 0–30)
MONOCYTES # BLD: 0.6 K/UL (ref 0.1–1.3)
MONOCYTES NFR BLD: 8 % (ref 4–12)
NEUTS SEG # BLD: 3.1 K/UL (ref 1.7–8.2)
NEUTS SEG NFR BLD: 39 % (ref 43–78)
NRBC # BLD: 0 K/UL (ref 0–0.2)
PLATELET # BLD AUTO: 266 K/UL (ref 150–450)
PMV BLD AUTO: 10.5 FL (ref 9.4–12.3)
POTASSIUM SERPL-SCNC: 4.5 MMOL/L (ref 3.5–5.1)
PROT SERPL-MCNC: 7.9 G/DL (ref 6.3–8.2)
PSA SERPL-MCNC: 0.6 NG/ML (ref 0–4)
RBC # BLD AUTO: 5.68 M/UL (ref 4.23–5.6)
SODIUM SERPL-SCNC: 143 MMOL/L (ref 136–145)
T4 FREE SERPL-MCNC: 1.4 NG/DL (ref 0.9–1.7)
TRIGL SERPL-MCNC: 65 MG/DL (ref 0–150)
TSH, 3RD GENERATION: 1.24 UIU/ML (ref 0.27–4.2)
VLDLC SERPL CALC-MCNC: 13 MG/DL (ref 6–23)
WBC # BLD AUTO: 7.9 K/UL (ref 4.3–11.1)

## 2024-09-30 ENCOUNTER — OFFICE VISIT (OUTPATIENT)
Dept: FAMILY MEDICINE CLINIC | Facility: CLINIC | Age: 59
End: 2024-09-30
Payer: COMMERCIAL

## 2024-09-30 VITALS
SYSTOLIC BLOOD PRESSURE: 122 MMHG | OXYGEN SATURATION: 98 % | RESPIRATION RATE: 16 BRPM | HEIGHT: 73 IN | DIASTOLIC BLOOD PRESSURE: 80 MMHG | BODY MASS INDEX: 36.84 KG/M2 | WEIGHT: 278 LBS | HEART RATE: 76 BPM

## 2024-09-30 DIAGNOSIS — Z23 ENCOUNTER FOR IMMUNIZATION: ICD-10-CM

## 2024-09-30 DIAGNOSIS — E78.00 PURE HYPERCHOLESTEROLEMIA: ICD-10-CM

## 2024-09-30 DIAGNOSIS — R80.9 URINE TEST POSITIVE FOR MICROALBUMINURIA: ICD-10-CM

## 2024-09-30 DIAGNOSIS — R94.31 ABNORMAL EKG: ICD-10-CM

## 2024-09-30 DIAGNOSIS — E66.9 OBESITY (BMI 30-39.9): ICD-10-CM

## 2024-09-30 DIAGNOSIS — N20.0 KIDNEY STONE: ICD-10-CM

## 2024-09-30 DIAGNOSIS — E11.9 TYPE 2 DIABETES MELLITUS WITHOUT COMPLICATION, WITHOUT LONG-TERM CURRENT USE OF INSULIN (HCC): Primary | ICD-10-CM

## 2024-09-30 DIAGNOSIS — I10 PRIMARY HYPERTENSION: ICD-10-CM

## 2024-09-30 DIAGNOSIS — Z12.11 SCREENING FOR COLON CANCER: ICD-10-CM

## 2024-09-30 DIAGNOSIS — K42.9 UMBILICAL HERNIA WITHOUT OBSTRUCTION AND WITHOUT GANGRENE: ICD-10-CM

## 2024-09-30 PROCEDURE — 3074F SYST BP LT 130 MM HG: CPT | Performed by: NURSE PRACTITIONER

## 2024-09-30 PROCEDURE — 99214 OFFICE O/P EST MOD 30 MIN: CPT | Performed by: NURSE PRACTITIONER

## 2024-09-30 PROCEDURE — 3079F DIAST BP 80-89 MM HG: CPT | Performed by: NURSE PRACTITIONER

## 2024-09-30 PROCEDURE — 3044F HG A1C LEVEL LT 7.0%: CPT | Performed by: NURSE PRACTITIONER

## 2024-09-30 RX ORDER — LISINOPRIL 2.5 MG/1
2.5 TABLET ORAL DAILY
Qty: 90 TABLET | Refills: 1 | Status: SHIPPED | OUTPATIENT
Start: 2024-09-30

## 2024-09-30 RX ORDER — PRAVASTATIN SODIUM 40 MG
40 TABLET ORAL DAILY
Qty: 90 TABLET | Refills: 1 | Status: SHIPPED | OUTPATIENT
Start: 2024-09-30

## 2024-09-30 SDOH — ECONOMIC STABILITY: FOOD INSECURITY: WITHIN THE PAST 12 MONTHS, THE FOOD YOU BOUGHT JUST DIDN'T LAST AND YOU DIDN'T HAVE MONEY TO GET MORE.: NEVER TRUE

## 2024-09-30 SDOH — ECONOMIC STABILITY: FOOD INSECURITY: WITHIN THE PAST 12 MONTHS, YOU WORRIED THAT YOUR FOOD WOULD RUN OUT BEFORE YOU GOT MONEY TO BUY MORE.: NEVER TRUE

## 2024-09-30 SDOH — ECONOMIC STABILITY: INCOME INSECURITY: HOW HARD IS IT FOR YOU TO PAY FOR THE VERY BASICS LIKE FOOD, HOUSING, MEDICAL CARE, AND HEATING?: NOT HARD AT ALL

## 2024-09-30 ASSESSMENT — ENCOUNTER SYMPTOMS
DIARRHEA: 0
SINUS PAIN: 0
VOMITING: 0
ALLERGIC/IMMUNOLOGIC NEGATIVE: 1
CONSTIPATION: 0
VOICE CHANGE: 0
WHEEZING: 0
ABDOMINAL DISTENTION: 0
RHINORRHEA: 0
FACIAL SWELLING: 0
COUGH: 0
STRIDOR: 0
BACK PAIN: 0
EYE DISCHARGE: 0
RESPIRATORY NEGATIVE: 1
COLOR CHANGE: 0
EYE PAIN: 0
EYES NEGATIVE: 1
RECTAL PAIN: 0
CHOKING: 0
NAUSEA: 0
SHORTNESS OF BREATH: 0
APNEA: 0
TROUBLE SWALLOWING: 0
SORE THROAT: 0
BLOOD IN STOOL: 0
SINUS PRESSURE: 0
CHEST TIGHTNESS: 0
ABDOMINAL PAIN: 0
ANAL BLEEDING: 0

## 2024-09-30 NOTE — PROGRESS NOT[place] [address]  Phone: [phone] Fax [phone]  Khoa Rice MS, APRN, FNP-C  9/30/2024  Chief Complaint   Patient presents with    Follow-up     Pt here today for f/u to recheck chronic conditions. Please see ROS/Assessment and Plan section for full details of all items addressed during today's appointment.         ASSESSMENT/PLAN:  Below is the assessment and plan developed based on review of pertinent history, physical exam, labs, studies, and medications.    1. Type 2 diabetes mellitus without complication, without long-term current use of insulin (HCC)  Pt has DM2. Pt is no longer on diabetes medication. Pt reports following diabetic/heart healthy/DASH diet and exercising as directed. Pt reports fasting FSBS in low 100's. On 9/23/24, pt's A1C worsened to 6.8% but still at goal of <7.0%. Discussed with pt. Will have pt continue diabetic/heart healthy/DASH diet and excercise and I will continue to leave him off of diabetes medication for time being. Will recheck fasting CMP/A1C prior to f/u with me in 6 months. If A1C goes up any more though, will consider adding Metformin  mg po daily. Diabetic foot exam was WNL today. Pt reports that he saw [facility] for diabetic eye exam this summer-records requested so I can update HCM. Will monitor.    - Comprehensive Metabolic Panel; Future  - Hemoglobin A1C; Future    2. Pure hypercholesterolemia  Pt has HLD. Pt reports following diabetic/heart healthy/DASH diet and exercising as directed, but reports stopping his Pravastatin 40 mg po daily several months ago. On 9/23/24, pt's trigs were WNL 65. HDL was WNL 50. LDL-C was elevated above goal of <70 at 136.  Discussed with pt. Will have pt continue diabetic/heart healthy/DASH diet and excercise and restart Pravastatin 40 mg po daily. Will recheck fasting lipids prior to f/u with me in 6 months. Will monitor.   - Lipid Panel; Future  -

## 2025-04-25 ENCOUNTER — LAB (OUTPATIENT)
Dept: FAMILY MEDICINE CLINIC | Facility: CLINIC | Age: 60
End: 2025-04-25

## 2025-04-25 DIAGNOSIS — E78.00 PURE HYPERCHOLESTEROLEMIA: ICD-10-CM

## 2025-04-25 DIAGNOSIS — N20.0 KIDNEY STONE: ICD-10-CM

## 2025-04-25 DIAGNOSIS — K42.9 UMBILICAL HERNIA WITHOUT OBSTRUCTION AND WITHOUT GANGRENE: ICD-10-CM

## 2025-04-25 DIAGNOSIS — E11.9 TYPE 2 DIABETES MELLITUS WITHOUT COMPLICATION, WITHOUT LONG-TERM CURRENT USE OF INSULIN (HCC): ICD-10-CM

## 2025-04-25 DIAGNOSIS — R80.9 URINE TEST POSITIVE FOR MICROALBUMINURIA: ICD-10-CM

## 2025-04-25 DIAGNOSIS — I10 PRIMARY HYPERTENSION: ICD-10-CM

## 2025-04-25 DIAGNOSIS — R94.31 ABNORMAL EKG: ICD-10-CM

## 2025-04-25 LAB
ALBUMIN SERPL-MCNC: 3.7 G/DL (ref 3.5–5)
ALBUMIN/GLOB SERPL: 0.9 (ref 1–1.9)
ALP SERPL-CCNC: 64 U/L (ref 40–129)
ALT SERPL-CCNC: 35 U/L (ref 8–55)
ANION GAP SERPL CALC-SCNC: 11 MMOL/L (ref 7–16)
AST SERPL-CCNC: 30 U/L (ref 15–37)
BILIRUB SERPL-MCNC: 0.2 MG/DL (ref 0–1.2)
BUN SERPL-MCNC: 13 MG/DL (ref 6–23)
CALCIUM SERPL-MCNC: 9.4 MG/DL (ref 8.8–10.2)
CHLORIDE SERPL-SCNC: 104 MMOL/L (ref 98–107)
CHOLEST SERPL-MCNC: 196 MG/DL (ref 0–200)
CO2 SERPL-SCNC: 25 MMOL/L (ref 20–29)
CREAT SERPL-MCNC: 1.15 MG/DL (ref 0.8–1.3)
EST. AVERAGE GLUCOSE BLD GHB EST-MCNC: 155 MG/DL
GLOBULIN SER CALC-MCNC: 3.9 G/DL (ref 2.3–3.5)
GLUCOSE SERPL-MCNC: 130 MG/DL (ref 70–99)
HBA1C MFR BLD: 7 % (ref 0–5.6)
HDLC SERPL-MCNC: 49 MG/DL (ref 40–60)
HDLC SERPL: 4 (ref 0–5)
LDLC SERPL CALC-MCNC: 134 MG/DL (ref 0–100)
POTASSIUM SERPL-SCNC: 4.4 MMOL/L (ref 3.5–5.1)
PROT SERPL-MCNC: 7.6 G/DL (ref 6.3–8.2)
SODIUM SERPL-SCNC: 140 MMOL/L (ref 136–145)
TRIGL SERPL-MCNC: 67 MG/DL (ref 0–150)
VLDLC SERPL CALC-MCNC: 13 MG/DL (ref 6–23)

## 2025-04-28 ENCOUNTER — RESULTS FOLLOW-UP (OUTPATIENT)
Dept: FAMILY MEDICINE CLINIC | Facility: CLINIC | Age: 60
End: 2025-04-28

## 2025-05-09 ENCOUNTER — OFFICE VISIT (OUTPATIENT)
Dept: FAMILY MEDICINE CLINIC | Facility: CLINIC | Age: 60
End: 2025-05-09

## 2025-05-09 VITALS
HEIGHT: 73 IN | BODY MASS INDEX: 35.7 KG/M2 | OXYGEN SATURATION: 98 % | SYSTOLIC BLOOD PRESSURE: 116 MMHG | WEIGHT: 269.4 LBS | HEART RATE: 74 BPM | RESPIRATION RATE: 16 BRPM | DIASTOLIC BLOOD PRESSURE: 70 MMHG

## 2025-05-09 DIAGNOSIS — K42.9 UMBILICAL HERNIA WITHOUT OBSTRUCTION AND WITHOUT GANGRENE: ICD-10-CM

## 2025-05-09 DIAGNOSIS — E78.00 PURE HYPERCHOLESTEROLEMIA: ICD-10-CM

## 2025-05-09 DIAGNOSIS — E11.9 TYPE 2 DIABETES MELLITUS WITHOUT COMPLICATION, WITHOUT LONG-TERM CURRENT USE OF INSULIN (HCC): Primary | ICD-10-CM

## 2025-05-09 DIAGNOSIS — R94.31 ABNORMAL EKG: ICD-10-CM

## 2025-05-09 DIAGNOSIS — N20.0 KIDNEY STONE: ICD-10-CM

## 2025-05-09 DIAGNOSIS — Z23 ENCOUNTER FOR IMMUNIZATION: ICD-10-CM

## 2025-05-09 DIAGNOSIS — R80.9 URINE TEST POSITIVE FOR MICROALBUMINURIA: ICD-10-CM

## 2025-05-09 DIAGNOSIS — Z00.00 LABORATORY EXAM ORDERED AS PART OF ROUTINE GENERAL MEDICAL EXAMINATION: ICD-10-CM

## 2025-05-09 DIAGNOSIS — E66.9 OBESITY (BMI 30-39.9): ICD-10-CM

## 2025-05-09 DIAGNOSIS — Z12.11 SCREENING FOR COLON CANCER: ICD-10-CM

## 2025-05-09 DIAGNOSIS — Z12.5 SPECIAL SCREENING FOR MALIGNANT NEOPLASM OF PROSTATE: ICD-10-CM

## 2025-05-09 DIAGNOSIS — I10 PRIMARY HYPERTENSION: ICD-10-CM

## 2025-05-09 RX ORDER — METFORMIN HYDROCHLORIDE 500 MG/1
500 TABLET, EXTENDED RELEASE ORAL
Qty: 90 TABLET | Refills: 1 | Status: SHIPPED | OUTPATIENT
Start: 2025-05-09

## 2025-05-09 RX ORDER — LISINOPRIL 2.5 MG/1
2.5 TABLET ORAL DAILY
Qty: 90 TABLET | Refills: 1 | Status: SHIPPED | OUTPATIENT
Start: 2025-05-09

## 2025-05-09 RX ORDER — PRAVASTATIN SODIUM 40 MG
40 TABLET ORAL DAILY
Qty: 90 TABLET | Refills: 1 | Status: SHIPPED | OUTPATIENT
Start: 2025-05-09

## 2025-05-09 SDOH — ECONOMIC STABILITY: FOOD INSECURITY: WITHIN THE PAST 12 MONTHS, YOU WORRIED THAT YOUR FOOD WOULD RUN OUT BEFORE YOU GOT MONEY TO BUY MORE.: NEVER TRUE

## 2025-05-09 ASSESSMENT — ENCOUNTER SYMPTOMS
NAUSEA: 0
RECTAL PAIN: 0
VOMITING: 0
TROUBLE SWALLOWING: 0
BACK PAIN: 0
SHORTNESS OF BREATH: 0
STRIDOR: 0
ALLERGIC/IMMUNOLOGIC NEGATIVE: 1
COLOR CHANGE: 0
FACIAL SWELLING: 0
COUGH: 0
APNEA: 0
CONSTIPATION: 0
SORE THROAT: 0
CHEST TIGHTNESS: 0
VOICE CHANGE: 0
EYE DISCHARGE: 0
EYE PAIN: 0
ANAL BLEEDING: 0
EYES NEGATIVE: 1
CHOKING: 0
ABDOMINAL PAIN: 0
BLOOD IN STOOL: 0
RESPIRATORY NEGATIVE: 1
RHINORRHEA: 0
SINUS PRESSURE: 0
DIARRHEA: 0
SINUS PAIN: 0
WHEEZING: 0

## 2025-05-09 ASSESSMENT — PATIENT HEALTH QUESTIONNAIRE - PHQ9
SUM OF ALL RESPONSES TO PHQ QUESTIONS 1-9: 0
SUM OF ALL RESPONSES TO PHQ QUESTIONS 1-9: 0
1. LITTLE INTEREST OR PLEASURE IN DOING THINGS: NOT AT ALL
2. FEELING DOWN, DEPRESSED OR HOPELESS: NOT AT ALL
SUM OF ALL RESPONSES TO PHQ QUESTIONS 1-9: 0
SUM OF ALL RESPONSES TO PHQ QUESTIONS 1-9: 0

## 2025-05-09 NOTE — PROGRESS NOTES
Cervical back: Normal range of motion and neck supple. No rigidity or tenderness.      Right lower leg: No edema.      Left lower leg: No edema.      Comments: Gait steady and unassisted   Lymphadenopathy:      Cervical: No cervical adenopathy.   Skin:     General: Skin is warm.      Capillary Refill: Capillary refill takes less than 2 seconds.      Coloration: Skin is not jaundiced or pale.      Findings: No bruising, erythema, lesion or rash.   Neurological:      General: No focal deficit present.      Mental Status: He is alert and oriented to person, place, and time.      Cranial Nerves: No cranial nerve deficit.      Sensory: No sensory deficit.      Motor: No weakness.      Coordination: Coordination normal.      Gait: Gait normal.   Psychiatric:         Mood and Affect: Mood normal.         Behavior: Behavior normal.         Thought Content: Thought content normal.         Judgment: Judgment normal.     Component      Latest Ref Rng 9/23/2024 4/25/2025   WBC      4.3 - 11.1 K/uL 7.9     RBC      4.23 - 5.6 M/uL 5.68 (H)     Hemoglobin Quant      13.6 - 17.2 g/dL 14.9     Hematocrit      41.1 - 50.3 % 48.2     MCV      82 - 102 FL 84.9     MCH      26.1 - 32.9 PG 26.2     MCHC      31.4 - 35.0 g/dL 30.9 (L)     RDW      11.9 - 14.6 % 14.5     Platelet Count      150 - 450 K/uL 266     MPV      9.4 - 12.3 FL 10.5     Nucleated Red Blood Cells      0.0 - 0.2 K/uL 0.00     Differential Type        AUTOMATED     Neutrophils %      43 - 78 % 39 (L)     Lymphocyte %      13 - 44 % 51 (H)     Monocytes %      4.0 - 12.0 % 8     Eosinophils %      0.5 - 7.8 % 1     Basophils %      0.0 - 2.0 % 0     Immature Granulocytes %      0.0 - 5.0 % 0     Neutrophils Absolute      1.7 - 8.2 K/UL 3.1     Lymphocytes Absolute      0.5 - 4.6 K/UL 4.1     Monocytes Absolute      0.1 - 1.3 K/UL 0.6     Eosinophils Absolute      0.0 - 0.8 K/UL 0.1     Basophils Absolute      0.0 - 0.2 K/UL 0.0     Immature Granulocytes

## 2025-05-23 ENCOUNTER — OFFICE VISIT (OUTPATIENT)
Dept: FAMILY MEDICINE CLINIC | Facility: CLINIC | Age: 60
End: 2025-05-23
Payer: COMMERCIAL

## 2025-05-23 VITALS
SYSTOLIC BLOOD PRESSURE: 122 MMHG | HEIGHT: 73 IN | DIASTOLIC BLOOD PRESSURE: 80 MMHG | RESPIRATION RATE: 16 BRPM | OXYGEN SATURATION: 98 % | BODY MASS INDEX: 35.41 KG/M2 | WEIGHT: 267.2 LBS | HEART RATE: 68 BPM

## 2025-05-23 DIAGNOSIS — M79.661 PAIN IN RIGHT LOWER LEG: Primary | ICD-10-CM

## 2025-05-23 PROCEDURE — 99213 OFFICE O/P EST LOW 20 MIN: CPT | Performed by: NURSE PRACTITIONER

## 2025-05-23 PROCEDURE — 3079F DIAST BP 80-89 MM HG: CPT | Performed by: NURSE PRACTITIONER

## 2025-05-23 PROCEDURE — 3074F SYST BP LT 130 MM HG: CPT | Performed by: NURSE PRACTITIONER

## 2025-05-23 RX ORDER — DICLOFENAC SODIUM 75 MG/1
TABLET, DELAYED RELEASE ORAL
Qty: 30 TABLET | Refills: 2 | Status: SHIPPED | OUTPATIENT
Start: 2025-05-23

## 2025-05-23 ASSESSMENT — ENCOUNTER SYMPTOMS
WHEEZING: 0
DIARRHEA: 0
COLOR CHANGE: 0
CHEST TIGHTNESS: 0
BACK PAIN: 0
CHOKING: 0
APNEA: 0
FACIAL SWELLING: 0
SHORTNESS OF BREATH: 0
ABDOMINAL DISTENTION: 0
COUGH: 0
ABDOMINAL PAIN: 0
EYE DISCHARGE: 0
ANAL BLEEDING: 0
EYES NEGATIVE: 1
RESPIRATORY NEGATIVE: 1
VOICE CHANGE: 0
SINUS PRESSURE: 0
STRIDOR: 0
EYE PAIN: 0
TROUBLE SWALLOWING: 0
RECTAL PAIN: 0
CONSTIPATION: 0
SINUS PAIN: 0
ALLERGIC/IMMUNOLOGIC NEGATIVE: 1
SORE THROAT: 0
VOMITING: 0
RHINORRHEA: 0
NAUSEA: 0
BLOOD IN STOOL: 0

## 2025-05-23 NOTE — PROGRESS NOTES
[place] [address]  Phone: [phone] Fax [phone]  Khoa Rice MS, APRN, FNP-C  5/23/2025  Chief Complaint   Patient presents with    Leg Pain     See below      Pt who has a hx of DM2, HLD, HTN, obesity, urine microalbuminuria, umbilical hernia, kidney stone reports starting a few weeks ago with pain to right anterior leg in tibial region after starting doing squats at the gym. Pt reports pain worse with leg exercises. Pt able to bear weight and ambulate unassisted without limp despite pain. Pt denies any pain, edema, increased warmth to right calf and denies any other MSK complaints. Pt denies taking anything OTC for the pain.     ASSESSMENT/PLAN:  Below is the assessment and plan developed based on review of pertinent history, physical exam, labs, studies, and medications.    1. Pain in right lower leg  Pt who has a hx of DM2, HLD, HTN, obesity, urine microalbuminuria, umbilical hernia, kidney stone reports starting a few weeks ago with pain to right anterior leg in tibial region after starting doing squats at the gym. Pt reports pain worse with leg exercises. Pt able to bear weight and ambulate unassisted without limp despite pain. Pt denies any pain, edema, increased warmth to right calf and denies any other MSK complaints. Pt denies taking anything OTC for the pain. On physical exam today, pt noted to have some ttp to right anterior leg in tibial region. No obvious deformity noted. Pt able to bear weight and ambulate unassisted without limp despite pain. No tenderness, edema, increased warmth noted to right calf. Negative Homans sign. Rest of MSK WNL. Discussed with pt. Symptoms/physical exam findings c/w right leg strain vs shin splints. Pt has no symptoms/physical exam findings to suggest DVT at this time. Doubt this is related to pt's Pravastatin as I would expect more generalized MSK complaints. Will treat with Diclofenac 75 mg po bid

## (undated) DEVICE — SYRINGE, LUER SLIP, STERILE, 60ML: Brand: MEDLINE

## (undated) DEVICE — AIRLIFE™ OXYGEN TUBING 7 FEET (2.1 M) CRUSH RESISTANT OXYGEN TUBING, VINYL TIPPED: Brand: AIRLIFE™

## (undated) DEVICE — SYRINGE MED 3ML CLR PLAS STD N CTRL LUERLOCK TIP DISP

## (undated) DEVICE — NEEDLE SYR 18GA L1.5IN RED PLAS HUB S STL BLNT FILL W/O

## (undated) DEVICE — GAUZE,SPONGE,4"X4",12PLY,WOVEN,NS,LF: Brand: MEDLINE

## (undated) DEVICE — SNARE POLYP SM W13MMXL240CM SHTH DIA2.4MM OVL FLX DISP

## (undated) DEVICE — KENDALL RADIOLUCENT FOAM MONITORING ELECTRODE RECTANGULAR SHAPE: Brand: KENDALL

## (undated) DEVICE — CONTAINER FORMALIN PFILLED 10% NBF 40ML

## (undated) DEVICE — SYRINGE MEDICAL 3ML CLEAR PLASTIC STANDARD NON CONTROL LUERLOCK TIP DISPOSABLE

## (undated) DEVICE — TRAP SPEC POLYP REM STRNR CLN DSGN MAGNIFYING WIND DISP

## (undated) DEVICE — YANKAUER,BULB TIP,W/O VENT,RIGID,STERILE: Brand: MEDLINE

## (undated) DEVICE — SYRINGE MED 10ML LUERLOCK TIP W/O SFTY DISP

## (undated) DEVICE — SINGLE PORT MANIFOLD: Brand: NEPTUNE 2

## (undated) DEVICE — LUBE JELLY FOIL PACK 1.4 OZ: Brand: MEDLINE INDUSTRIES, INC.

## (undated) DEVICE — ENDOSCOPIC KIT 1.1+ OP4 CA DE 2 GWN AAMI LEVEL 3

## (undated) DEVICE — CANNULA NSL ORAL AD FOR CAPNOFLEX CO2 O2 AIRLFE

## (undated) DEVICE — CONTAINER FORMALIN PREFILLED 10% NBF 60ML

## (undated) DEVICE — ELECTRODE PT RET AD L9FT HI MOIST COND ADH HYDRGEL CORDED

## (undated) DEVICE — CONNECTOR TBNG OD5-7MM O2 END DISP